# Patient Record
Sex: MALE | Race: WHITE | NOT HISPANIC OR LATINO | Employment: OTHER | URBAN - METROPOLITAN AREA
[De-identification: names, ages, dates, MRNs, and addresses within clinical notes are randomized per-mention and may not be internally consistent; named-entity substitution may affect disease eponyms.]

---

## 2017-02-10 ENCOUNTER — HOSPITAL ENCOUNTER (OUTPATIENT)
Dept: RADIOLOGY | Facility: MEDICAL CENTER | Age: 77
End: 2017-02-10
Attending: INTERNAL MEDICINE
Payer: MEDICARE

## 2017-02-10 DIAGNOSIS — K50.019 CROHN'S DISEASE OF SMALL INTESTINE WITH COMPLICATION (HCC): ICD-10-CM

## 2017-02-10 PROCEDURE — 700117 HCHG RX CONTRAST REV CODE 255: Performed by: INTERNAL MEDICINE

## 2017-02-10 PROCEDURE — A9577 INJ MULTIHANCE: HCPCS | Performed by: INTERNAL MEDICINE

## 2017-02-10 PROCEDURE — 74183 MRI ABD W/O CNTR FLWD CNTR: CPT

## 2017-02-10 PROCEDURE — 700111 HCHG RX REV CODE 636 W/ 250 OVERRIDE (IP): Performed by: RADIOLOGY

## 2017-02-10 PROCEDURE — 72197 MRI PELVIS W/O & W/DYE: CPT

## 2017-02-10 RX ADMIN — GLUCAGON HYDROCHLORIDE 1 MG: KIT at 14:15

## 2017-02-10 RX ADMIN — GADOBENATE DIMEGLUMINE 10 ML: 529 INJECTION, SOLUTION INTRAVENOUS at 15:34

## 2017-03-07 ENCOUNTER — APPOINTMENT (OUTPATIENT)
Dept: ONCOLOGY | Facility: MEDICAL CENTER | Age: 77
End: 2017-03-07
Attending: INTERNAL MEDICINE
Payer: MEDICARE

## 2017-03-15 ENCOUNTER — OUTPATIENT INFUSION SERVICES (OUTPATIENT)
Dept: ONCOLOGY | Facility: MEDICAL CENTER | Age: 77
End: 2017-03-15
Attending: INTERNAL MEDICINE
Payer: MEDICARE

## 2017-03-15 VITALS
DIASTOLIC BLOOD PRESSURE: 77 MMHG | HEART RATE: 63 BPM | HEIGHT: 71 IN | OXYGEN SATURATION: 97 % | SYSTOLIC BLOOD PRESSURE: 157 MMHG | TEMPERATURE: 98.3 F | BODY MASS INDEX: 23.92 KG/M2 | WEIGHT: 170.86 LBS | RESPIRATION RATE: 18 BRPM

## 2017-03-15 DIAGNOSIS — K50.119 CROHN'S DISEASE OF COLON, UNSPECIFIED COMPLICATION (HCC): ICD-10-CM

## 2017-03-15 PROCEDURE — 86480 TB TEST CELL IMMUN MEASURE: CPT

## 2017-03-15 PROCEDURE — 36415 COLL VENOUS BLD VENIPUNCTURE: CPT

## 2017-03-15 RX ORDER — CYANOCOBALAMIN 1000 UG/ML
1000 INJECTION, SOLUTION INTRAMUSCULAR; SUBCUTANEOUS
COMMUNITY
End: 2019-01-04

## 2017-03-15 RX ORDER — CHLORTHALIDONE 25 MG/1
25 TABLET ORAL DAILY
COMMUNITY
End: 2019-01-04

## 2017-03-15 ASSESSMENT — PAIN SCALES - GENERAL: PAINLEVEL: 7=MODERATE-SEVERE PAIN

## 2017-03-15 NOTE — PROGRESS NOTES
Pt scheduled for [facility].  Upon review of requirements, pt needs CBC and TB test, was able to receive lab results from MD office/hospital but TB test is 5 years old,  Notified pt that we can draw lab today, but cannot infuse medication.  Pt agreeable to lab only today.  Lab drawn from [facility] with 23 G butterfly and sent to lab.  Pt states he will call to reschedule his apt.  Pt left IC, no s/s of distress.

## 2017-03-16 LAB
M TB TUBERC IFN-G BLD QL: NEGATIVE
M TB TUBERC IFN-G/MITOGEN IGNF BLD: -0
M TB TUBERC IGNF/MITOGEN IGNF CONTROL: 9.36 [IU]/ML
MITOGEN IGNF BCKGRD COR BLD-ACNC: 0.02 [IU]/ML

## 2017-03-28 ENCOUNTER — OUTPATIENT INFUSION SERVICES (OUTPATIENT)
Dept: ONCOLOGY | Facility: MEDICAL CENTER | Age: 77
End: 2017-03-28
Attending: INTERNAL MEDICINE
Payer: MEDICARE

## 2017-03-28 VITALS
BODY MASS INDEX: 24.17 KG/M2 | DIASTOLIC BLOOD PRESSURE: 71 MMHG | HEART RATE: 57 BPM | HEIGHT: 71 IN | RESPIRATION RATE: 18 BRPM | TEMPERATURE: 97.7 F | OXYGEN SATURATION: 100 % | WEIGHT: 172.62 LBS | SYSTOLIC BLOOD PRESSURE: 156 MMHG

## 2017-03-28 PROCEDURE — 96413 CHEMO IV INFUSION 1 HR: CPT

## 2017-03-28 PROCEDURE — 700111 HCHG RX REV CODE 636 W/ 250 OVERRIDE (IP): Performed by: INTERNAL MEDICINE

## 2017-03-28 PROCEDURE — C9399 UNCLASSIFIED DRUGS OR BIOLOG: HCPCS | Performed by: INTERNAL MEDICINE

## 2017-03-28 PROCEDURE — 700105 HCHG RX REV CODE 258: Performed by: INTERNAL MEDICINE

## 2017-03-28 PROCEDURE — 306780 HCHG STAT FOR TRANSFUSION PER CASE

## 2017-03-28 RX ADMIN — USTEKINUMAB: 130 SOLUTION INTRAVENOUS at 12:05

## 2017-03-28 ASSESSMENT — PAIN SCALES - GENERAL: PAINLEVEL: NO PAIN

## 2017-03-28 NOTE — PROGRESS NOTES
PT is here for his Stelara infusion to treat Chron's disease. Pt states he failed on Humira an this is the medication his MD is recommending. Labs/TB drawn previously and reviewed. Pt denies any s/s infection. Infusion completed over one hour as ordered (filter in place) and pt monitored about 30min after infusion. Treatment well tolerated. Pt has plans to continue with injections at home. Discharged home to self care.

## 2018-05-24 ENCOUNTER — HOSPITAL ENCOUNTER (OUTPATIENT)
Dept: LAB | Facility: MEDICAL CENTER | Age: 78
End: 2018-05-24
Attending: INTERNAL MEDICINE
Payer: MEDICARE

## 2018-05-24 ENCOUNTER — HOSPITAL ENCOUNTER (OUTPATIENT)
Dept: RADIOLOGY | Facility: MEDICAL CENTER | Age: 78
End: 2018-05-24
Attending: INTERNAL MEDICINE
Payer: MEDICARE

## 2018-05-24 DIAGNOSIS — R93.5 ABNORMAL CT OF THE ABDOMEN: ICD-10-CM

## 2018-05-24 LAB
BUN SERPL-MCNC: 34 MG/DL (ref 8–22)
CREAT SERPL-MCNC: 1.82 MG/DL (ref 0.5–1.4)

## 2018-05-24 PROCEDURE — 82565 ASSAY OF CREATININE: CPT

## 2018-05-24 PROCEDURE — 74183 MRI ABD W/O CNTR FLWD CNTR: CPT

## 2018-05-24 PROCEDURE — 36415 COLL VENOUS BLD VENIPUNCTURE: CPT

## 2018-05-24 PROCEDURE — A9577 INJ MULTIHANCE: HCPCS | Performed by: INTERNAL MEDICINE

## 2018-05-24 PROCEDURE — 84520 ASSAY OF UREA NITROGEN: CPT

## 2018-05-24 PROCEDURE — 700117 HCHG RX CONTRAST REV CODE 255: Performed by: INTERNAL MEDICINE

## 2018-05-24 RX ADMIN — GADOBENATE DIMEGLUMINE 10 ML: 529 INJECTION, SOLUTION INTRAVENOUS at 18:17

## 2019-01-04 ENCOUNTER — APPOINTMENT (OUTPATIENT)
Dept: ADMISSIONS | Facility: MEDICAL CENTER | Age: 79
End: 2019-01-04
Attending: INTERNAL MEDICINE
Payer: MEDICARE

## 2019-01-04 RX ORDER — CARVEDILOL 6.25 MG/1
6.25 TABLET ORAL 2 TIMES DAILY WITH MEALS
COMMUNITY
End: 2023-01-03

## 2019-01-07 ENCOUNTER — APPOINTMENT (OUTPATIENT)
Dept: RADIOLOGY | Facility: MEDICAL CENTER | Age: 79
End: 2019-01-07
Attending: INTERNAL MEDICINE
Payer: MEDICARE

## 2019-01-07 ENCOUNTER — HOSPITAL ENCOUNTER (OUTPATIENT)
Facility: MEDICAL CENTER | Age: 79
End: 2019-01-07
Attending: INTERNAL MEDICINE | Admitting: INTERNAL MEDICINE
Payer: MEDICARE

## 2019-01-07 VITALS
OXYGEN SATURATION: 97 % | TEMPERATURE: 97.6 F | RESPIRATION RATE: 16 BRPM | BODY MASS INDEX: 24.35 KG/M2 | WEIGHT: 173.94 LBS | SYSTOLIC BLOOD PRESSURE: 168 MMHG | HEIGHT: 71 IN | DIASTOLIC BLOOD PRESSURE: 97 MMHG | HEART RATE: 58 BPM

## 2019-01-07 DIAGNOSIS — N18.30 CHRONIC KIDNEY DISEASE, STAGE III (MODERATE) (HCC): ICD-10-CM

## 2019-01-07 LAB
ERYTHROCYTE [DISTWIDTH] IN BLOOD BY AUTOMATED COUNT: 46 FL (ref 35.9–50)
HCT VFR BLD AUTO: 33.9 % (ref 42–52)
HGB BLD-MCNC: 11.1 G/DL (ref 14–18)
INR PPP: 1.1 (ref 0.87–1.13)
MCH RBC QN AUTO: 32.4 PG (ref 27–33)
MCHC RBC AUTO-ENTMCNC: 32.7 G/DL (ref 33.7–35.3)
MCV RBC AUTO: 98.8 FL (ref 81.4–97.8)
PATHOLOGY CONSULT NOTE: NORMAL
PLATELET # BLD AUTO: 225 K/UL (ref 164–446)
PMV BLD AUTO: 10.4 FL (ref 9–12.9)
PROTHROMBIN TIME: 14.3 SEC (ref 12–14.6)
RBC # BLD AUTO: 3.43 M/UL (ref 4.7–6.1)
WBC # BLD AUTO: 6.5 K/UL (ref 4.8–10.8)

## 2019-01-07 PROCEDURE — 700105 HCHG RX REV CODE 258: Performed by: RADIOLOGY

## 2019-01-07 PROCEDURE — 700111 HCHG RX REV CODE 636 W/ 250 OVERRIDE (IP)

## 2019-01-07 PROCEDURE — 88305 TISSUE EXAM BY PATHOLOGIST: CPT

## 2019-01-07 PROCEDURE — 160002 HCHG RECOVERY MINUTES (STAT)

## 2019-01-07 PROCEDURE — 88346 IMFLUOR 1ST 1ANTB STAIN PX: CPT

## 2019-01-07 PROCEDURE — 88348 ELECTRON MICROSCOPY DX: CPT

## 2019-01-07 PROCEDURE — 88313 SPECIAL STAINS GROUP 2: CPT | Mod: 91

## 2019-01-07 PROCEDURE — 88300 SURGICAL PATH GROSS: CPT

## 2019-01-07 PROCEDURE — 76942 ECHO GUIDE FOR BIOPSY: CPT

## 2019-01-07 PROCEDURE — 85027 COMPLETE CBC AUTOMATED: CPT

## 2019-01-07 PROCEDURE — 85610 PROTHROMBIN TIME: CPT

## 2019-01-07 PROCEDURE — 700111 HCHG RX REV CODE 636 W/ 250 OVERRIDE (IP): Performed by: RADIOLOGY

## 2019-01-07 RX ORDER — ONDANSETRON 2 MG/ML
4 INJECTION INTRAMUSCULAR; INTRAVENOUS PRN
Status: DISCONTINUED | OUTPATIENT
Start: 2019-01-07 | End: 2019-01-07 | Stop reason: HOSPADM

## 2019-01-07 RX ORDER — SODIUM CHLORIDE 9 MG/ML
1000 INJECTION, SOLUTION INTRAVENOUS
Status: DISCONTINUED | OUTPATIENT
Start: 2019-01-07 | End: 2019-01-07 | Stop reason: HOSPADM

## 2019-01-07 RX ORDER — OXYCODONE HYDROCHLORIDE 5 MG/1
2.5 TABLET ORAL
Status: DISCONTINUED | OUTPATIENT
Start: 2019-01-07 | End: 2019-01-07 | Stop reason: HOSPADM

## 2019-01-07 RX ORDER — HYDRALAZINE HYDROCHLORIDE 20 MG/ML
INJECTION INTRAMUSCULAR; INTRAVENOUS
Status: DISCONTINUED
Start: 2019-01-07 | End: 2019-01-07 | Stop reason: HOSPADM

## 2019-01-07 RX ORDER — NALOXONE HYDROCHLORIDE 0.4 MG/ML
INJECTION, SOLUTION INTRAMUSCULAR; INTRAVENOUS; SUBCUTANEOUS
Status: COMPLETED
Start: 2019-01-07 | End: 2019-01-07

## 2019-01-07 RX ORDER — SODIUM CHLORIDE 9 MG/ML
500 INJECTION, SOLUTION INTRAVENOUS
Status: DISCONTINUED | OUTPATIENT
Start: 2019-01-07 | End: 2019-01-07 | Stop reason: HOSPADM

## 2019-01-07 RX ORDER — HYDRALAZINE HYDROCHLORIDE 20 MG/ML
10 INJECTION INTRAMUSCULAR; INTRAVENOUS ONCE
Status: COMPLETED | OUTPATIENT
Start: 2019-01-07 | End: 2019-01-07

## 2019-01-07 RX ORDER — MIDAZOLAM HYDROCHLORIDE 1 MG/ML
INJECTION INTRAMUSCULAR; INTRAVENOUS
Status: COMPLETED
Start: 2019-01-07 | End: 2019-01-07

## 2019-01-07 RX ORDER — MIDAZOLAM HYDROCHLORIDE 1 MG/ML
.5-2 INJECTION INTRAMUSCULAR; INTRAVENOUS PRN
Status: DISCONTINUED | OUTPATIENT
Start: 2019-01-07 | End: 2019-01-07 | Stop reason: HOSPADM

## 2019-01-07 RX ORDER — ONDANSETRON 2 MG/ML
4 INJECTION INTRAMUSCULAR; INTRAVENOUS PRN
Status: DISCONTINUED | OUTPATIENT
Start: 2019-01-07 | End: 2019-01-07

## 2019-01-07 RX ORDER — HYDROMORPHONE HYDROCHLORIDE 2 MG/ML
0.25 INJECTION, SOLUTION INTRAMUSCULAR; INTRAVENOUS; SUBCUTANEOUS
Status: DISCONTINUED | OUTPATIENT
Start: 2019-01-07 | End: 2019-01-07 | Stop reason: HOSPADM

## 2019-01-07 RX ORDER — MIDAZOLAM HYDROCHLORIDE 1 MG/ML
.5-2 INJECTION INTRAMUSCULAR; INTRAVENOUS PRN
Status: DISCONTINUED | OUTPATIENT
Start: 2019-01-07 | End: 2019-01-07

## 2019-01-07 RX ORDER — SODIUM CHLORIDE 9 MG/ML
500 INJECTION, SOLUTION INTRAVENOUS
Status: DISCONTINUED | OUTPATIENT
Start: 2019-01-07 | End: 2019-01-07

## 2019-01-07 RX ADMIN — MIDAZOLAM 1 MG: 1 INJECTION INTRAMUSCULAR; INTRAVENOUS at 13:58

## 2019-01-07 RX ADMIN — MIDAZOLAM 1 MG: 1 INJECTION INTRAMUSCULAR; INTRAVENOUS at 13:50

## 2019-01-07 RX ADMIN — FENTANYL CITRATE 25 MCG: 50 INJECTION, SOLUTION INTRAMUSCULAR; INTRAVENOUS at 13:50

## 2019-01-07 RX ADMIN — SODIUM CHLORIDE 1000 ML: 9 INJECTION, SOLUTION INTRAVENOUS at 11:41

## 2019-01-07 RX ADMIN — HYDRALAZINE HYDROCHLORIDE 10 MG: 20 INJECTION INTRAMUSCULAR; INTRAVENOUS at 15:34

## 2019-01-07 RX ADMIN — FENTANYL CITRATE 25 MCG: 50 INJECTION, SOLUTION INTRAMUSCULAR; INTRAVENOUS at 13:58

## 2019-01-07 ASSESSMENT — PAIN SCALES - GENERAL
PAINLEVEL_OUTOF10: 0

## 2019-01-07 NOTE — DISCHARGE INSTRUCTIONS
ACTIVITY: Rest and take it easy for the first 24 hours.  A responsible adult is recommended to remain with you during that time.  It is normal to feel sleepy.  We encourage you to not do anything that requires balance, judgment or coordination.    MILD FLU-LIKE SYMPTOMS ARE NORMAL. YOU MAY EXPERIENCE GENERALIZED MUSCLE ACHES, THROAT IRRITATION, HEADACHE AND/OR SOME NAUSEA.    FOR 24 HOURS DO NOT:  Drive, operate machinery or run household appliances.  Drink beer or alcoholic beverages.   Make important decisions or sign legal documents.    SPECIAL INSTRUCTIONS: follow up with primary care physician as needed  Follow up with your kidney and heart doctor as needed  If you experience chest pain, shortness of breath call 911 return to ER   Resume your home medications.    DIET: To avoid nausea, slowly advance diet as tolerated, avoiding spicy or greasy foods for the first day.  Add more substantial food to your diet according to your physician's instructions.  Babies can be fed formula or breast milk as soon as they are hungry.  INCREASE FLUIDS AND FIBER TO AVOID CONSTIPATION.    SURGICAL DRESSING/BATHING: keep band aid clean dry intact for 24 hours, remove dressing after 24 hours.    FOLLOW-UP APPOINTMENT:  A follow-up appointment should be arranged with your doctor in [phone]; call to schedule.    You should CALL YOUR PHYSICIAN if you develop:  Fever greater than 101 degrees F.  Pain not relieved by medication, or persistent nausea or vomiting.  Excessive bleeding (blood soaking through dressing) or unexpected drainage from the wound.  Extreme redness or swelling around the incision site, drainage of pus or foul smelling drainage.  Inability to urinate or empty your bladder within 8 hours.  Problems with breathing or chest pain.    You should call 911 if you develop problems with breathing or chest pain.  If you are unable to contact your doctor or surgical center, you should go to the nearest emergency room or  urgent care center.  Physician's telephone #: [phone]    If any questions arise, call your doctor.  If your doctor is not available, please feel free to call the Surgical Center at [phone].  The Center is open Monday through Friday from 7AM to 7PM.  You can also call the HEALTH HOTLINE open 24 hours/day, 7 days/week and speak to a nurse at [phone], or toll free at [phone].    A registered nurse may call you a few days after your surgery to see how you are doing after your procedure.    MEDICATIONS: Resume taking daily medication.  Take prescribed pain medication with food.  If no medication is prescribed, you may take non-aspirin pain medication if needed.  PAIN MEDICATION CAN BE VERY CONSTIPATING.  Take a stool softener or laxative such as senokot, pericolace, or milk of magnesia if needed.  Kidney Biopsy, Care After  Refer to this sheet in the next few weeks. These instructions provide you with information on caring for yourself after your procedure. Your health care provider may also give you more specific instructions. Your treatment has been planned according to current medical practices, but problems sometimes occur. Call your health care provider if you have any problems or questions after your procedure.   WHAT TO EXPECT AFTER THE PROCEDURE   · You may notice blood in the urine for the first 24 hours after the biopsy.  · You may feel some pain at the biopsy site for 1-2 weeks after the biopsy.  HOME CARE INSTRUCTIONS  · Do not lift anything heavier than 10 lb (4.5 kg) for 2 weeks.  · Do not take any non-steroidal anti-inflammatory drugs (NSAIDs) or any blood thinners for a week after the biopsy unless instructed to do so by your health care provider.  · Only take medicines for pain, fever, or discomfort as directed by your health care provider.  SEEK MEDICAL CARE IF:  · You have bloody urine more than 24 hours after the biopsy.    · You develop a fever.    · You cannot urinate.    · You  have increasing pain at the biopsy site.    SEEK IMMEDIATE MEDICAL CARE IF:  You feel faint or dizzy.      This information is not intended to replace advice given to you by your health care provider. Make sure you discuss any questions you have with your health care provider.     Document Released: 08/20/2014 Document Reviewed: 08/20/2014  [employer] Interactive Patient Education ©2016 [employer]      If your physician has prescribed pain medication that includes Acetaminophen (Tylenol), do not take additional Acetaminophen (Tylenol) while taking the prescribed medication.    Depression / Suicide Risk    As you are discharged from this [facility] facility, it is important to learn how to keep safe from harming yourself.    Recognize the warning signs:  · Abrupt changes in personality, positive or negative- including increase in energy   · Giving away possessions  · Change in eating patterns- significant weight changes-  positive or negative  · Change in sleeping patterns- unable to sleep or sleeping all the time   · Unwillingness or inability to communicate  · Depression  · Unusual sadness, discouragement and loneliness  · Talk of wanting to die  · Neglect of personal appearance   · Rebelliousness- reckless behavior  · Withdrawal from people/activities they love  · Confusion- inability to concentrate     If you or a loved one observes any of these behaviors or has concerns about self-harm, here's what you can do:  · Talk about it- your feelings and reasons for harming yourself  · Remove any means that you might use to hurt yourself (examples: pills, rope, extension cords, firearm)  · Get professional help from the community (Mental Health, Substance Abuse, psychological counseling)  · Do not be alone:Call your Safe Contact- someone whom you trust who will be there for you.  · Call your local CRISIS HOTLINE 271-6438 or 445-940-9655  · Call your local Children's Mobile Crisis Response Team [facility] (825)  890-2796 or www.[employer]  · Call the toll free National Suicide Prevention Hotlines   · National Suicide Prevention Lifeline 531-359-KAJS (4395)  · National [employer] Discovery Line Network 800-SUICIDE (757-0154)

## 2019-01-07 NOTE — OR NURSING
1428 patient arrived from IR s/p left kidney biopsy, band aid left flank, patient wide awake, blood pressure high see flow sheets, will monitor and treat blood pressure. Patient denies pain.  1500 patient given water tolerating well.  1630 discharge instructions given to patient, patient verbalize understanding of the orders, iv discontinued tip intact, patient not in any distress or discomfort.  1635 patient escorted via w/c with all his personal belongings.

## 2019-01-07 NOTE — PROGRESS NOTES
CT Nursing Note:    Patient consented by Dr. Todd. Dr. Todd performed Non-targeted Kidney Biopsy left side.  Cores x 2 taken to pathology by Dr. Portillo.  The pt tolerated the procedure well.  Gauze and Tegaderm applied to left flank, CDI and soft; pressure held x 5 minutes.  Pt alert  and verbally appropriate post procedure, vital signs stable during procedure  and transport, see flow sheet for vital signs.  Report given to VIJAY Nino.  RN transported pt to PPU with Sao2 monitor.

## 2019-01-07 NOTE — OR SURGEON
Immediate Post- Operative Note        PostOp Diagnosis: Medical Renal Disease    Procedure(s): CT BX Left Kidney      Estimated Blood Loss: Less than 5 ml        Complications: None            1/7/2019     1:02 PM     Talib Todd

## 2019-01-07 NOTE — OR SURGEON
Immediate Post- Operative Note    PostOp Diagnosis: RENAL INSUFFICIENCY. STAGE III CKD      Procedure(s): US GUIDED LEFT RENAL BIOPSY    14G CORES X 2 TO ATTENDING PATHOLOGIST      Estimated Blood Loss: <5CC        Complications: NONE            1/7/2019     2:03 PM     Armand Leung

## 2019-02-12 RX ORDER — CHLORTHALIDONE 50 MG/1
50 TABLET ORAL DAILY
COMMUNITY

## 2019-02-13 ENCOUNTER — HOSPITAL ENCOUNTER (OUTPATIENT)
Facility: MEDICAL CENTER | Age: 79
End: 2019-02-13
Attending: INTERNAL MEDICINE | Admitting: INTERNAL MEDICINE
Payer: MEDICARE

## 2019-02-13 VITALS
BODY MASS INDEX: 24.07 KG/M2 | OXYGEN SATURATION: 96 % | HEART RATE: 53 BPM | TEMPERATURE: 97 F | HEIGHT: 71 IN | SYSTOLIC BLOOD PRESSURE: 166 MMHG | RESPIRATION RATE: 16 BRPM | DIASTOLIC BLOOD PRESSURE: 86 MMHG | WEIGHT: 171.96 LBS

## 2019-02-13 DIAGNOSIS — C90.00 MULTIPLE MYELOMA, REMISSION STATUS UNSPECIFIED (HCC): ICD-10-CM

## 2019-02-13 LAB
BASOPHILS # BLD AUTO: 0.4 % (ref 0–1.8)
BASOPHILS # BLD: 0.03 K/UL (ref 0–0.12)
EOSINOPHIL # BLD AUTO: 0.17 K/UL (ref 0–0.51)
EOSINOPHIL NFR BLD: 2.1 % (ref 0–6.9)
ERYTHROCYTE [DISTWIDTH] IN BLOOD BY AUTOMATED COUNT: 46.4 FL (ref 35.9–50)
HCT VFR BLD AUTO: 39.6 % (ref 42–52)
HGB BLD-MCNC: 12.8 G/DL (ref 14–18)
HGB RETIC QN AUTO: 35.1 PG/CELL (ref 29–35)
IMM GRANULOCYTES # BLD AUTO: 0.04 K/UL (ref 0–0.11)
IMM GRANULOCYTES NFR BLD AUTO: 0.5 % (ref 0–0.9)
IMM RETICS NFR: 12.9 % (ref 9.3–17.4)
LYMPHOCYTES # BLD AUTO: 1.26 K/UL (ref 1–4.8)
LYMPHOCYTES NFR BLD: 15.4 % (ref 22–41)
MCH RBC QN AUTO: 32.2 PG (ref 27–33)
MCHC RBC AUTO-ENTMCNC: 32.3 G/DL (ref 33.7–35.3)
MCV RBC AUTO: 99.7 FL (ref 81.4–97.8)
MONOCYTES # BLD AUTO: 0.51 K/UL (ref 0–0.85)
MONOCYTES NFR BLD AUTO: 6.3 % (ref 0–13.4)
NEUTROPHILS # BLD AUTO: 6.15 K/UL (ref 1.82–7.42)
NEUTROPHILS NFR BLD: 75.3 % (ref 44–72)
NRBC # BLD AUTO: 0 K/UL
NRBC BLD-RTO: 0 /100 WBC
PATHOLOGY CONSULT NOTE: NORMAL
PLATELET # BLD AUTO: 273 K/UL (ref 164–446)
PMV BLD AUTO: 10.3 FL (ref 9–12.9)
RBC # BLD AUTO: 3.97 M/UL (ref 4.7–6.1)
RETICS # AUTO: 0.05 M/UL (ref 0.04–0.06)
RETICS/RBC NFR: 1.2 % (ref 0.8–2.1)
WBC # BLD AUTO: 8.2 K/UL (ref 4.8–10.8)

## 2019-02-13 PROCEDURE — 160002 HCHG RECOVERY MINUTES (STAT): Performed by: HOSPITALIST

## 2019-02-13 PROCEDURE — 88342 IMHCHEM/IMCYTCHM 1ST ANTB: CPT

## 2019-02-13 PROCEDURE — 99152 MOD SED SAME PHYS/QHP 5/>YRS: CPT | Performed by: HOSPITALIST

## 2019-02-13 PROCEDURE — 88237 TISSUE CULTURE BONE MARROW: CPT

## 2019-02-13 PROCEDURE — 36415 COLL VENOUS BLD VENIPUNCTURE: CPT

## 2019-02-13 PROCEDURE — 88185 FLOWCYTOMETRY/TC ADD-ON: CPT | Mod: 91

## 2019-02-13 PROCEDURE — 88305 TISSUE EXAM BY PATHOLOGIST: CPT | Mod: 59

## 2019-02-13 PROCEDURE — 85046 RETICYTE/HGB CONCENTRATE: CPT

## 2019-02-13 PROCEDURE — 88311 DECALCIFY TISSUE: CPT

## 2019-02-13 PROCEDURE — 700111 HCHG RX REV CODE 636 W/ 250 OVERRIDE (IP)

## 2019-02-13 PROCEDURE — 88313 SPECIAL STAINS GROUP 2: CPT

## 2019-02-13 PROCEDURE — 88341 IMHCHEM/IMCYTCHM EA ADD ANTB: CPT | Mod: 91

## 2019-02-13 PROCEDURE — 160027 HCHG SURGERY MINUTES - 1ST 30 MINS LEVEL 2: Performed by: HOSPITALIST

## 2019-02-13 PROCEDURE — 99153 MOD SED SAME PHYS/QHP EA: CPT | Performed by: HOSPITALIST

## 2019-02-13 PROCEDURE — 38222 DX BONE MARROW BX & ASPIR: CPT | Performed by: HOSPITALIST

## 2019-02-13 PROCEDURE — 88264 CHROMOSOME ANALYSIS 20-25: CPT

## 2019-02-13 PROCEDURE — 88184 FLOWCYTOMETRY/ TC 1 MARKER: CPT

## 2019-02-13 PROCEDURE — 88360 TUMOR IMMUNOHISTOCHEM/MANUAL: CPT | Mod: 91

## 2019-02-13 PROCEDURE — 85025 COMPLETE CBC W/AUTO DIFF WBC: CPT

## 2019-02-13 PROCEDURE — 700105 HCHG RX REV CODE 258: Performed by: HOSPITALIST

## 2019-02-13 PROCEDURE — 160048 HCHG OR STATISTICAL LEVEL 1-5: Performed by: HOSPITALIST

## 2019-02-13 RX ORDER — SODIUM CHLORIDE 9 MG/ML
500 INJECTION, SOLUTION INTRAVENOUS
Status: DISCONTINUED | OUTPATIENT
Start: 2019-02-13 | End: 2019-02-13 | Stop reason: HOSPADM

## 2019-02-13 RX ORDER — MIDAZOLAM HYDROCHLORIDE 1 MG/ML
.5-2 INJECTION INTRAMUSCULAR; INTRAVENOUS PRN
Status: DISCONTINUED | OUTPATIENT
Start: 2019-02-13 | End: 2019-02-13 | Stop reason: HOSPADM

## 2019-02-13 RX ORDER — MIDAZOLAM HYDROCHLORIDE 1 MG/ML
INJECTION INTRAMUSCULAR; INTRAVENOUS
Status: DISCONTINUED | OUTPATIENT
Start: 2019-02-13 | End: 2019-02-13 | Stop reason: HOSPADM

## 2019-02-13 NOTE — PROCEDURES
Bone Marrow Biopsy/Aspiration  Date/Time: 2/13/2019 11:28 AM  Performed by: CHIDI LYNCH  Authorized by: CHIDI LYNCH     Consent:     Consent obtained:  Written    Consent given by:  Patient    Risks discussed:  Bleeding, pain and repeat procedure    Alternatives discussed:  No treatment  Pre-procedure details:     Procedure type:  Aspiration and biopsy    Requesting physician:  Dr. Chavez     Indications:  Multiple Myeloma    Position:  Prone    Buttock laterality:  Left    Local anesthetic:  1% Lidocaine    Subcutaneous volume:  1 mL    Periosteum anesthetic volume:  3 mL    Preparation: Patient was prepped and draped in usual sterile fashion    Sedation:     Patient Sedated: Yes    Procedure details:     Aspirate obtained:  5 mL followed by 5 mL    Biopsy performed:  2 cores    Number of attempts:  3    Estimated blood loss (mL):  1  Post-procedure:     Puncture site:  Adhesive bandage applied    Patient tolerance of procedure:  Tolerated well, no immediate complications  Comments:      Sedation was administered at 11:20  The procedure finished at 11: 27  He was monitored until 11:35 due to conscious sedation directly by me  3 mg IV versed and 100 mcg IV Fentanyl were given

## 2019-02-13 NOTE — OR NURSING
1150 Pt received from endoscopy, received report from endo RN, assumed care of pt at this time. Pt awake and alert, tolerating fluids.     1155 Pt has bandaid to left hip, CDI. Pt meets discharge criteria, dressing self. Wife at bedside.     1200 IV dc'd without difficulty, pt and Wife given instructions for discharge, evidence of understanding demonstrated.     1205 Pt ambulated out, gait steady.

## 2019-02-28 NOTE — ADDENDUM NOTE
Encounter addended by: Krista Brewer R.N. on: 2/28/2019  2:58 PM<BR>    Actions taken: Visit Navigator Flowsheet section accepted

## 2019-09-04 ENCOUNTER — HOSPITAL ENCOUNTER (OUTPATIENT)
Dept: RADIOLOGY | Facility: MEDICAL CENTER | Age: 79
End: 2019-09-04
Attending: INTERNAL MEDICINE
Payer: MEDICARE

## 2019-09-04 DIAGNOSIS — R80.3 BENCE JONES PROTEIN: ICD-10-CM

## 2019-09-04 DIAGNOSIS — C88.0 MACROGLOBULINEMIA (HCC): ICD-10-CM

## 2019-09-04 PROCEDURE — 700117 HCHG RX CONTRAST REV CODE 255: Performed by: INTERNAL MEDICINE

## 2019-09-04 PROCEDURE — 71250 CT THORAX DX C-: CPT

## 2019-09-04 RX ADMIN — IOHEXOL 50 ML: 240 INJECTION, SOLUTION INTRATHECAL; INTRAVASCULAR; INTRAVENOUS; ORAL at 12:30

## 2022-12-16 ENCOUNTER — OFFICE VISIT (OUTPATIENT)
Dept: URGENT CARE | Facility: CLINIC | Age: 82
End: 2022-12-16
Payer: MEDICARE

## 2022-12-16 VITALS
BODY MASS INDEX: 21.7 KG/M2 | TEMPERATURE: 97.2 F | OXYGEN SATURATION: 98 % | DIASTOLIC BLOOD PRESSURE: 58 MMHG | HEART RATE: 56 BPM | SYSTOLIC BLOOD PRESSURE: 128 MMHG | RESPIRATION RATE: 20 BRPM | WEIGHT: 155 LBS | HEIGHT: 71 IN

## 2022-12-16 DIAGNOSIS — J22 LRTI (LOWER RESPIRATORY TRACT INFECTION): ICD-10-CM

## 2022-12-16 PROCEDURE — 99203 OFFICE O/P NEW LOW 30 MIN: CPT | Performed by: PHYSICIAN ASSISTANT

## 2022-12-16 RX ORDER — BENZONATATE 200 MG/1
200 CAPSULE ORAL 3 TIMES DAILY PRN
Qty: 60 CAPSULE | Refills: 0 | Status: SHIPPED
Start: 2022-12-16 | End: 2023-01-26

## 2022-12-16 RX ORDER — DOXYCYCLINE HYCLATE 100 MG
100 TABLET ORAL 2 TIMES DAILY
Qty: 14 TABLET | Refills: 0 | Status: SHIPPED | OUTPATIENT
Start: 2022-12-16 | End: 2022-12-23

## 2022-12-16 ASSESSMENT — ENCOUNTER SYMPTOMS
PALPITATIONS: 0
ABDOMINAL PAIN: 0
FEVER: 0
WHEEZING: 0
DIARRHEA: 0
VOMITING: 0
HEADACHES: 0
SPUTUM PRODUCTION: 1
SHORTNESS OF BREATH: 0
SINUS PAIN: 0
NAUSEA: 0
MYALGIAS: 0
CHILLS: 0
SORE THROAT: 0
DIZZINESS: 0
DIAPHORESIS: 0
COUGH: 1

## 2022-12-16 NOTE — PROGRESS NOTES
Subjective:     CHIEF COMPLAINT  Chief Complaint   Patient presents with    Cough     Lingering X3 weeks, sinus congestion, watery eyes, fatigue.        HPI  Joshua Cross is a very pleasant 82 y.o. male who presents to the clinic with a lingering cough and sinus congestion x3 weeks.  Him and his wife both came down with flulike symptoms at the onset.  Describes 1 to 2 days of fever, chills and body aches.  Cough has been his main complaint.  Cough is predominantly dry and nonproductive.  Sinus congestion has been present with discolored rhinorrhea.  He has tried various over-the-counter medications and is currently taking Mucinex without any significant improvement.  No history of asthma or COPD.    REVIEW OF SYSTEMS  Review of Systems   Constitutional:  Negative for chills, diaphoresis, fever and malaise/fatigue.   HENT:  Positive for congestion. Negative for ear pain, sinus pain and sore throat.    Respiratory:  Positive for cough and sputum production. Negative for shortness of breath and wheezing.    Cardiovascular:  Negative for chest pain and palpitations.   Gastrointestinal:  Negative for abdominal pain, diarrhea, nausea and vomiting.   Musculoskeletal:  Negative for myalgias.   Neurological:  Negative for dizziness and headaches.     PAST MEDICAL HISTORY  Patient Active Problem List    Diagnosis Date Noted    Incisional hernia with obstruction 05/08/2014       SURGICAL HISTORY   has a past surgical history that includes DX-DRAIN-LIVER ABSCESS-CYST (2011); resect small intest,each addnl (2012); tonsillectomy (1949); ventral hernia repair laparoscopic (5/8/2014); bone marrow aspiration (Left, 2/13/2019); and bone marrow biopsy, ndl/trocar (Left, 2/13/2019).    ALLERGIES  No Known Allergies    CURRENT MEDICATIONS  Home Medications       Reviewed by Iain David P.A.-C. (Physician Assistant) on 12/16/22 at 1028  Med List Status: <None>     Medication Last Dose Status   aspirin EC (ECOTRIN) 81 MG TBEC  "Taking Active   carvedilol (COREG) 6.25 MG Tab Unknown Active   chlorthalidone (HYGROTON) 25 MG Tab Taking Active   Cyanocobalamin (VITAMIN B-12) 2500 MCG SL Tab Taking Active   losartan (COZAAR) 100 MG TABS Not Taking Active   Multiple Vitamin (MULTIVITAMINS PO) Taking Active   pantoprazole (PROTONIX) 40 MG TBEC Taking Active   simvastatin (ZOCOR) 40 MG TABS Taking Active   Ustekinumab 90 MG/ML Solution Prefilled Syringe Taking Active   vitamin D (CHOLECALCIFEROL) 1000 UNIT Tab Taking Active                    SOCIAL HISTORY  Social History     Tobacco Use    Smoking status: Former     Packs/day: 1.00     Years: 10.00     Pack years: 10.00     Types: Cigarettes     Quit date: 1972     Years since quittin.6    Smokeless tobacco: Never   Substance and Sexual Activity    Alcohol use: Yes     Comment: 2 drinks per day, wine    Drug use: No    Sexual activity: Not on file       FAMILY HISTORY  History reviewed. No pertinent family history.       Objective:     VITAL SIGNS: /58   Pulse (!) 56   Temp 36.2 °C (97.2 °F) (Temporal)   Resp 20   Ht 1.803 m (5' 11\")   Wt 70.3 kg (155 lb)   SpO2 98%   BMI 21.62 kg/m²     PHYSICAL EXAM  Physical Exam  Constitutional:       General: He is not in acute distress.     Appearance: Normal appearance. He is not ill-appearing, toxic-appearing or diaphoretic.   HENT:      Head: Normocephalic and atraumatic.      Right Ear: Tympanic membrane, ear canal and external ear normal.      Left Ear: Tympanic membrane, ear canal and external ear normal.      Nose: Congestion present. No rhinorrhea.      Mouth/Throat:      Mouth: Mucous membranes are moist.      Pharynx: No oropharyngeal exudate or posterior oropharyngeal erythema.   Eyes:      Conjunctiva/sclera: Conjunctivae normal.   Cardiovascular:      Rate and Rhythm: Normal rate and regular rhythm.      Pulses: Normal pulses.      Heart sounds: Normal heart sounds.   Pulmonary:      Effort: Pulmonary effort is normal. "      Breath sounds: Normal breath sounds. No stridor. No wheezing, rhonchi or rales.   Musculoskeletal:      Cervical back: Normal range of motion. No muscular tenderness.   Lymphadenopathy:      Cervical: No cervical adenopathy.   Skin:     General: Skin is warm and dry.      Capillary Refill: Capillary refill takes less than 2 seconds.   Neurological:      Mental Status: He is alert.   Psychiatric:         Mood and Affect: Mood normal.         Thought Content: Thought content normal.       Assessment/Plan:     1. LRTI (lower respiratory tract infection)    - doxycycline (VIBRAMYCIN) 100 MG Tab; Take 1 Tablet by mouth 2 times a day for 7 days.  Dispense: 14 Tablet; Refill: 0  - benzonatate (TESSALON) 200 MG capsule; Take 1 Capsule by mouth 3 times a day as needed for Cough.  Dispense: 60 Capsule; Refill: 0      MDM/Comments:    Patient has been dealing with a persistent cough ongoing x3 weeks.  Symptoms have been lingering since he came down with a flulike illness.  On exam lung sounds are clear to auscultation.  No wheezes rhonchi or rales.  SPO2 98% on room air.  Denies any shortness of breath or chest pain.  Due to the nature and length of illness we will begin treatment for secondary bacterial infection with a course of doxycycline.  Return precautions discussed for any worsening/red flag symptoms.    Differential diagnosis, natural history, supportive care, and indications for immediate follow-up discussed. All questions answered. Patient agrees with the plan of care.    Follow-up as needed if symptoms worsen or fail to improve to PCP, Urgent care or Emergency Room.    I have personally reviewed prior external notes and test results pertinent to today's visit.  I have independently reviewed and interpreted all diagnostics ordered during this urgent care acute visit.   Discussed management options (risks,benefits, and alternatives to treatment). Pt expresses understanding and the treatment plan was agreed upon.  Questions were encouraged and answered to pt's satisfaction.    Please note that this dictation was created using voice recognition software. I have made a reasonable attempt to correct obvious errors, but I expect that there are errors of grammar and possibly content that I did not discover before finalizing the note.

## 2023-01-03 ENCOUNTER — HOSPITAL ENCOUNTER (EMERGENCY)
Facility: MEDICAL CENTER | Age: 83
End: 2023-01-03
Attending: EMERGENCY MEDICINE
Payer: MEDICARE

## 2023-01-03 ENCOUNTER — APPOINTMENT (OUTPATIENT)
Dept: RADIOLOGY | Facility: MEDICAL CENTER | Age: 83
End: 2023-01-03
Attending: EMERGENCY MEDICINE
Payer: MEDICARE

## 2023-01-03 VITALS
TEMPERATURE: 98.6 F | SYSTOLIC BLOOD PRESSURE: 170 MMHG | OXYGEN SATURATION: 96 % | RESPIRATION RATE: 18 BRPM | WEIGHT: 154.98 LBS | HEART RATE: 67 BPM | BODY MASS INDEX: 21.7 KG/M2 | HEIGHT: 71 IN | DIASTOLIC BLOOD PRESSURE: 79 MMHG
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
## 2023-01-03 DIAGNOSIS — M25.512 ACUTE PAIN OF LEFT SHOULDER: ICD-10-CM

## 2023-01-03 PROCEDURE — 99284 EMERGENCY DEPT VISIT MOD MDM: CPT

## 2023-01-03 PROCEDURE — 73030 X-RAY EXAM OF SHOULDER: CPT | Mod: LT

## 2023-01-03 PROCEDURE — 73200 CT UPPER EXTREMITY W/O DYE: CPT | Mod: LT

## 2023-01-03 RX ORDER — RABEPRAZOLE SODIUM 20 MG/1
20 TABLET, DELAYED RELEASE ORAL DAILY
Status: SHIPPED | COMMUNITY
End: 2023-11-13

## 2023-01-03 RX ORDER — SENNOSIDES 8.6 MG
1300 CAPSULE ORAL EVERY 6 HOURS PRN
Status: SHIPPED | COMMUNITY
End: 2023-01-31

## 2023-01-03 RX ORDER — DOXYCYCLINE HYCLATE 100 MG
100 TABLET ORAL 2 TIMES DAILY
Status: SHIPPED | COMMUNITY
End: 2023-01-26

## 2023-01-03 RX ORDER — HYDROCODONE BITARTRATE AND ACETAMINOPHEN 10; 325 MG/1; MG/1
1 TABLET ORAL EVERY 6 HOURS PRN
Status: SHIPPED | COMMUNITY
End: 2023-01-03

## 2023-01-03 RX ORDER — HYDROCODONE BITARTRATE AND ACETAMINOPHEN 5; 325 MG/1; MG/1
1 TABLET ORAL EVERY 6 HOURS PRN
Qty: 12 TABLET | Refills: 0 | Status: SHIPPED | OUTPATIENT
Start: 2023-01-03 | End: 2023-01-06

## 2023-01-03 NOTE — ED NOTES
Med rec updated and complete, per pt   Allergies reviewed, per pt  Interviewed pt with wife at bedside with permission from pt.  Pt reports that he was taking LOSARTAN 100MG, per doctor told hi, to stop taking for a month.  Two weeks ago pt was told to take 25MG of LOSARTAN once a day.

## 2023-01-03 NOTE — ED TRIAGE NOTES
"Chief Complaint   Patient presents with    Shoulder Pain     L side, decreased ROM; pt was taking out the trash this AM and slipped and feel on driveway, denies any LOC or head injury, does not take blood thinners     BP (!) 157/69   Pulse (!) 55   Temp 36.2 °C (97.2 °F) (Temporal)   Resp 16   Ht 1.803 m (5' 11\")   Wt 70.3 kg (154 lb 15.7 oz)   SpO2 96%   BMI 21.62 kg/m²     Pt arrived w/ above concern, took Vicodin pill around 0630 this AM for the pain   "

## 2023-01-03 NOTE — ED NOTES
PT placed in sling. Pt educated on how to removed and place arm in sling. Pt verbalizes understand

## 2023-01-03 NOTE — ED PROVIDER NOTES
ED Provider Note        CHIEF COMPLAINT  Chief Complaint   Patient presents with    Shoulder Pain     L side, decreased ROM; pt was taking out the trash this AM and slipped and feel on driveway, denies any LOC or head injury, does not take blood thinners       EXTERNAL RECORDS REVIEWED  Select: Other none , PDMP    HPI  LIMITATION TO HISTORY   Select: : None  OUTSIDE HISTORIAN(S):  Select: Family at bedside    Joshua Cross is a 82 y.o. male who presents \to the emergency department complaining of left shoulder pain after ground-level fall.  The patient slipped in taking out the garbage and landed on his left shoulder.  He has had severe left shoulder pain since that time.  Pain is worsened by movement and palpation.  The patient takes aspirin but no other anticoagulation.  Did not hit his head or get knocked out.  No neck pain.  Denies any chest or abdominal pain.  Denies any other numbness tingling or weakness in his arms or legs.  Denies any injuries to chest or abdomen or hips.  He took a Norco prior to arrival.  Pain is severe and worsened with movement.  No other musculoskeletal injuries or complaints.    REVIEW OF SYSTEMS  See HPI for further details. All other systems are negative.     PAST MEDICAL HISTORY   has a past medical history of Alcohol use (2014), Anemia, Back pain (2014), Bowel habit changes, Cancer (HCC), Cholesterol blood decreased, Crohn's disease (HCC), Dental disorder, Heart burn, Hepatitis A (1980), High cholesterol, Hypertension, Liver abscess (2011), Pneumonia (1995), Skin cancer, and Umbilical hernia.    SURGICAL HISTORY   has a past surgical history that includes DX-DRAIN-LIVER ABSCESS-CYST (2011); resect small intest,each addnl (2012); tonsillectomy (1949); ventral hernia repair laparoscopic (5/8/2014); bone marrow aspiration (Left, 2/13/2019); and bone marrow biopsy, ndl/trocar (Left, 2/13/2019).    FAMILY HISTORY  History reviewed. No pertinent family history.    SOCIAL  "HISTORY  Social History     Tobacco Use    Smoking status: Former     Packs/day: 1.00     Years: 10.00     Pack years: 10.00     Types: Cigarettes     Quit date: 1972     Years since quittin.7    Smokeless tobacco: Never   Vaping Use    Vaping Use: Never used   Substance and Sexual Activity    Alcohol use: Yes     Comment: 2 drinks per day, wine    Drug use: No    Sexual activity: Not on file       CURRENT MEDICATIONS  Home Medications       Reviewed by Cate Hardy R.N. (Registered Nurse) on 23 at 0758  Med List Status: Not Addressed     Medication Last Dose Status   aspirin EC (ECOTRIN) 81 MG TBEC  Active   benzonatate (TESSALON) 200 MG capsule  Active   carvedilol (COREG) 6.25 MG Tab  Active   chlorthalidone (HYGROTON) 25 MG Tab  Active   Cyanocobalamin (VITAMIN B-12) 2500 MCG SL Tab  Active   losartan (COZAAR) 100 MG TABS  Active   Multiple Vitamin (MULTIVITAMINS PO)  Active   pantoprazole (PROTONIX) 40 MG TBEC  Active   simvastatin (ZOCOR) 40 MG TABS  Active   Ustekinumab 90 MG/ML Solution Prefilled Syringe  Active   vitamin D (CHOLECALCIFEROL) 1000 UNIT Tab  Active                    ALLERGIES  No Known Allergies    PHYSICAL EXAM  VITAL SIGNS: BP (!) 157/69   Pulse (!) 55   Temp 36.2 °C (97.2 °F) (Temporal)   Resp 16   Ht 1.803 m (5' 11\")   Wt 70.3 kg (154 lb 15.7 oz)   SpO2 96%   BMI 21.62 kg/m²    Constitutional: Well developed, Well nourished, No acute distress, Non-toxic appearance.  Appears uncomfortable  HENT: Normocephalic, Atraumatic, Bilateral external ears normal, Oropharynx moist,  Eyes: PERRL, EOMI, Conjunctiva normal, No discharge.   Neck: Normal range of motion, No tenderness, Supple, No stridor.  Clinically cleared  Cardiovascular: Normal heart rate, Normal rhythm, No murmurs, No rubs, No gallops.   Thorax & Lungs: Normal breath sounds, No respiratory distress, No wheezing, No chest tenderness.  No signs of trauma  Abdomen: Bowel sounds normal, Soft, No " tenderness  Skin: Warm, Dry, No erythema, No rash.   Back: No tenderness, No CVA tenderness.  Signs of trauma  Musculoskeletal: Good range of motion in all major joints.  Tenderness around the left shoulder pain with any range of motion.  No large hemarthrosis no significant clavicular tenderness.  No tenderness to the humerus except for a proximal on the glenohumeral joint itself.  Normal neurovascular exam normal neurovascular exam.  Neurologic: Alert, No focal deficits noted.   Psychiatric: Affect normal      DIAGNOSTIC STUDIES / PROCEDURES  EKG  None    LABS  None    RADIOLOGY  I have independently interpreted the diagnostic imaging associated with this visit and am waiting the final reading from the radiologist.   CT-SHOULDER W/O PLUS RECONS LEFT   Final Result      1.  No evidence of fracture or dislocation.      2.  Degenerative change of the acromioclavicular and glenohumeral joints.      DX-SHOULDER 2+ LEFT   Final Result      No evidence of acute fracture or dislocation.            COURSE & MEDICAL DECISION MAKING  Pertinent Labs & Imaging studies reviewed. (See chart for details)    ED Observation Status? No; Patient does not meet criteria for ED Observation.     INITIAL ASSESSMENT AND PLAN  Patient presents with acute shoulder pain after a fall.  I have considered intrathoracic and intra-abdominal injuries, head injuries, spinal injuries, pelvic injuries and other extremity injuries.    The patient did not hit his head or get knocked out.  He has no midline neck or back tenderness and has no rib tenderness.  He is very focal isolated shoulder tenderness and pain with range of motion of shoulder is quite intense in the glenohumeral joint.  No tenderness in the humerus distal to the shoulder itself.  Normal neurovascular exam no other extremity pain or injury.  Good peripheral pulses normal neurovascular exam.           FINAL PROBLEM LIST AND DISPOSITION    In addition to the chief complaint, the following  problems were addressed: Closed head injury, intra-abdominal, intrathoracic injury spinal fracture.    Patient's shoulder x-ray is nondiagnostic but is exquisite pain.  Differential diagnosis remains as an occult fracture, or dislocation posteriorly there is difficult to identify an x-ray CT is obtained.  CT is unremarkable.    The patient is carefully reassessed and COVID he does not have a humerus fracture distal to the shoulder is any bony tenderness we can see quite a bit of the humerus on his shoulder x-ray and he has no pain with loading laterally of the humerus itself.  His neurovascular exam is normal.  He has no chest tenderness near the shoulder on that side and additionally the CT clearly demonstrates the ribs in the lung on that side which do not show any signs of trauma.    At this point likely has an intra-articular injury.  The patient is placed in a sling for comfort.  Normal neurovascular exam.  He is requesting a prescription for pain medicine.        The patient will not drink alcohol nor drive with prescribed medications. The patient will return for worsening symptoms and is stable at the time of discharge. The patient verbalizes understanding and will comply.      In prescribing controlled substances to this patient, I certify that I have obtained and reviewed the medical history of Joshua Cross. I have also made a good jazmin effort to obtain applicable records from other providers who have treated the patient and no other records are available at this time.     I have conducted a physical exam and documented it. I have reviewed Mr. Cross’s prescription history as maintained by the [place] Prescription Monitoring Program.     I have assessed the patient’s risk for abuse, dependency, and addiction using the validated Opioid Risk Tool available at https://www.mdcalc.com/fejflo-mjbe-mjbv-ort-narcotic-abuse.     Given the above, I believe the benefits of controlled substance therapy outweigh  the risks. The reasons for prescribing controlled substances include non-narcotic, oral analgesic alternatives have been inadequate for pain control. Accordingly, I have discussed the risk and benefits, treatment plan, and alternative therapies with the patient.       Ana Mckenzie M.D.   [address]  [facility] [address]  [phone]    Schedule an appointment as soon as possible for a visit in 2 days      Froylan Tian M.D.  [address]  [facility] [address]  [facility] [id number] [phone]    Schedule an appointment as soon as possible for a visit in 1 day            FINAL IMPRESSION  1. Acute pain of left shoulder    2. Fall, initial encounter           Electronically signed by: Crow Rowan M.D., 1/3/2023 8:25 AM

## 2023-01-03 NOTE — DISCHARGE INSTRUCTIONS
Keep your arm in the sling.  Follow-up with the orthopedic doctor.  Return to the emergency department for more pain, any numbness, tingling, weakness or other concerns. no driving in the sling or on Norco.

## 2023-01-09 PROBLEM — M12.812 ROTATOR CUFF ARTHROPATHY OF LEFT SHOULDER: Status: ACTIVE | Noted: 2023-01-09

## 2023-01-09 PROBLEM — M25.512 ACUTE PAIN OF LEFT SHOULDER: Status: ACTIVE | Noted: 2023-01-09

## 2023-01-16 PROBLEM — S46.012A TRAUMATIC COMPLETE TEAR OF LEFT ROTATOR CUFF: Status: ACTIVE | Noted: 2023-01-16

## 2023-01-16 PROBLEM — S46.219A BICEPS TENDON TEAR: Status: ACTIVE | Noted: 2023-01-16

## 2023-01-26 PROBLEM — M75.42 SUBACROMIAL IMPINGEMENT OF LEFT SHOULDER: Status: ACTIVE | Noted: 2023-01-26

## 2023-01-26 PROBLEM — M75.22 BICEPS TENDONITIS, LEFT: Status: ACTIVE | Noted: 2023-01-26

## 2023-01-26 PROBLEM — M75.122 COMPLETE ROTATOR CUFF TEAR OF LEFT SHOULDER: Status: ACTIVE | Noted: 2023-01-26

## 2023-01-26 PROBLEM — S43.432A LABRAL TEAR OF SHOULDER, LEFT, INITIAL ENCOUNTER: Status: ACTIVE | Noted: 2023-01-26

## 2023-03-16 ENCOUNTER — HOSPITAL ENCOUNTER (OUTPATIENT)
Dept: RADIOLOGY | Facility: MEDICAL CENTER | Age: 83
End: 2023-03-16
Attending: FAMILY MEDICINE
Payer: MEDICARE

## 2023-03-16 DIAGNOSIS — R06.89 ABNORMAL BREATH SOUNDS: ICD-10-CM

## 2023-03-16 DIAGNOSIS — R06.02 SHORTNESS OF BREATH: ICD-10-CM

## 2023-03-16 PROCEDURE — 71046 X-RAY EXAM CHEST 2 VIEWS: CPT

## 2023-04-27 ENCOUNTER — HOSPITAL ENCOUNTER (OUTPATIENT)
Dept: RADIOLOGY | Facility: MEDICAL CENTER | Age: 83
End: 2023-04-27
Attending: FAMILY MEDICINE
Payer: MEDICARE

## 2023-04-27 DIAGNOSIS — J98.6 DISORDERS OF DIAPHRAGM: ICD-10-CM

## 2023-04-27 DIAGNOSIS — R06.89 ACUTE RESPIRATORY INSUFFICIENCY: ICD-10-CM

## 2023-04-27 DIAGNOSIS — C88.0 MACROGLOBULINEMIA (HCC): ICD-10-CM

## 2023-04-27 PROCEDURE — 71250 CT THORAX DX C-: CPT

## 2023-05-05 ENCOUNTER — TELEPHONE (OUTPATIENT)
Dept: CARDIOLOGY | Facility: MEDICAL CENTER | Age: 83
End: 2023-05-05
Payer: MEDICARE

## 2023-05-05 NOTE — TELEPHONE ENCOUNTER
Chart prep:    Faxed STAT records request to Dr. Ben CHRISTIANSON   Fx# [phone]  Ph# [phone]

## 2023-05-17 ENCOUNTER — OFFICE VISIT (OUTPATIENT)
Dept: CARDIOLOGY | Facility: MEDICAL CENTER | Age: 83
End: 2023-05-17
Attending: INTERNAL MEDICINE
Payer: MEDICARE

## 2023-05-17 VITALS
DIASTOLIC BLOOD PRESSURE: 74 MMHG | OXYGEN SATURATION: 99 % | HEIGHT: 71 IN | WEIGHT: 162 LBS | RESPIRATION RATE: 18 BRPM | SYSTOLIC BLOOD PRESSURE: 144 MMHG | HEART RATE: 76 BPM | BODY MASS INDEX: 22.68 KG/M2

## 2023-05-17 DIAGNOSIS — I10 ESSENTIAL HYPERTENSION: ICD-10-CM

## 2023-05-17 DIAGNOSIS — I25.84 CORONARY ARTERY CALCIFICATION: ICD-10-CM

## 2023-05-17 DIAGNOSIS — E78.2 MIXED HYPERLIPIDEMIA: ICD-10-CM

## 2023-05-17 DIAGNOSIS — R01.1 UNDIAGNOSED CARDIAC MURMURS: ICD-10-CM

## 2023-05-17 DIAGNOSIS — I25.10 CORONARY ARTERY CALCIFICATION: ICD-10-CM

## 2023-05-17 DIAGNOSIS — C88.0 WALDENSTROM'S MACROGLOBULINEMIA (HCC): ICD-10-CM

## 2023-05-17 PROCEDURE — 3077F SYST BP >= 140 MM HG: CPT | Performed by: INTERNAL MEDICINE

## 2023-05-17 PROCEDURE — 99204 OFFICE O/P NEW MOD 45 MIN: CPT | Performed by: INTERNAL MEDICINE

## 2023-05-17 PROCEDURE — 99213 OFFICE O/P EST LOW 20 MIN: CPT | Performed by: INTERNAL MEDICINE

## 2023-05-17 PROCEDURE — 3078F DIAST BP <80 MM HG: CPT | Performed by: INTERNAL MEDICINE

## 2023-05-17 RX ORDER — AMOXICILLIN 500 MG/1
TABLET, FILM COATED ORAL 3 TIMES DAILY
COMMUNITY
Start: 2023-05-16 | End: 2023-11-13

## 2023-05-17 RX ORDER — OMEPRAZOLE 10 MG/1
10 CAPSULE, DELAYED RELEASE ORAL DAILY
COMMUNITY
Start: 2023-04-07 | End: 2023-11-13

## 2023-05-17 NOTE — PROGRESS NOTES
"Chief Complaint   Patient presents with    Heart Murmur     New patient       Tc Cross is a 82 y.o. male who presents today for initial consultation regarding a family history of coronary artery disease.  Transferring from outside cardiologist.  Had a single episode of atrial fibrillation that was provoked and spontaneously resolved and has never had a recurrence.  This was noted by his previous cardiologist.  It was not felt anticoagulation was warranted due to the brief and precipitated nature of the event.  Otherwise he is taking medical therapy for dyslipidemia.  Does not smoke drink excessively or do drugs and has a family history of CAD.    Past Medical History:   Diagnosis Date    Alcohol use 2014    \"2 drinks per day\"    Anemia     Back pain 2014    Bowel habit changes     crohn's    Cancer (HCC)     basal and squamous cell skin    Cholesterol blood decreased     Crohn's disease (HCC)     Dental disorder     2 implants, # 3 and 12    Heart burn     Hepatitis A 1980    High cholesterol     Hypertension     Liver abscess 2011    Pneumonia 1995    Skin cancer     Umbilical hernia      Past Surgical History:   Procedure Laterality Date    PB SHLDR ARTHROSCOP,SURG,W/ROTAT CUFF REPB Left 3/8/2023    Procedure: LEFT SHOULDER ARTHROSCOPY ROTATOR CUFF REPAIR;  Surgeon: Gordon Zaldivar M.D.;  Location: [facility];  Service: Orthopedics    MO SHLDR ARTHROSCOP PART DEBRIDE 1-2 Left 3/8/2023    Procedure: LEFT LABRAL DEBRIDEMENT;  Surgeon: Gordon Zaldivar M.D.;  Location: [facility];  Service: Orthopedics    MO SHLDR ARTHROSCOP,PART ACROMIOPLAS Left 3/8/2023    Procedure: LEFT SUBACROMIAL DECOMPRESSION;  Surgeon: Gordon Zaldivar M.D.;  Location: [facility];  Service: Orthopedics    PB REPAIR BICEPS LONG TENDON Left 3/8/2023    Procedure: LEFT BICEPS TENODESIS, REPAIRS AS INDICATED;  Surgeon: Gordon Zaldivar M.D.;  [facility];  Service: Orthopedics    BONE MARROW ASPIRATION Left 2019    Procedure: BONE MARROW ASPIRATION;  Surgeon: Jay Hinojosa M.D.;  Location: ENDOSCOPY [facility];  Service: Orthopedics    BONE MARROW BIOPSY, NDL/TROCAR Left 2019    Procedure: BONE MARROW BIOPSY, NDL/TROCAR - GUERARD;  Surgeon: Jay Hinojosa M.D.;  Location: ENDOSCOPY [facility];  Service: Orthopedics    VENTRAL HERNIA REPAIR LAPAROSCOPIC  2014    Performed by Mir Hyatt M.D. at SURGERY [facility]    OK RESECT SMALL INTEST,EACH ADDNL      DX-DRAIN-LIVER ABSCESS-CYST      TONSILLECTOMY       No family history on file.  Social History     Socioeconomic History    Marital status:      Spouse name: Not on file    Number of children: Not on file    Years of education: Not on file    Highest education level: Not on file   Occupational History    Not on file   Tobacco Use    Smoking status: Former     Packs/day: 1.00     Years: 10.00     Pack years: 10.00     Types: Cigarettes     Quit date: 1972     Years since quittin.0    Smokeless tobacco: Never   Vaping Use    Vaping Use: Never used   Substance and Sexual Activity    Alcohol use: Yes     Comment: 2 drinks per day, wine    Drug use: No    Sexual activity: Not on file   Other Topics Concern    Not on file   Social History Narrative    Not on file     Social Determinants of Health     Financial Resource Strain: Not on file   Food Insecurity: Not on file   Transportation Needs: Not on file   Physical Activity: Not on file   Stress: Not on file   Social Connections: Not on file   Intimate Partner Violence: Not on file   Housing Stability: Not on file     No Known Allergies  Outpatient Encounter Medications as of 2023   Medication Sig Dispense Refill    Amoxicillin 500 MG Tab in the morning, at noon, and at bedtime.      omeprazole (PRILOSEC) 10 MG CAPSULE DELAYED RELEASE       aspirin 81 MG EC tablet aspirin 81 mg  "tablet,delayed release   1 tab daily      Multiple Vitamin (MULTI-VITAMIN DAILY PO) Daily Multi-Vitamin      Ustekinumab (STELARA) 90 MG/ML Solution Prefilled Syringe Stelara 90 mg/mL subcutaneous syringe      chlorthalidone (HYGROTON) 50 MG Tab Take 50 mg by mouth every day.      Cyanocobalamin (VITAMIN B-12 SL) Place 1 Tab under tongue every day.      VITAMIN D PO Take 1 Capsule by mouth every day.      losartan (COZAAR) 25 MG Tab Take 25 mg by mouth every day.      simvastatin (ZOCOR) 40 MG TABS Take 40 mg by mouth every morning.      rabeprazole (ACIPHEX) 20 MG tablet Take 20 mg by mouth every day. (Patient not taking: Reported on 5/17/2023)       No facility-administered encounter medications on file as of 5/17/2023.     Review of Systems   All other systems reviewed and are negative.             Objective     BP (!) 144/74 (BP Location: Left arm, Patient Position: Sitting)   Pulse 76   Resp 18   Ht 1.803 m (5' 11\")   Wt 73.5 kg (162 lb)   SpO2 99%   BMI 22.59 kg/m²     Physical Exam  Vitals and nursing note reviewed.   Constitutional:       General: He is not in acute distress.     Appearance: Normal appearance.   HENT:      Head: Normocephalic and atraumatic.      Right Ear: External ear normal.      Left Ear: External ear normal.      Nose: Nose normal.   Eyes:      Conjunctiva/sclera: Conjunctivae normal.   Cardiovascular:      Rate and Rhythm: Normal rate and regular rhythm.      Pulses: Normal pulses.      Heart sounds: No murmur heard.  Pulmonary:      Effort: Pulmonary effort is normal. No respiratory distress.      Breath sounds: Normal breath sounds.   Abdominal:      General: There is no distension.      Palpations: Abdomen is soft.   Musculoskeletal:      Cervical back: No rigidity or tenderness.      Right lower leg: No edema.      Left lower leg: No edema.   Skin:     General: Skin is warm and dry.      Capillary Refill: Capillary refill takes 2 to 3 seconds.   Neurological:      General: No " focal deficit present.      Mental Status: He is alert and oriented to person, place, and time.   Psychiatric:         Mood and Affect: Mood normal.         Behavior: Behavior normal.         Thought Content: Thought content normal.                Assessment & Plan     1. Coronary artery calcification  EC-ECHOCARDIOGRAM COMPLETE W/O CONT      2. Undiagnosed cardiac murmurs  EC-ECHOCARDIOGRAM COMPLETE W/O CONT      3. Essential hypertension  Comp Metabolic Panel    Lipid Profile      4. Mixed hyperlipidemia  Lipid Profile      5. Waldenstrom's macroglobulinemia (HCC)            Medical Decision Making: Today's Assessment/Status/Plan:          Doing well.  Recommend goal LDL less than 70.  CMP and lipid profile.  History of Wahlstrom's macroglobulinemia.  CT of the chest revealed coronary atherosclerosis based on calcifications on nondedicated study he is asymptomatic.  It also noted possible cardiomegaly.  Again, he is asymptomatic.  Echocardiogram to clarify.  Particularly in the context of multiple strokes and hypertension.  Follow-up routinely

## 2023-05-18 ENCOUNTER — HOSPITAL ENCOUNTER (OUTPATIENT)
Dept: CARDIOLOGY | Facility: MEDICAL CENTER | Age: 83
End: 2023-05-18
Attending: INTERNAL MEDICINE
Payer: MEDICARE

## 2023-05-18 DIAGNOSIS — R01.1 UNDIAGNOSED CARDIAC MURMURS: ICD-10-CM

## 2023-05-18 DIAGNOSIS — I25.84 CORONARY ARTERY CALCIFICATION: ICD-10-CM

## 2023-05-18 DIAGNOSIS — I25.10 CORONARY ARTERY CALCIFICATION: ICD-10-CM

## 2023-05-18 PROCEDURE — 93306 TTE W/DOPPLER COMPLETE: CPT

## 2023-05-19 LAB
LV EJECT FRACT  99904: 60
LV EJECT FRACT MOD 2C 99903: 58.91
LV EJECT FRACT MOD 4C 99902: 55.75
LV EJECT FRACT MOD BP 99901: 55.45

## 2023-05-19 PROCEDURE — 93306 TTE W/DOPPLER COMPLETE: CPT | Mod: 26 | Performed by: INTERNAL MEDICINE

## 2023-06-07 ENCOUNTER — HOSPITAL ENCOUNTER (OUTPATIENT)
Facility: MEDICAL CENTER | Age: 83
End: 2023-06-07
Attending: INTERNAL MEDICINE
Payer: MEDICARE

## 2023-06-07 DIAGNOSIS — E78.2 MIXED HYPERLIPIDEMIA: ICD-10-CM

## 2023-06-07 DIAGNOSIS — I10 ESSENTIAL HYPERTENSION: ICD-10-CM

## 2023-06-07 LAB
ALBUMIN SERPL BCP-MCNC: 4.2 G/DL (ref 3.2–4.9)
ALBUMIN/GLOB SERPL: 2.2 G/DL
ALP SERPL-CCNC: 80 U/L (ref 30–99)
ALT SERPL-CCNC: 16 U/L (ref 2–50)
ANION GAP SERPL CALC-SCNC: 13 MMOL/L (ref 7–16)
AST SERPL-CCNC: 19 U/L (ref 12–45)
BILIRUB SERPL-MCNC: 0.3 MG/DL (ref 0.1–1.5)
BUN SERPL-MCNC: 53 MG/DL (ref 8–22)
CALCIUM ALBUM COR SERPL-MCNC: 8.8 MG/DL (ref 8.5–10.5)
CALCIUM SERPL-MCNC: 9 MG/DL (ref 8.5–10.5)
CHLORIDE SERPL-SCNC: 109 MMOL/L (ref 96–112)
CHOLEST SERPL-MCNC: 116 MG/DL (ref 100–199)
CO2 SERPL-SCNC: 18 MMOL/L (ref 20–33)
CREAT SERPL-MCNC: 2.6 MG/DL (ref 0.5–1.4)
GFR SERPLBLD CREATININE-BSD FMLA CKD-EPI: 24 ML/MIN/1.73 M 2
GLOBULIN SER CALC-MCNC: 1.9 G/DL (ref 1.9–3.5)
GLUCOSE SERPL-MCNC: 109 MG/DL (ref 65–99)
HDLC SERPL-MCNC: 48 MG/DL
LDLC SERPL CALC-MCNC: 47 MG/DL
POTASSIUM SERPL-SCNC: 5.2 MMOL/L (ref 3.6–5.5)
PROT SERPL-MCNC: 6.1 G/DL (ref 6–8.2)
SODIUM SERPL-SCNC: 140 MMOL/L (ref 135–145)
TRIGL SERPL-MCNC: 107 MG/DL (ref 0–149)

## 2023-06-07 PROCEDURE — 80053 COMPREHEN METABOLIC PANEL: CPT

## 2023-06-07 PROCEDURE — 80061 LIPID PANEL: CPT

## 2023-10-11 ENCOUNTER — TELEPHONE (OUTPATIENT)
Dept: HEALTH INFORMATION MANAGEMENT | Facility: OTHER | Age: 83
End: 2023-10-11
Payer: MEDICARE

## 2023-11-13 ENCOUNTER — APPOINTMENT (OUTPATIENT)
Dept: RADIOLOGY | Facility: MEDICAL CENTER | Age: 83
End: 2023-11-13
Attending: EMERGENCY MEDICINE
Payer: MEDICARE

## 2023-11-13 ENCOUNTER — HOSPITAL ENCOUNTER (INPATIENT)
Facility: MEDICAL CENTER | Age: 83
LOS: 1 days | DRG: 065 | End: 2023-11-14
Attending: EMERGENCY MEDICINE | Admitting: INTERNAL MEDICINE
Payer: MEDICARE

## 2023-11-13 ENCOUNTER — HOSPITAL ENCOUNTER (EMERGENCY)
Facility: MEDICAL CENTER | Age: 83
End: 2023-11-13
Attending: EMERGENCY MEDICINE
Payer: MEDICARE

## 2023-11-13 ENCOUNTER — APPOINTMENT (OUTPATIENT)
Dept: CARDIOLOGY | Facility: MEDICAL CENTER | Age: 83
DRG: 065 | End: 2023-11-13
Attending: INTERNAL MEDICINE
Payer: MEDICARE

## 2023-11-13 VITALS
RESPIRATION RATE: 24 BRPM | HEART RATE: 61 BPM | SYSTOLIC BLOOD PRESSURE: 182 MMHG | DIASTOLIC BLOOD PRESSURE: 76 MMHG | WEIGHT: 158.62 LBS | TEMPERATURE: 98 F | OXYGEN SATURATION: 98 % | BODY MASS INDEX: 22.12 KG/M2

## 2023-11-13 DIAGNOSIS — I63.9 ACUTE ISCHEMIC STROKE (HCC): ICD-10-CM

## 2023-11-13 DIAGNOSIS — N18.9 CHRONIC RENAL IMPAIRMENT, UNSPECIFIED CKD STAGE: ICD-10-CM

## 2023-11-13 DIAGNOSIS — I63.9 ACUTE CVA (CEREBROVASCULAR ACCIDENT) (HCC): ICD-10-CM

## 2023-11-13 DIAGNOSIS — I63.9 CEREBROVASCULAR ACCIDENT (CVA), UNSPECIFIED MECHANISM (HCC): ICD-10-CM

## 2023-11-13 DIAGNOSIS — R53.1 LEFT-SIDED WEAKNESS: ICD-10-CM

## 2023-11-13 PROBLEM — D53.9 MACROCYTIC ANEMIA: Status: ACTIVE | Noted: 2023-11-13

## 2023-11-13 PROBLEM — E78.5 HYPERLIPIDEMIA: Status: ACTIVE | Noted: 2023-11-13

## 2023-11-13 PROBLEM — C88.00 WALDENSTROM MACROGLOBULINEMIA: Status: ACTIVE | Noted: 2023-11-13

## 2023-11-13 PROBLEM — I10 PRIMARY HYPERTENSION: Status: ACTIVE | Noted: 2023-11-13

## 2023-11-13 PROBLEM — C88.0 WALDENSTROM MACROGLOBULINEMIA (HCC): Status: ACTIVE | Noted: 2023-11-13

## 2023-11-13 LAB
ABO + RH BLD: NORMAL
ABO GROUP BLD: NORMAL
ALBUMIN SERPL BCP-MCNC: 4.3 G/DL (ref 3.2–4.9)
ALBUMIN/GLOB SERPL: 2.2 G/DL
ALP SERPL-CCNC: 75 U/L (ref 30–99)
ALT SERPL-CCNC: 14 U/L (ref 2–50)
ANION GAP SERPL CALC-SCNC: 13 MMOL/L (ref 7–16)
APTT PPP: 26.8 SEC (ref 24.7–36)
AST SERPL-CCNC: 15 U/L (ref 12–45)
BASOPHILS # BLD AUTO: 0.5 % (ref 0–1.8)
BASOPHILS # BLD: 0.03 K/UL (ref 0–0.12)
BILIRUB SERPL-MCNC: 0.2 MG/DL (ref 0.1–1.5)
BLD GP AB SCN SERPL QL: NORMAL
BUN SERPL-MCNC: 55 MG/DL (ref 8–22)
CALCIUM ALBUM COR SERPL-MCNC: 8.3 MG/DL (ref 8.5–10.5)
CALCIUM SERPL-MCNC: 8.5 MG/DL (ref 8.4–10.2)
CHLORIDE SERPL-SCNC: 111 MMOL/L (ref 96–112)
CO2 SERPL-SCNC: 18 MMOL/L (ref 20–33)
CREAT SERPL-MCNC: 2.48 MG/DL (ref 0.5–1.4)
EKG IMPRESSION: NORMAL
EOSINOPHIL # BLD AUTO: 0.11 K/UL (ref 0–0.51)
EOSINOPHIL NFR BLD: 1.8 % (ref 0–6.9)
ERYTHROCYTE [DISTWIDTH] IN BLOOD BY AUTOMATED COUNT: 49.2 FL (ref 35.9–50)
EST. AVERAGE GLUCOSE BLD GHB EST-MCNC: 117 MG/DL
GFR SERPLBLD CREATININE-BSD FMLA CKD-EPI: 25 ML/MIN/1.73 M 2
GLOBULIN SER CALC-MCNC: 2 G/DL (ref 1.9–3.5)
GLUCOSE BLD STRIP.AUTO-MCNC: 82 MG/DL (ref 65–99)
GLUCOSE SERPL-MCNC: 84 MG/DL (ref 65–99)
HBA1C MFR BLD: 5.7 % (ref 4–5.6)
HCT VFR BLD AUTO: 31.9 % (ref 42–52)
HGB BLD-MCNC: 10.3 G/DL (ref 14–18)
IMM GRANULOCYTES # BLD AUTO: 0.04 K/UL (ref 0–0.11)
IMM GRANULOCYTES NFR BLD AUTO: 0.7 % (ref 0–0.9)
INR PPP: 1.04 (ref 0.87–1.13)
LV EJECT FRACT  99904: 70
LV EJECT FRACT MOD 2C 99903: 66.47
LV EJECT FRACT MOD 4C 99902: 67.26
LV EJECT FRACT MOD BP 99901: 65.38
LYMPHOCYTES # BLD AUTO: 1.28 K/UL (ref 1–4.8)
LYMPHOCYTES NFR BLD: 21.5 % (ref 22–41)
MAGNESIUM SERPL-MCNC: 1 MG/DL (ref 1.5–2.5)
MAGNESIUM SERPL-MCNC: 1 MG/DL (ref 1.5–2.5)
MCH RBC QN AUTO: 32.4 PG (ref 27–33)
MCHC RBC AUTO-ENTMCNC: 32.3 G/DL (ref 32.3–36.5)
MCV RBC AUTO: 100.3 FL (ref 81.4–97.8)
MONOCYTES # BLD AUTO: 0.59 K/UL (ref 0–0.85)
MONOCYTES NFR BLD AUTO: 9.9 % (ref 0–13.4)
NEUTROPHILS # BLD AUTO: 3.9 K/UL (ref 1.82–7.42)
NEUTROPHILS NFR BLD: 65.6 % (ref 44–72)
NRBC # BLD AUTO: 0 K/UL
NRBC BLD-RTO: 0 /100 WBC (ref 0–0.2)
PLATELET # BLD AUTO: 249 K/UL (ref 164–446)
PMV BLD AUTO: 9.4 FL (ref 9–12.9)
POTASSIUM SERPL-SCNC: 5.5 MMOL/L (ref 3.6–5.5)
PROT SERPL-MCNC: 6.3 G/DL (ref 6–8.2)
PROTHROMBIN TIME: 14.2 SEC (ref 12–14.6)
RBC # BLD AUTO: 3.18 M/UL (ref 4.7–6.1)
RH BLD: NORMAL
SODIUM SERPL-SCNC: 142 MMOL/L (ref 135–145)
TROPONIN T SERPL-MCNC: 25 NG/L (ref 6–19)
WBC # BLD AUTO: 6 K/UL (ref 4.8–10.8)

## 2023-11-13 PROCEDURE — 700111 HCHG RX REV CODE 636 W/ 250 OVERRIDE (IP): Mod: JW | Performed by: EMERGENCY MEDICINE

## 2023-11-13 PROCEDURE — 93005 ELECTROCARDIOGRAM TRACING: CPT | Performed by: EMERGENCY MEDICINE

## 2023-11-13 PROCEDURE — 99292 CRITICAL CARE ADDL 30 MIN: CPT | Performed by: INTERNAL MEDICINE

## 2023-11-13 PROCEDURE — 86850 RBC ANTIBODY SCREEN: CPT

## 2023-11-13 PROCEDURE — 0042T CT-CEREBRAL PERFUSION ANALYSIS: CPT

## 2023-11-13 PROCEDURE — 86900 BLOOD TYPING SEROLOGIC ABO: CPT

## 2023-11-13 PROCEDURE — 93306 TTE W/DOPPLER COMPLETE: CPT

## 2023-11-13 PROCEDURE — 99291 CRITICAL CARE FIRST HOUR: CPT

## 2023-11-13 PROCEDURE — 70450 CT HEAD/BRAIN W/O DYE: CPT

## 2023-11-13 PROCEDURE — 36415 COLL VENOUS BLD VENIPUNCTURE: CPT

## 2023-11-13 PROCEDURE — 700111 HCHG RX REV CODE 636 W/ 250 OVERRIDE (IP): Performed by: INTERNAL MEDICINE

## 2023-11-13 PROCEDURE — 85025 COMPLETE CBC W/AUTO DIFF WBC: CPT

## 2023-11-13 PROCEDURE — 96374 THER/PROPH/DIAG INJ IV PUSH: CPT | Mod: XU

## 2023-11-13 PROCEDURE — 71045 X-RAY EXAM CHEST 1 VIEW: CPT

## 2023-11-13 PROCEDURE — 85730 THROMBOPLASTIN TIME PARTIAL: CPT

## 2023-11-13 PROCEDURE — 83735 ASSAY OF MAGNESIUM: CPT | Mod: 91

## 2023-11-13 PROCEDURE — 84484 ASSAY OF TROPONIN QUANT: CPT

## 2023-11-13 PROCEDURE — 82962 GLUCOSE BLOOD TEST: CPT

## 2023-11-13 PROCEDURE — 99284 EMERGENCY DEPT VISIT MOD MDM: CPT

## 2023-11-13 PROCEDURE — 83036 HEMOGLOBIN GLYCOSYLATED A1C: CPT

## 2023-11-13 PROCEDURE — 770022 HCHG ROOM/CARE - ICU (200)

## 2023-11-13 PROCEDURE — 80053 COMPREHEN METABOLIC PANEL: CPT

## 2023-11-13 PROCEDURE — 99222 1ST HOSP IP/OBS MODERATE 55: CPT | Performed by: PSYCHIATRY & NEUROLOGY

## 2023-11-13 PROCEDURE — 96374 THER/PROPH/DIAG INJ IV PUSH: CPT

## 2023-11-13 PROCEDURE — 86901 BLOOD TYPING SEROLOGIC RH(D): CPT

## 2023-11-13 PROCEDURE — 99291 CRITICAL CARE FIRST HOUR: CPT | Performed by: INTERNAL MEDICINE

## 2023-11-13 PROCEDURE — 70496 CT ANGIOGRAPHY HEAD: CPT

## 2023-11-13 PROCEDURE — 93306 TTE W/DOPPLER COMPLETE: CPT | Mod: 26 | Performed by: INTERNAL MEDICINE

## 2023-11-13 PROCEDURE — 94760 N-INVAS EAR/PLS OXIMETRY 1: CPT

## 2023-11-13 PROCEDURE — 700117 HCHG RX CONTRAST REV CODE 255: Performed by: EMERGENCY MEDICINE

## 2023-11-13 PROCEDURE — 85610 PROTHROMBIN TIME: CPT

## 2023-11-13 PROCEDURE — 70498 CT ANGIOGRAPHY NECK: CPT

## 2023-11-13 RX ORDER — ACETAMINOPHEN 325 MG/1
650 TABLET ORAL EVERY 6 HOURS PRN
Status: DISCONTINUED | OUTPATIENT
Start: 2023-11-13 | End: 2023-11-14 | Stop reason: HOSPADM

## 2023-11-13 RX ORDER — LABETALOL HYDROCHLORIDE 5 MG/ML
10 INJECTION, SOLUTION INTRAVENOUS
Status: DISCONTINUED | OUTPATIENT
Start: 2023-11-13 | End: 2023-11-14 | Stop reason: HOSPADM

## 2023-11-13 RX ORDER — ATORVASTATIN CALCIUM 40 MG/1
40 TABLET, FILM COATED ORAL EVERY EVENING
Status: DISCONTINUED | OUTPATIENT
Start: 2023-11-13 | End: 2023-11-14 | Stop reason: HOSPADM

## 2023-11-13 RX ORDER — AMOXICILLIN 250 MG
2 CAPSULE ORAL 2 TIMES DAILY
Status: DISCONTINUED | OUTPATIENT
Start: 2023-11-13 | End: 2023-11-14 | Stop reason: HOSPADM

## 2023-11-13 RX ORDER — ONDANSETRON 4 MG/1
4 TABLET, ORALLY DISINTEGRATING ORAL EVERY 4 HOURS PRN
Status: DISCONTINUED | OUTPATIENT
Start: 2023-11-13 | End: 2023-11-14 | Stop reason: HOSPADM

## 2023-11-13 RX ORDER — BISACODYL 10 MG
10 SUPPOSITORY, RECTAL RECTAL
Status: DISCONTINUED | OUTPATIENT
Start: 2023-11-13 | End: 2023-11-14 | Stop reason: HOSPADM

## 2023-11-13 RX ORDER — MAGNESIUM SULFATE HEPTAHYDRATE 40 MG/ML
4 INJECTION, SOLUTION INTRAVENOUS ONCE
Status: COMPLETED | OUTPATIENT
Start: 2023-11-13 | End: 2023-11-13

## 2023-11-13 RX ORDER — POLYETHYLENE GLYCOL 3350 17 G/17G
1 POWDER, FOR SOLUTION ORAL
Status: DISCONTINUED | OUTPATIENT
Start: 2023-11-13 | End: 2023-11-14 | Stop reason: HOSPADM

## 2023-11-13 RX ORDER — SENNOSIDES 8.6 MG
650-1300 CAPSULE ORAL EVERY 6 HOURS PRN
COMMUNITY

## 2023-11-13 RX ORDER — HYDRALAZINE HYDROCHLORIDE 20 MG/ML
10 INJECTION INTRAMUSCULAR; INTRAVENOUS
Status: COMPLETED | OUTPATIENT
Start: 2023-11-13 | End: 2023-11-14

## 2023-11-13 RX ORDER — ONDANSETRON 2 MG/ML
4 INJECTION INTRAMUSCULAR; INTRAVENOUS EVERY 4 HOURS PRN
Status: DISCONTINUED | OUTPATIENT
Start: 2023-11-13 | End: 2023-11-14 | Stop reason: HOSPADM

## 2023-11-13 RX ADMIN — HYDRALAZINE HYDROCHLORIDE 10 MG: 20 INJECTION, SOLUTION INTRAMUSCULAR; INTRAVENOUS at 15:00

## 2023-11-13 RX ADMIN — TENECTEPLASE 18 MG: KIT at 12:58

## 2023-11-13 RX ADMIN — IOHEXOL 40 ML: 350 INJECTION, SOLUTION INTRAVENOUS at 12:35

## 2023-11-13 RX ADMIN — MAGNESIUM SULFATE HEPTAHYDRATE 4 G: 4 INJECTION, SOLUTION INTRAVENOUS at 18:42

## 2023-11-13 RX ADMIN — IOHEXOL 80 ML: 350 INJECTION, SOLUTION INTRAVENOUS at 12:37

## 2023-11-13 ASSESSMENT — COGNITIVE AND FUNCTIONAL STATUS - GENERAL
DAILY ACTIVITIY SCORE: 22
STANDING UP FROM CHAIR USING ARMS: A LITTLE
MOBILITY SCORE: 21
SUGGESTED CMS G CODE MODIFIER DAILY ACTIVITY: CJ
WALKING IN HOSPITAL ROOM: A LITTLE
HELP NEEDED FOR BATHING: A LITTLE
DRESSING REGULAR UPPER BODY CLOTHING: A LITTLE
TURNING FROM BACK TO SIDE WHILE IN FLAT BAD: A LITTLE
SUGGESTED CMS G CODE MODIFIER MOBILITY: CJ

## 2023-11-13 ASSESSMENT — ENCOUNTER SYMPTOMS
DOUBLE VISION: 0
DIZZINESS: 0
SPEECH CHANGE: 0
BLURRED VISION: 0
NAUSEA: 0
FLANK PAIN: 0
SORE THROAT: 0
BRUISES/BLEEDS EASILY: 0
HEADACHES: 0
NERVOUS/ANXIOUS: 0
ABDOMINAL PAIN: 0
NECK PAIN: 0
PHOTOPHOBIA: 0
DEPRESSION: 0
SHORTNESS OF BREATH: 0
BACK PAIN: 0
SENSORY CHANGE: 0
CHILLS: 0
FEVER: 0
COUGH: 0
VOMITING: 0

## 2023-11-13 ASSESSMENT — LIFESTYLE VARIABLES
HAVE YOU EVER FELT YOU SHOULD CUT DOWN ON YOUR DRINKING: NO
CONSUMPTION TOTAL: NEGATIVE
TOTAL SCORE: 0
ON A TYPICAL DAY WHEN YOU DRINK ALCOHOL HOW MANY DRINKS DO YOU HAVE: 1
ALCOHOL_USE: YES
HAVE PEOPLE ANNOYED YOU BY CRITICIZING YOUR DRINKING: NO
TOTAL SCORE: 0
EVER HAD A DRINK FIRST THING IN THE MORNING TO STEADY YOUR NERVES TO GET RID OF A HANGOVER: NO
TOTAL SCORE: 0
HOW MANY TIMES IN THE PAST YEAR HAVE YOU HAD 5 OR MORE DRINKS IN A DAY: 0
DOES PATIENT WANT TO STOP DRINKING: NO
AVERAGE NUMBER OF DAYS PER WEEK YOU HAVE A DRINK CONTAINING ALCOHOL: 7
EVER FELT BAD OR GUILTY ABOUT YOUR DRINKING: NO

## 2023-11-13 ASSESSMENT — FIBROSIS 4 INDEX
FIB4 SCORE: 1.336306209562121923
FIB4 SCORE: 1.583333333333333333

## 2023-11-13 ASSESSMENT — PATIENT HEALTH QUESTIONNAIRE - PHQ9
2. FEELING DOWN, DEPRESSED, IRRITABLE, OR HOPELESS: NOT AT ALL
1. LITTLE INTEREST OR PLEASURE IN DOING THINGS: NOT AT ALL
SUM OF ALL RESPONSES TO PHQ9 QUESTIONS 1 AND 2: 0

## 2023-11-13 NOTE — ED PROVIDER NOTES
"  ER Provider Note    Scribed for Stephane Porter M.D. by Kristen Esteban. 11/13/2023   12:15 PM    Primary Care Provider: Aleta Mittal D.O.    CHIEF COMPLAINT  Chief Complaint   Patient presents with    Weakness    Dizziness     EXTERNAL RECORDS REVIEWED  Outpatient Notes: The patient was seen by ortho on 9/7/23 for unrelated complaints.    HPI/ROS  LIMITATION TO HISTORY   Select: : None  OUTSIDE HISTORIAN(S):  None noted    Joshua Cross is a 83 y.o. male who presents to the ED as a code stroke symptoms noticed at 11:30 AM. Per EMS, the patient was at [facility] when he had a sudden onset of dizziness and left sided weakness. He denies any pain. He takes his normal regimen of aspirin. He denies taking any blood thinners. He denies any history of stroke or head bleed.    PAST MEDICAL HISTORY  Past Medical History:   Diagnosis Date    Alcohol use 2014    \"2 drinks per day\"    Anemia     Back pain 2014    Bowel habit changes     crohn's    Cancer (HCC)     basal and squamous cell skin    Cholesterol blood decreased     Crohn's disease (HCC)     Dental disorder     2 implants, # 3 and 12    Heart burn     Hepatitis A 1980    High cholesterol     Hypertension     Liver abscess 2011    Pneumonia 1995    Skin cancer     Umbilical hernia        SURGICAL HISTORY  Past Surgical History:   Procedure Laterality Date    PB SHLDR ARTHROSCOP,SURG,W/ROTAT CUFF REPB Left 3/8/2023    Procedure: LEFT SHOULDER ARTHROSCOPY ROTATOR CUFF REPAIR;  Surgeon: Gordon Zaldivar M.D.;  Location: [facility];  Service: Orthopedics    CA SHLDR ARTHROSCOP PART DEBRIDE 1-2 Left 3/8/2023    Procedure: LEFT LABRAL DEBRIDEMENT;  Surgeon: Gordon Zaldivar M.D.;  Location: [facility];  Service: Orthopedics    CA SHLDR ARTHROSCOP,PART ACROMIOPLAS Left 3/8/2023    Procedure: LEFT SUBACROMIAL DECOMPRESSION;  Surgeon: Gordon Zaldivar M.D.;  Location: [facility];  Service: Orthopedics    PB " REPAIR BICEPS LONG TENDON Left 3/8/2023    Procedure: LEFT BICEPS TENODESIS, REPAIRS AS INDICATED;  Surgeon: Gordon Zaldivar M.D.;  Location: [facility];  Service: Orthopedics    BONE MARROW ASPIRATION Left 2/13/2019    Procedure: BONE MARROW ASPIRATION;  Surgeon: Jay Hinojosa M.D.;  Location: ENDOSCOPY [facility];  Service: Orthopedics    BONE MARROW BIOPSY, NDL/TROCAR Left 2/13/2019    Procedure: BONE MARROW BIOPSY, NDL/TROCAR - GUERARD;  Surgeon: Jay Hinojosa M.D.;  Location: ENDOSCOPY [facility];  Service: Orthopedics    VENTRAL HERNIA REPAIR LAPAROSCOPIC  5/8/2014    Performed by Mir Hyatt M.D. at SURGERY [facility]    OH RESECT SMALL INTEST,EACH ADDNL  2012    DX-DRAIN-LIVER ABSCESS-CYST  2011    TONSILLECTOMY  1949       FAMILY HISTORY  None noted    SOCIAL HISTORY   reports that he quit smoking about 51 years ago. His smoking use included cigarettes. He started smoking about 61 years ago. He has a 10.0 pack-year smoking history. He has never used smokeless tobacco. He reports current alcohol use. He reports that he does not use drugs.    CURRENT MEDICATIONS  Previous Medications    ACETAMINOPHEN (TYLENOL 8 HOUR ARTHRITIS PAIN) 650 MG CR TABLET    Take 650-1,300 mg by mouth every 6 hours as needed. Indications: Pain    ASPIRIN 81 MG EC TABLET    Take 81 mg by mouth every day.    CHLORTHALIDONE (HYGROTON) 50 MG TAB    Take 50 mg by mouth every day.    CYANOCOBALAMIN (VITAMIN B-12 SL)    Place 1 Tab under tongue every day.    LOSARTAN (COZAAR) 25 MG TAB    Take 25 mg by mouth every day.    MULTIPLE VITAMIN (MULTI-VITAMIN DAILY PO)    Take 1 Tablet by mouth every day.    SIMVASTATIN (ZOCOR) 40 MG TABS    Take 40 mg by mouth every morning.    USTEKINUMAB (STELARA) 90 MG/ML SOLUTION PREFILLED SYRINGE    Inject 90 mg under the skin Every 60 days.    VITAMIN D PO    Take 1 Capsule by mouth every day.       ALLERGIES  No Known Allergies     PHYSICAL EXAM  BP (!) 182/76    Pulse 61   Temp 36.7 °C (98 °F) (Temporal)   Resp (!) 24   SpO2 98%      Nursing note and vitals reviewed.  Constitutional: Well-developed and well-nourished. No distress.   HENT: Head is normocephalic and atraumatic. Oropharynx is clear and moist without exudate or erythema.   Eyes: Pupils are equal, round, and reactive to light. Conjunctiva are normal.   Cardiovascular: Normal rate and regular rhythm. No murmur heard. Normal radial pulses.  Pulmonary/Chest: Breath sounds normal. No wheezes or rales.   Abdominal: Soft and non-tender. No distention    Musculoskeletal: Extremities exhibit normal range of motion without edema or tenderness.   Neurological: Awake, alert and oriented to person, place, and time. Mild word finding difficulty, weakness in lifting arm and leg against gravity NIH of 6  Skin: Skin is warm and dry. No rash.   Psychiatric: Normal mood and affect. Appropriate for clinical situation    DIAGNOSTIC STUDIES    Labs:   Results for orders placed or performed during the hospital encounter of 11/13/23   CBC WITH DIFFERENTIAL   Result Value Ref Range    WBC 6.0 4.8 - 10.8 K/uL    RBC 3.18 (L) 4.70 - 6.10 M/uL    Hemoglobin 10.3 (L) 14.0 - 18.0 g/dL    Hematocrit 31.9 (L) 42.0 - 52.0 %    .3 (H) 81.4 - 97.8 fL    MCH 32.4 27.0 - 33.0 pg    MCHC 32.3 32.3 - 36.5 g/dL    RDW 49.2 35.9 - 50.0 fL    Platelet Count 249 164 - 446 K/uL    MPV 9.4 9.0 - 12.9 fL    Neutrophils-Polys 65.60 44.00 - 72.00 %    Lymphocytes 21.50 (L) 22.00 - 41.00 %    Monocytes 9.90 0.00 - 13.40 %    Eosinophils 1.80 0.00 - 6.90 %    Basophils 0.50 0.00 - 1.80 %    Immature Granulocytes 0.70 0.00 - 0.90 %    Nucleated RBC 0.00 0.00 - 0.20 /100 WBC    Neutrophils (Absolute) 3.90 1.82 - 7.42 K/uL    Lymphs (Absolute) 1.28 1.00 - 4.80 K/uL    Monos (Absolute) 0.59 0.00 - 0.85 K/uL    Eos (Absolute) 0.11 0.00 - 0.51 K/uL    Baso (Absolute) 0.03 0.00 - 0.12 K/uL    Immature Granulocytes (abs) 0.04 0.00 - 0.11 K/uL    NRBC  (Absolute) 0.00 K/uL   COMP METABOLIC PANEL   Result Value Ref Range    Sodium 142 135 - 145 mmol/L    Potassium 5.5 3.6 - 5.5 mmol/L    Chloride 111 96 - 112 mmol/L    Co2 18 (L) 20 - 33 mmol/L    Anion Gap 13.0 7.0 - 16.0    Glucose 84 65 - 99 mg/dL    Bun 55 (H) 8 - 22 mg/dL    Creatinine 2.48 (H) 0.50 - 1.40 mg/dL    Calcium 8.5 8.4 - 10.2 mg/dL    Correct Calcium 8.3 (L) 8.5 - 10.5 mg/dL    AST(SGOT) 15 12 - 45 U/L    ALT(SGPT) 14 2 - 50 U/L    Alkaline Phosphatase 75 30 - 99 U/L    Total Bilirubin 0.2 0.1 - 1.5 mg/dL    Albumin 4.3 3.2 - 4.9 g/dL    Total Protein 6.3 6.0 - 8.2 g/dL    Globulin 2.0 1.9 - 3.5 g/dL    A-G Ratio 2.2 g/dL   PROTHROMBIN TIME   Result Value Ref Range    PT 14.2 12.0 - 14.6 sec    INR 1.04 0.87 - 1.13   APTT   Result Value Ref Range    APTT 26.8 24.7 - 36.0 sec   COD (ADULT)   Result Value Ref Range    ABO Grouping Only O     Rh Grouping Only POS    TROPONIN   Result Value Ref Range    Troponin T 25 (H) 6 - 19 ng/L   ABO Rh Confirm   Result Value Ref Range    ABO Rh Confirm O POS    ESTIMATED GFR   Result Value Ref Range    GFR (CKD-EPI) 25 (A) >60 mL/min/1.73 m 2   EKG (NOW)   Result Value Ref Range    Report       [facility]    Test Date:  2023  Pt Name:    JACK SIMPSON                 Department: [facility]  MRN:        [id number]                      Room:       -ROOM 7  Gender:     Male                         Technician: [id number]  :        [id number]                   Requested By:DAMIEN WYNNE  Order #:    [id number]                    Reading MD:    Measurements  Intervals                                Axis  Rate:       62                           P:          73  NJ:         227                          QRS:        -34  QRSD:       100                          T:          33  QT:         446  QTc:        453    Interpretive Statements  Sinus rhythm  Prolonged NJ interval  Left axis deviation  Compared to ECG 2014 08:49:19  First  degree AV block now present  Sinus bradycardia no longer present         EKG:   I have independently interpreted this EKG as detailed above.     Radiology:   This attending emergency physician has independently interpreted the diagnostic imaging associated with this visit and is awaiting the final reading from the radiologist.   Preliminary interpretation is a follows: CT head shows no evidence of intracranial hemorrhage.  Chest x-ray shows no acute abnormality    Radiologist interpretation:   DX-CHEST-PORTABLE (1 VIEW)   Final Result         1. No acute cardiopulmonary abnormalities are identified.      CT-CTA NECK WITH & W/O-POST PROCESSING   Final Result      1. No evidence of flow-limiting stenosis in the cervical carotid or cervical vertebral arteries.      CT-CEREBRAL PERFUSION ANALYSIS   Final Result      1.  Cerebral blood flow less than 30% likely representing completed infarct = 0 mL.      2.  T Max more than 6 seconds likely representing combination of completed infarct and ischemia = 0 mL.      3.  Mismatched volume likely representing ischemic brain/penumbra = None      4.  Please note that the cerebral perfusion was performed on the limited brain tissue around the basal ganglia region. Infarct/ischemia outside the CT perfusion sections can be missed in this study.      CT-CTA HEAD WITH & W/O-POST PROCESS   Final Result         1. No hemodynamically significant narrowing of the major intracranial vessels.      CT-HEAD W/O   Final Result         1. No acute intracranial abnormality. No evidence of acute intracranial hemorrhage or mass lesion.                          INITIAL ASSESSMENT AND PLAN    12:15 PM - Patient was evaluated at bedside as a code stroke. Ordered for EKG, Troponin, COD, APTT, Prothrombin Time, CMP, CBC with diff, CT-CTA Neck with and without, CT-CTA Head with and without, CT-Cerebral Perfusion, CT-Head without and Dx-chest to evaluate. Patient verbalizes understanding and support with  my plan of care.      ED Observation Status? No; Patient does not meet criteria for ED Observation.      COURSE AND MEDICAL DECISION MAKING  12:35 PM I discussed the patient's case and the above findings with Dr. Pelletier (Neurology) who recommends bolus on TNK and transfer to [facility]. Will contact transfer center. Dr. Pelletier interpreted the perfusion scan as normal. He would like a blood pressure goal of less than 180/105 after TNK.    12:40 PM I discussed the patient's case and the above findings with Dr. Lubin (ERP) who will accept the patient for transfer. Patient updated on the plan of care.    12:43 PM - The patient remains in imaging. I discussed with the wife and she would like to proceed with TNK. I discussed risks and benefits.    12:58 PM - TNK Bolus administered.    Patient was treated with thrombolytics here in the emergency department.  Transferred to [facility] for specialty care.  Accepting physician Dr. Lubin.    CRITICAL CARE  The very real possibilty of a deterioration of this patient's condition required the highest level of my preparedness for sudden, emergent intervention.  I provided critical care services, which included medication orders, frequent reevaluations of the patient's condition and response to treatment, ordering and reviewing test results, and discussing the case with various consultants.  The critical care time associated with the care of the patient was 35 minutes. Review chart for interventions. This time is exclusive of any other billable procedures.   DISPOSITION AND DISCUSSIONS    I have discussed management of the patient with the following physicians and ALBERT's:  Dr. Pelletier, Neurology and Dr. Lubin, ERP    Discussion of management with other [facility] or appropriate source(s): None     DISPOSITION:  The patient will be transferred to [facility] for a higher level of care.    FINAL DIAGNOSIS  1. Cerebrovascular accident (CVA), unspecified mechanism  (HCC)    2. Left-sided weakness    3. Chronic renal impairment, unspecified CKD stage         I, Kristen Esteban (Scribe), am scribing for, and in the presence of, Stephane Porter M.D..    Electronically signed by: Kristen Esteban (Scribe), 11/13/2023    IStephane M.D. personally performed the services described in this documentation, as scribed by Kristen Esteban in my presence, and it is both accurate and complete.      The note accurately reflects work and decisions made by me.  Stephane Porter M.D.  11/13/2023  3:17 PM

## 2023-11-13 NOTE — ASSESSMENT & PLAN NOTE
[place] [facility]  ?hyperviscosity associated with Waldenstrom macroglobulinemia  Neuro checks every hour  SBP goals < 180  Labetalol/Hydralazine prn, titrating Nicardipine  ECHO pending  MRI brain today  PT/OT/SLP  High intensity statin

## 2023-11-13 NOTE — CONSULTS
Neurology Initial Consult H&P  Neurohospitalist Service, [facility]    Referring Physician: Daryn Lubin M.D.    Chief Complaint   Patient presents with    Possible Stroke     Patient is a transfer from [facility] for stroke.  LKW 1145.  Patient noted L side weakness and expressive aphasia.  Patient given TNK @ 1258  NIH 5 on arrival       HPI: Joshua Cross is a 83 year old man with hypertension, hyperlipidemia, who initially presented to [facility] with stroke-like symptoms.  He reports that he woke up well today, and went to to [facility] with his wife.  Around 1145, as he was getting out of the car- he felt foggy in thought and a sense of imbalance- as if he had just gotten off a boat.  He denies lateralizing weakness or michael vertigo.  Symptoms, particularly foggy thought persisted in the store.  His wife asked him if he wanted to go sit down in the car- and he reports unable to get words out, although he knew what he wanted to say.  They drove home, but decided to stop by [facility] ER to seek medical attention.  There, his LUE appeared weak- although he attributes this to an essential tremor in that arm that is exacerbated by anxiety.  A stroke protocol CT without evidence of large vessel occlusion, critical stenoses, or a perfusion defect.  He reports that he takes a daily ASA.  He is compliant with medications.  Stroke Neurology consulted and recommend IV-thrombolytic therapy which was given at 100PM.  He was transferred for post-TNK care.  On arrival to [facility], NIHSS 0 and patient feels subjectively at his neurologic baseline.  On ROS, denies infectious prodrome or constitutional symptoms.    Review of systems: In addition to what is detailed in the HPI above, all other systems reviewed and are negative.    Past Medical History:    has a past medical history of Alcohol use (2014), Anemia, Back pain (2014), Bowel habit changes, Cancer (HCC), Cholesterol blood decreased, Crohn's disease  (HCC), Dental disorder, Heart burn, Hepatitis A (1980), High cholesterol, Hypertension, Liver abscess (2011), Pneumonia (1995), Skin cancer, and Umbilical hernia.    FHx:  No history of early strokes (before age 50) or blood clotting disorder in the family.    SHx:   reports that he quit smoking about 51 years ago. His smoking use included cigarettes. He started smoking about 61 years ago. He has a 10.0 pack-year smoking history. He has never used smokeless tobacco. He reports current alcohol use. He reports that he does not use drugs.    Allergies:  No Known Allergies    Medications:    Current Facility-Administered Medications:     labetalol (Normodyne/Trandate) injection 10 mg, 10 mg, Intravenous, Q10 MIN PRN, Sadie Becerra M.D.    hydrALAZINE (Apresoline) injection 10 mg, 10 mg, Intravenous, Q2HRS PRN, Sadie Becerra M.D., 10 mg at 11/13/23 1500    niCARdipine (Cardene) 25 mg in  mL Standard Infusion, 0-15 mg/hr, Intravenous, Continuous, Sadie Becerra M.D.    atorvastatin (Lipitor) tablet 40 mg, 40 mg, Oral, Q EVENING, Sadie Becerra M.D.    senna-docusate (Pericolace Or Senokot S) 8.6-50 MG per tablet 2 Tablet, 2 Tablet, Oral, BID **AND** polyethylene glycol/lytes (Miralax) PACKET 1 Packet, 1 Packet, Oral, QDAY PRN **AND** magnesium hydroxide (Milk Of Magnesia) suspension 30 mL, 30 mL, Oral, QDAY PRN **AND** bisacodyl (Dulcolax) suppository 10 mg, 10 mg, Rectal, QDAY PRN, Sadie Becerra M.D.    acetaminophen (Tylenol) tablet 650 mg, 650 mg, Oral, Q6HRS PRN, Sadie Becerra M.D.    ondansetron (Zofran) syringe/vial injection 4 mg, 4 mg, Intravenous, Q4HRS PRN, Sadie Becerra M.D.    ondansetron (Zofran ODT) dispertab 4 mg, 4 mg, Oral, Q4HRS PRN, Sadie Becerra M.D.    Current Outpatient Medications:     acetaminophen (TYLENOL 8 HOUR ARTHRITIS PAIN) 650 MG CR tablet, Take 650-1,300 mg by mouth every 6 hours as needed. Indications: Pain, Disp: , Rfl:     aspirin 81 MG EC tablet, Take  "81 mg by mouth every day., Disp: , Rfl:     Multiple Vitamin (MULTI-VITAMIN DAILY PO), Take 1 Tablet by mouth every day., Disp: , Rfl:     Ustekinumab (STELARA) 90 MG/ML Solution Prefilled Syringe, Inject 90 mg under the skin Every 60 days., Disp: , Rfl:     chlorthalidone (HYGROTON) 50 MG Tab, Take 50 mg by mouth every day., Disp: , Rfl:     Cyanocobalamin (VITAMIN B-12 SL), Place 1 Tab under tongue every day., Disp: , Rfl:     VITAMIN D PO, Take 1 Capsule by mouth every day., Disp: , Rfl:     losartan (COZAAR) 25 MG Tab, Take 25 mg by mouth every day., Disp: , Rfl:     simvastatin (ZOCOR) 40 MG TABS, Take 40 mg by mouth every morning., Disp: , Rfl:     Physical Examination:     General: Patient is awake and in no acute distress  Eye: Examination of optic disks not indicated at this time given acuity of consult  Neck: There is normal range of motion  CV: regular rate   Extremities:  clear, dry, intact, without peripheral edema    NEUROLOGICAL EXAM:     BP (!) 165/70   Pulse 61   Temp 36.7 °C (98 °F) (Temporal)   Resp (!) 30   Ht 1.803 m (5' 11\")   Wt 71.7 kg (158 lb)   SpO2 97%   BMI 22.04 kg/m²       Mental status: Awake, alert and fully oriented  Speech and language: Speech is clear and fluent. The patient is able to name and repeat, and follow commands  Cranial nerve exam: Visual fields are full. There is no nystagmus. Extraocular muscles are intact. Face is symmetric.   Motor exam: There is sustained antigravity with no downward drift in bilateral arms and legs.    Sensory exam: Reacts to tactile in all 4 extremities, no neglect to double stim   Coordination: No ataxia on bilateral finger-to-nose testing  Gait: Deferred due to patient preference      NIHSS: National Institutes of Health Stroke Scale    [0] 1a:Level of Consciousness    0-alert 1-drowsy   2-stupor   3-coma  [0] 1b:LOC Questions                  0-both  1-one      2-neither  [0] 1c:LOC Commands                   0-both  1-one      " 2-neither  [0] 2: Best Gaze                     0-nl    1-partial  2-forced  [0] 3: Visual Fields                   0-nl    1-partial  2-complete 3-bilat  [0] 4: Facial Paresis                0-nl    1-minor    2-partial  3-full  MOTOR                       0-nl  [0] 5: Right Arm           1-drift  [0] 6: Left Arm             2-some effort vs gravity  [0] 7: Right Leg           3-no effort vs gravity  [0] 8: Left Leg             4-no movement                             x-untestable  [0] 9: Limb Ataxia                    0-abs   1-1_limb   2-2+_limbs       x-untestable  [0] 10:Sensory                        0-nl    1-partial  2-dense  [0] 11:Best Language/Aphasia         0-nl    1-mild/mod 2-severe   3-mute  [0] 12:Dysarthria                     0-nl    1-mild/mod 2-severe       x-untestable  [0] 13:Neglect/Inattention            0-none  1-partial  2-complete  [0] TOTAL      Objective Data:    Labs:  Lab Results   Component Value Date/Time    PROTHROMBTM 14.2 11/13/2023 12:14 PM    INR 1.04 11/13/2023 12:14 PM      Lab Results   Component Value Date/Time    WBC 6.0 11/13/2023 12:14 PM    RBC 3.18 (L) 11/13/2023 12:14 PM    HEMOGLOBIN 10.3 (L) 11/13/2023 12:14 PM    HEMATOCRIT 31.9 (L) 11/13/2023 12:14 PM    .3 (H) 11/13/2023 12:14 PM    MCH 32.4 11/13/2023 12:14 PM    MCHC 32.3 11/13/2023 12:14 PM    MPV 9.4 11/13/2023 12:14 PM    NEUTSPOLYS 65.60 11/13/2023 12:14 PM    LYMPHOCYTES 21.50 (L) 11/13/2023 12:14 PM    MONOCYTES 9.90 11/13/2023 12:14 PM    EOSINOPHILS 1.80 11/13/2023 12:14 PM    BASOPHILS 0.50 11/13/2023 12:14 PM      Lab Results   Component Value Date/Time    SODIUM 142 11/13/2023 12:14 PM    POTASSIUM 5.5 11/13/2023 12:14 PM    CHLORIDE 111 11/13/2023 12:14 PM    CO2 18 (L) 11/13/2023 12:14 PM    GLUCOSE 84 11/13/2023 12:14 PM    BUN 55 (H) 11/13/2023 12:14 PM    CREATININE 2.48 (H) 11/13/2023 12:14 PM      Lab Results   Component Value Date/Time    CHOLSTRLTOT 116 06/07/2023 08:51 AM    LDL 47  06/07/2023 08:51 AM    HDL 48 06/07/2023 08:51 AM    TRIGLYCERIDE 107 06/07/2023 08:51 AM       Lab Results   Component Value Date/Time    ALKPHOSPHAT 75 11/13/2023 12:14 PM    ASTSGOT 15 11/13/2023 12:14 PM    ALTSGPT 14 11/13/2023 12:14 PM    TBILIRUBIN 0.2 11/13/2023 12:14 PM        Imaging/Testing:    I interpreted and/or reviewed the patient's neuroimaging    EC-ECHOCARDIOGRAM COMPLETE W/O CONT    (Results Pending)     CT angiogram of head and neck:  No large vessel occlusion, no critical stenoses.  CT head:  No hemorrhage or large territory stroke    Assessment and Plan:  Joshua Cross is a 83 year old man with hypertension, hyperlipidemia, presenting with transient expressive aphasia, dysequilibrium.  He is now s/p IV-TNK with subjective improvement in symptoms.  CTA without evidence of LVO amenable to endovascular therapy, or critical stenoses requiring surgical revascularization.  Will treat as minor stroke/TIA- attain MRI, ECHO, ziopatch monitoring.  Will re-initiate ASA, statin for long-term stroke prevention after 24 hours.    Problem list:  Transient neurologic symptoms  Hypertension  Hyperlipidemia    Plan:   - admit to RICU, neurochecks/NIHSS per post-TNK protocol   - BP goal is STRICT < 180/105.  Acutely may aim for SBP goal 140-180, cardene PRN.  In absence of ischemic penumbra, no indication for acute hypertensive response- may start PO anti-HTN and titrate to long-term goal 110-130/60-80   - expedite MRI brain without contrast, does not need to wait 24 hours   - repeat head CT only if there is clinical deterioration   - stroke labs:  HgbA1c and lipid panel   - start atorvastatin 40mg daily for goal LDL < 70, titrate dose to LDL results   - TTE without bubble, ziopatch at discharge   - no antithrombotic therapy, SCDs only x 24 hours, anticipate restarting ASA therapy, timing TBD pending MRI results   - PT/OT/SLP ok even within first 24 hours   - stroke bridge clinic follow up    The  evaluation of the patient, and recommended management, was discussed with Dr. Lubin, ER attending, and Dr. Becerra, ICU attending.    Rafal Pelletier MD  Vascular Neurology

## 2023-11-13 NOTE — ED NOTES
"Med rec completed by Signifyd tech at [facility] on 11/13/23, prior to transfer:    \"     Medication history reviewed with pts [facility] pharmacy and [facility]'s pharmacy. Med rec is complete.  Allergies reviewed, per pts wife     Pt not able to tell me what medications he is taking.  Pts wife is not sure what medications he is taking or when he took them last.  Called [facility] ([phone]) to verify all medications.       Called [facility] pharmacy @ [phone] to verify all medications, per [facility] pharmacy pt last filled any medications was on 12/2022.     Patient has not had any outpatient antibiotics in the last 30 days.     Pt is not on any anticoagulants              \"    "

## 2023-11-13 NOTE — H&P
Critical Care History & Physical Note    Date of Service  11/13/2023    Primary Care Physician  Aleta Mittal D.O.    Consultants  critical care and neurology    Specialist Names: Dr. Noe Pelletier    Code Status  Full Code    Chief Complaint  Chief Complaint   Patient presents with    Possible Stroke     Patient is a transfer from [facility] for stroke.  LKW 1145.  Patient noted L side weakness and expressive aphasia.  Patient given TNK @ 1258  NIH 5 on arrival       History of Presenting Illness  Joshua Cross is a 83 y.o. male with a past medical history significant for hypertension, chronic kidney disease, and Waldenstrom macroglobulinemia who presented to the ER at [facility] on 11/13/2023 with was of vertigo and feeling unbalanced.  He has been in his usual state of health and went shopping with his wife at [facility] when he had sudden onset of vertigo and feeling unbalanced.  The patient rested in the car and then proceeded to go into [facility] where he had difficulty with speech with aphasia, slight left arm weakness, difficulty walking, and felt that he was in a fog.  Due to concerns of having a stroke the patient was brought to [facility] and a CT head was obtained that did not show bleed.  Neurology was consulted and felt that he was a good candidate for TNKase.  The patient was then transferred to [facility] for ongoing subspecialty care.    I discussed the plan of care with patient, family, bedside RN, charge RN, pharmacy, and neurology.    Review of Systems  Review of Systems   Constitutional:  Positive for malaise/fatigue. Negative for chills and fever.   HENT:  Negative for congestion, nosebleeds and sore throat.    Eyes:  Negative for blurred vision, double vision and photophobia.   Respiratory:  Negative for cough and shortness of breath.    Cardiovascular:  Negative for chest pain and leg swelling.   Gastrointestinal:  Negative for abdominal  pain, nausea and vomiting.   Genitourinary:  Negative for flank pain and hematuria.   Musculoskeletal:  Negative for back pain and neck pain.   Skin:  Negative for rash.   Neurological:  Negative for dizziness, sensory change, speech change and headaches.   Endo/Heme/Allergies:  Does not bruise/bleed easily.   Psychiatric/Behavioral:  Negative for depression. The patient is not nervous/anxious.        Past Medical History   has a past medical history of Alcohol use (2014), Anemia, Back pain (2014), Bowel habit changes, Cancer (HCC), Cholesterol blood decreased, Crohn's disease (HCC), Dental disorder, Heart burn, Hepatitis A (1980), High cholesterol, Hypertension, Liver abscess (2011), Pneumonia (1995), Skin cancer, and Umbilical hernia. Waldenstrom macroglobulinemia, CKD    Surgical History   has a past surgical history that includes DX-DRAIN-LIVER ABSCESS-CYST (2011); pr resect small intest,each addnl (2012); tonsillectomy (1949); ventral hernia repair laparoscopic (5/8/2014); bone marrow aspiration (Left, 2/13/2019); bone marrow biopsy, ndl/trocar (Left, 2/13/2019); pr shldr arthroscop,surg,w/rotat cuff repr (Left, 3/8/2023); pr shldr arthroscop part debride 1-2 (Left, 3/8/2023); pr shldr arthroscop,part acromioplas (Left, 3/8/2023); and pr repair biceps long tendon (Left, 3/8/2023).     Family History  Patient's father had a massive stroke in his 60s and his brother had a heart attack also in his 60s.  Family history reviewed with patient. There is family history that is pertinent to the chief complaint.     Social History   reports that he quit smoking about 51 years ago. His smoking use included cigarettes. He started smoking about 61 years ago. He has a 10.0 pack-year smoking history. He has never used smokeless tobacco. He reports current alcohol use. He reports that he does not use drugs.  Pt is a retired dentist    Allergies  No Known Allergies    Medications  Prior to Admission Medications   Prescriptions  Last Dose Informant Patient Reported? Taking?   Cyanocobalamin (VITAMIN B-12 SL) UNK at [facility] Historical Yes No   Sig: Place 1 Tab under tongue every day.   Multiple Vitamin (MULTI-VITAMIN DAILY PO) UNK at [facility] Historical Yes No   Sig: Take 1 Tablet by mouth every day.   Ustekinumab (STELARA) 90 MG/ML Solution Prefilled Syringe UNK at [facility] Historical Yes No   Sig: Inject 90 mg under the skin Every 60 days.   VITAMIN D PO UNK at [facility] Historical Yes No   Sig: Take 1 Capsule by mouth every day.   acetaminophen (TYLENOL 8 HOUR ARTHRITIS PAIN) 650 MG CR tablet UNK at [facility] Historical Yes No   Sig: Take 650-1,300 mg by mouth every 6 hours as needed. Indications: Pain   aspirin 81 MG EC tablet UNK at [facility] Historical Yes No   Sig: Take 81 mg by mouth every day.   chlorthalidone (HYGROTON) 50 MG Tab UNK at [facility] Historical Yes No   Sig: Take 50 mg by mouth every day.   losartan (COZAAR) 25 MG Tab UNK at [facility] Historical Yes No   Sig: Take 25 mg by mouth every day.   simvastatin (ZOCOR) 40 MG TABS UNK at [facility] Historical Yes No   Sig: Take 40 mg by mouth every morning.      Facility-Administered Medications: None       Physical Exam  Temp:  [36.7 °C (98 °F)] 36.7 °C (98 °F)  Pulse:  [55-67] 64  Resp:  [19-24] 20  BP: (171-198)/(71-79) 177/71  SpO2:  [97 %-99 %] 98 %  Blood Pressure : (!) 177/71   Temperature: 36.7 °C (98 °F)   Pulse: 64   Respiration: 20   Pulse Oximetry: 98 %       Physical Exam  Vitals and nursing note reviewed.   Constitutional:       General: He is not in acute distress.     Appearance: He is ill-appearing. He is not toxic-appearing.   HENT:      Head: Normocephalic and atraumatic.      Right Ear: External ear normal.      Left Ear: External ear normal.      Nose: Nose normal. No rhinorrhea.      Mouth/Throat:      Mouth: Mucous membranes are moist.      Pharynx: Oropharynx is clear. No oropharyngeal exudate.   Eyes:      General: No scleral icterus.     Conjunctiva/sclera: Conjunctivae normal.      Pupils: Pupils are  "equal, round, and reactive to light.   Cardiovascular:      Rate and Rhythm: Normal rate and regular rhythm.      Pulses: Normal pulses.      Heart sounds: Normal heart sounds. No murmur heard.  Pulmonary:      Breath sounds: Normal breath sounds. No wheezing.   Chest:      Chest wall: No tenderness.   Abdominal:      Palpations: Abdomen is soft.      Tenderness: There is no abdominal tenderness. There is no guarding or rebound.   Musculoskeletal:         General: Normal range of motion.      Cervical back: Normal range of motion and neck supple.      Right lower leg: No edema.      Left lower leg: No edema.   Lymphadenopathy:      Cervical: No cervical adenopathy.   Skin:     General: Skin is warm and dry.      Capillary Refill: Capillary refill takes less than 2 seconds.      Findings: No rash.   Neurological:      Mental Status: He is alert and oriented to person, place, and time.      Cranial Nerves: No cranial nerve deficit.      Sensory: No sensory deficit.      Motor: No weakness.   Psychiatric:         Mood and Affect: Mood normal.         Behavior: Behavior normal.         Thought Content: Thought content normal.         Laboratory:  Recent Labs     11/13/23  1214   WBC 6.0   RBC 3.18*   HEMOGLOBIN 10.3*   HEMATOCRIT 31.9*   .3*   MCH 32.4   MCHC 32.3   RDW 49.2   PLATELETCT 249   MPV 9.4     Recent Labs     11/13/23  1214   SODIUM 142   POTASSIUM 5.5   CHLORIDE 111   CO2 18*   GLUCOSE 84   BUN 55*   CREATININE 2.48*   CALCIUM 8.5     Recent Labs     11/13/23  1214   ALTSGPT 14   ASTSGOT 15   ALKPHOSPHAT 75   TBILIRUBIN 0.2   GLUCOSE 84     Recent Labs     11/13/23  1214   APTT 26.8   INR 1.04     No results for input(s): \"NTPROBNP\" in the last 72 hours.      Recent Labs     11/13/23  1214   TROPONINT 25*       Imaging:  EC-ECHOCARDIOGRAM COMPLETE W/O CONT    (Results Pending)       Assessment/Plan:  Justification for Admission Status  I anticipate this patient will require at least two midnights for " appropriate medical management, necessitating inpatient admission because of an acute ischemic stroke requiring full work-up and ICU care.    Patient will need a ICU (Adult and Pediatrics) bed on MEDICAL service .  The need is secondary to acute ischemic stroke status post TNKase with hourly neurochecks and strict BP monitoring.    * Acute ischemic stroke (HCC)- (present on admission)  Assessment & Plan  S/p TNKase at [facility]  ?hyperviscosity associated with Waldenstrom macroglobulinemia  Neuro checks every hour  SBP goals < 180  Labetalol/Hydralazine prn, titrating Nicardipine  ECHO pending  MRI brain today  PT/OT/SLP  High intensity statin    Waldenstrom macroglobulinemia (HCC)- (present on admission)  Assessment & Plan  Following oncology as an outpatient  Stable     CKD (chronic kidney disease)- (present on admission)  Assessment & Plan  Unknown stage  Following nephrology as an outpatient    Macrocytic anemia- (present on admission)  Assessment & Plan  ?related to CKD  No obvious acute blood loss  Following     Hyperlipidemia- (present on admission)  Assessment & Plan  Cont home statin    Primary hypertension- (present on admission)  Assessment & Plan  Allowing permissive HTN  SBP goals < 180  Will resume home BP meds in next 1-2 days        VTE prophylaxis: SCDs/TEDs and pharmacologic prophylaxis contraindicated due to TNKase    The patient is critically ill at this time requiring hourly neurological checks after receiving TNKase as well as strict blood pressure management.  I have assessed and reassessed the patient's hemodynamics, neurological status, cardiovascular status, and laboratories.  The patient remains at high risk of clinical deterioration, worsening vital organ dysfunction, and death without the above critical care interventions.    Critical care time equals 106 minutes.

## 2023-11-13 NOTE — ED PROVIDER NOTES
ED Provider Note    CHIEF COMPLAINT  Chief Complaint   Patient presents with    Possible Stroke     Patient is a transfer from [facility] for stroke.  LKW 1145.  Patient noted L side weakness and expressive aphasia.  Patient given TNK @ 1258  NIH 5 on arrival       EXTERNAL RECORDS REVIEWED  Outpatient Notes rotator cuff repair    HPI/ROS  LIMITATION TO HISTORY   Select: : None  OUTSIDE HISTORIAN(S):  EMS      Joshua Cross is a 83 y.o. male who presents to the ED secondary to acute stroke.  The patient was seen and evaluated at [facility] and was given the TNK and transferred here for further evaluation and treatment.  The patient states around 1130 the patient was at [facility], felt vertiginous for approximately 30 seconds and then it went away.  Once he went into [facility] he started having aphasia, he also felt like his left arm was slightly weak he was having difficulty walking.  A CTs were performed out side hospital, neurology was consulted, the patient was given TNKase and sent here.  Currently the patient feels much improved.    PAST MEDICAL HISTORY   has a past medical history of Alcohol use (2014), Anemia, Back pain (2014), Bowel habit changes, Cancer (HCC), Cholesterol blood decreased, Crohn's disease (HCC), Dental disorder, Heart burn, Hepatitis A (1980), High cholesterol, Hypertension, Liver abscess (2011), Pneumonia (1995), Skin cancer, and Umbilical hernia.    SURGICAL HISTORY   has a past surgical history that includes DX-DRAIN-LIVER ABSCESS-CYST (2011); resect small intest,each addnl (2012); tonsillectomy (1949); ventral hernia repair laparoscopic (5/8/2014); bone marrow aspiration (Left, 2/13/2019); bone marrow biopsy, ndl/trocar (Left, 2/13/2019); shldr arthroscop,surg,w/rotat cuff repr (Left, 3/8/2023); shldr arthroscop part debride 1-2 (Left, 3/8/2023); shldr arthroscop,part acromioplas (Left, 3/8/2023); and repair biceps long tendon (Left, 3/8/2023).    FAMILY HISTORY  No family history  "on file.    SOCIAL HISTORY  Social History     Tobacco Use    Smoking status: Former     Current packs/day: 0.00     Average packs/day: 1 pack/day for 10.0 years (10.0 ttl pk-yrs)     Types: Cigarettes     Start date: 1962     Quit date: 1972     Years since quittin.5    Smokeless tobacco: Never   Vaping Use    Vaping Use: Never used   Substance and Sexual Activity    Alcohol use: Yes     Comment: 2 drinks per day, wine    Drug use: No    Sexual activity: Not on file       CURRENT MEDICATIONS  Home Medications       Reviewed by Bety Girard R.N. (Registered Nurse) on 23 at 1335  Med List Status: <None>     Medication Last Dose Status   acetaminophen (TYLENOL 8 HOUR ARTHRITIS PAIN) 650 MG CR tablet  Active   aspirin 81 MG EC tablet  Active   chlorthalidone (HYGROTON) 50 MG Tab  Active   Cyanocobalamin (VITAMIN B-12 SL)  Active   losartan (COZAAR) 25 MG Tab  Active   Multiple Vitamin (MULTI-VITAMIN DAILY PO)  Active   simvastatin (ZOCOR) 40 MG TABS  Active   Ustekinumab (STELARA) 90 MG/ML Solution Prefilled Syringe  Active   VITAMIN D PO  Active                    ALLERGIES  No Known Allergies    PHYSICAL EXAM  VITAL SIGNS: BP (!) 171/79   Pulse 67   Temp 36.7 °C (98 °F) (Temporal)   Resp 20   Ht 1.803 m (5' 11\")   Wt 71.7 kg (158 lb)   SpO2 98%   BMI 22.04 kg/m²    Well-developed well-nourished 83-year-old male who appears in mild distress  Atraumatic, normocephalic, oropharynx is clear  Neck is supple  Chest clear to auscultation  Regular rhythm  Neuro awake alert speech is clear cranial 2 through 12 grossly intact muscle strength is 5 of 5 throughout, normal speech    COURSE & MEDICAL DECISION MAKING    ED Observation Status? No; Patient does not meet criteria for ED Observation.     INITIAL ASSESSMENT, COURSE AND PLAN  Care Narrative: Patient is coming in after thrombolytics for stroke.  Discussed case with neurology, we will admit to the ICU, the patient seems much improved now.  " Discussed the case with intensivist for hospitalization.      DISPOSITION AND DISCUSSIONS  I have discussed management of the patient with the following physicians and ALBERT's: Intensivist    FINAL DIAGNOSIS  1. Acute CVA (cerebrovascular accident) (HCC)           Electronically signed by: Daryn Lubin M.D., 11/13/2023 1:53 PM

## 2023-11-13 NOTE — PROGRESS NOTES
4 Eyes Skin Assessment Completed by Gwen, RN and Jason RN.    Head Scab on top of head, Right eye bruising.   Ears WDL  Nose WDL  Mouth WDL  Neck WDL  Breast/Chest WDL  Shoulder Blades WDL  Spine WDL  (R) Arm/Elbow/Hand Bruising  (L) Arm/Elbow/Hand Redness and Bruising  Abdomen WDL  Groin Redness  Scrotum/Coccyx/Buttocks WDL  (R) Leg WDL  (L) Leg WDL  (R) Heel/Foot/Toe Redness  (L) Heel/Foot/Toe Redness          Devices In Places ECG, Blood Pressure Cuff, Pulse Ox, and SCD's      Interventions In Place Pillows    Possible Skin Injury No    Pictures Uploaded Into Epic N/A  Wound Consult Placed N/A  RN Wound Prevention Protocol Ordered No

## 2023-11-13 NOTE — ED TRIAGE NOTES
"Chief Complaint   Patient presents with    Weakness    Dizziness      Pt BIB wife. Was a [employer], felt dizzy, was able to walk out to his car, continued to feel \" weird\". Pt has LUE weakness, unable to speak and elaborate on symptoms in triage. Pt tearful and concerned he might have had a stroke. Code stroke called to triage. Pt was roomed immediately. Last known well was about 30 minutes ago, per wife.   "

## 2023-11-13 NOTE — ED TRIAGE NOTES
"Chief Complaint   Patient presents with    Possible Stroke     Patient is a transfer from [facility] for stroke.  LKW 1145.  Patient noted L side weakness and expressive aphasia.  Patient given TNK @ 1258  NIH 5 on arrival     82 yo male bib EMS from [facility] for above.    NIH 5 on arrival. Neuro exam complete.    FSBG 82 per EMS.    BP (!) 171/79   Pulse 67   Temp 36.7 °C (98 °F) (Temporal)   Resp 20   Ht 1.803 m (5' 11\")   Wt 71.7 kg (158 lb)   SpO2 98%   BMI 22.04 kg/m²     "

## 2023-11-13 NOTE — ED NOTES
Medication history reviewed with pts [facility] and [facility] pharmacy. Med rec is complete.  Allergies reviewed, per pts wife    Pt not able to tell me what medications he is taking.  Pts wife is not sure what medications he is taking or when he took them last.  Called [facility] ([phone]) to verify all medications.      Called [facility] pharmacy @ [phone] to verify all medications, per [facility] pharmacy pt last filled any medications was on 12/2022.    Patient has not had any outpatient antibiotics in the last 30 days.    Pt is not on any anticoagulants

## 2023-11-13 NOTE — ED NOTES
Pt having Left sided weakness upper and lower extremities with mild aphasia and word finding.        Report to [facility] and transferred to [facility].

## 2023-11-14 ENCOUNTER — NON-PROVIDER VISIT (OUTPATIENT)
Dept: CARDIOLOGY | Facility: MEDICAL CENTER | Age: 83
End: 2023-11-14
Payer: MEDICARE

## 2023-11-14 ENCOUNTER — APPOINTMENT (OUTPATIENT)
Dept: RADIOLOGY | Facility: MEDICAL CENTER | Age: 83
DRG: 065 | End: 2023-11-14
Attending: INTERNAL MEDICINE
Payer: MEDICARE

## 2023-11-14 VITALS
RESPIRATION RATE: 14 BRPM | HEIGHT: 71 IN | TEMPERATURE: 97.3 F | SYSTOLIC BLOOD PRESSURE: 132 MMHG | OXYGEN SATURATION: 97 % | BODY MASS INDEX: 23.33 KG/M2 | HEART RATE: 79 BPM | WEIGHT: 166.67 LBS | DIASTOLIC BLOOD PRESSURE: 64 MMHG

## 2023-11-14 DIAGNOSIS — I49.3 VENTRICULAR ECTOPY: ICD-10-CM

## 2023-11-14 DIAGNOSIS — I47.10 SVT (SUPRAVENTRICULAR TACHYCARDIA) (HCC): ICD-10-CM

## 2023-11-14 DIAGNOSIS — I49.1 ATRIAL ECTOPY: ICD-10-CM

## 2023-11-14 PROBLEM — I63.9 ACUTE ISCHEMIC STROKE (HCC): Status: RESOLVED | Noted: 2023-11-13 | Resolved: 2023-11-14

## 2023-11-14 LAB
ANION GAP SERPL CALC-SCNC: 12 MMOL/L (ref 7–16)
BASOPHILS # BLD AUTO: 0.6 % (ref 0–1.8)
BASOPHILS # BLD: 0.03 K/UL (ref 0–0.12)
BUN SERPL-MCNC: 53 MG/DL (ref 8–22)
CALCIUM SERPL-MCNC: 8.2 MG/DL (ref 8.5–10.5)
CHLORIDE SERPL-SCNC: 113 MMOL/L (ref 96–112)
CHOLEST SERPL-MCNC: 96 MG/DL (ref 100–199)
CO2 SERPL-SCNC: 17 MMOL/L (ref 20–33)
CREAT SERPL-MCNC: 2.5 MG/DL (ref 0.5–1.4)
EOSINOPHIL # BLD AUTO: 0.12 K/UL (ref 0–0.51)
EOSINOPHIL NFR BLD: 2.5 % (ref 0–6.9)
ERYTHROCYTE [DISTWIDTH] IN BLOOD BY AUTOMATED COUNT: 47.8 FL (ref 35.9–50)
GFR SERPLBLD CREATININE-BSD FMLA CKD-EPI: 25 ML/MIN/1.73 M 2
GLUCOSE SERPL-MCNC: 104 MG/DL (ref 65–99)
HCT VFR BLD AUTO: 28 % (ref 42–52)
HDLC SERPL-MCNC: 42 MG/DL
HGB BLD-MCNC: 9.2 G/DL (ref 14–18)
IMM GRANULOCYTES # BLD AUTO: 0.03 K/UL (ref 0–0.11)
IMM GRANULOCYTES NFR BLD AUTO: 0.6 % (ref 0–0.9)
LDLC SERPL CALC-MCNC: 36 MG/DL
LYMPHOCYTES # BLD AUTO: 1.22 K/UL (ref 1–4.8)
LYMPHOCYTES NFR BLD: 25.6 % (ref 22–41)
MAGNESIUM SERPL-MCNC: 2.4 MG/DL (ref 1.5–2.5)
MCH RBC QN AUTO: 32.3 PG (ref 27–33)
MCHC RBC AUTO-ENTMCNC: 32.9 G/DL (ref 32.3–36.5)
MCV RBC AUTO: 98.2 FL (ref 81.4–97.8)
MONOCYTES # BLD AUTO: 0.44 K/UL (ref 0–0.85)
MONOCYTES NFR BLD AUTO: 9.2 % (ref 0–13.4)
NEUTROPHILS # BLD AUTO: 2.93 K/UL (ref 1.82–7.42)
NEUTROPHILS NFR BLD: 61.5 % (ref 44–72)
NRBC # BLD AUTO: 0 K/UL
NRBC BLD-RTO: 0 /100 WBC (ref 0–0.2)
PLATELET # BLD AUTO: 237 K/UL (ref 164–446)
PMV BLD AUTO: 9.9 FL (ref 9–12.9)
POTASSIUM SERPL-SCNC: 4.6 MMOL/L (ref 3.6–5.5)
RBC # BLD AUTO: 2.85 M/UL (ref 4.7–6.1)
SODIUM SERPL-SCNC: 142 MMOL/L (ref 135–145)
TRIGL SERPL-MCNC: 90 MG/DL (ref 0–149)
WBC # BLD AUTO: 4.8 K/UL (ref 4.8–10.8)

## 2023-11-14 PROCEDURE — 80061 LIPID PANEL: CPT

## 2023-11-14 PROCEDURE — 700111 HCHG RX REV CODE 636 W/ 250 OVERRIDE (IP): Performed by: INTERNAL MEDICINE

## 2023-11-14 PROCEDURE — 70551 MRI BRAIN STEM W/O DYE: CPT

## 2023-11-14 PROCEDURE — 97162 PT EVAL MOD COMPLEX 30 MIN: CPT

## 2023-11-14 PROCEDURE — A9270 NON-COVERED ITEM OR SERVICE: HCPCS | Performed by: INTERNAL MEDICINE

## 2023-11-14 PROCEDURE — 99232 SBSQ HOSP IP/OBS MODERATE 35: CPT | Performed by: PSYCHIATRY & NEUROLOGY

## 2023-11-14 PROCEDURE — 97166 OT EVAL MOD COMPLEX 45 MIN: CPT

## 2023-11-14 PROCEDURE — A9270 NON-COVERED ITEM OR SERVICE: HCPCS | Performed by: PSYCHIATRY & NEUROLOGY

## 2023-11-14 PROCEDURE — 700102 HCHG RX REV CODE 250 W/ 637 OVERRIDE(OP): Performed by: PSYCHIATRY & NEUROLOGY

## 2023-11-14 PROCEDURE — 700102 HCHG RX REV CODE 250 W/ 637 OVERRIDE(OP): Performed by: INTERNAL MEDICINE

## 2023-11-14 PROCEDURE — 92523 SPEECH SOUND LANG COMPREHEN: CPT

## 2023-11-14 PROCEDURE — 92610 EVALUATE SWALLOWING FUNCTION: CPT

## 2023-11-14 PROCEDURE — 83735 ASSAY OF MAGNESIUM: CPT

## 2023-11-14 PROCEDURE — 80048 BASIC METABOLIC PNL TOTAL CA: CPT

## 2023-11-14 PROCEDURE — 85025 COMPLETE CBC W/AUTO DIFF WBC: CPT

## 2023-11-14 PROCEDURE — 99239 HOSP IP/OBS DSCHRG MGMT >30: CPT | Performed by: HOSPITALIST

## 2023-11-14 RX ORDER — ENOXAPARIN SODIUM 100 MG/ML
30 INJECTION SUBCUTANEOUS DAILY
Status: DISCONTINUED | OUTPATIENT
Start: 2023-11-14 | End: 2023-11-14 | Stop reason: HOSPADM

## 2023-11-14 RX ORDER — LOSARTAN POTASSIUM 25 MG/1
25 TABLET ORAL DAILY
Status: DISCONTINUED | OUTPATIENT
Start: 2023-11-14 | End: 2023-11-14 | Stop reason: HOSPADM

## 2023-11-14 RX ORDER — ASPIRIN 81 MG/1
81 TABLET ORAL DAILY
Status: DISCONTINUED | OUTPATIENT
Start: 2023-11-14 | End: 2023-11-14 | Stop reason: HOSPADM

## 2023-11-14 RX ADMIN — HYDRALAZINE HYDROCHLORIDE 10 MG: 20 INJECTION, SOLUTION INTRAMUSCULAR; INTRAVENOUS at 09:07

## 2023-11-14 RX ADMIN — LOSARTAN POTASSIUM 25 MG: 25 TABLET, FILM COATED ORAL at 09:43

## 2023-11-14 RX ADMIN — ACETAMINOPHEN 650 MG: 325 TABLET, FILM COATED ORAL at 09:11

## 2023-11-14 RX ADMIN — ASPIRIN 81 MG: 81 TABLET, COATED ORAL at 12:13

## 2023-11-14 ASSESSMENT — COGNITIVE AND FUNCTIONAL STATUS - GENERAL
MOVING FROM LYING ON BACK TO SITTING ON SIDE OF FLAT BED: A LITTLE
MOVING TO AND FROM BED TO CHAIR: A LITTLE
SUGGESTED CMS G CODE MODIFIER MOBILITY: CJ
SUGGESTED CMS G CODE MODIFIER DAILY ACTIVITY: CH
MOBILITY SCORE: 20
CLIMB 3 TO 5 STEPS WITH RAILING: A LITTLE
TURNING FROM BACK TO SIDE WHILE IN FLAT BAD: A LITTLE
DAILY ACTIVITIY SCORE: 24

## 2023-11-14 ASSESSMENT — GAIT ASSESSMENTS
GAIT LEVEL OF ASSIST: SUPERVISED
DEVIATION: DECREASED BASE OF SUPPORT
DISTANCE (FEET): 300

## 2023-11-14 ASSESSMENT — ACTIVITIES OF DAILY LIVING (ADL): TOILETING: INDEPENDENT

## 2023-11-14 ASSESSMENT — PAIN DESCRIPTION - PAIN TYPE: TYPE: ACUTE PAIN

## 2023-11-14 NOTE — PROGRESS NOTES
Patient given discharge AVS. Belongings sent with patient including hearing aids and phone. Patient ambulated to car with RN and wife.

## 2023-11-14 NOTE — CARE PLAN
The patient is Watcher - Medium risk of patient condition declining or worsening    Shift Goals  Clinical Goals: stable neuro exam and vital signs checked per protocol, MRI  Patient Goals: rest  Family Goals: tawanda    Progress made toward(s) clinical / shift goals:    Problem: Optimal Care of the Stroke Patient  Goal: Optimal acute care for the stroke patient  Description: Target End Date:  1 to 3 days    - Vital signs and neuro checks performed and documented per order  - NIHSS completed and documented per order  - Continuous telemetry monitoring for 72 hours or until discontinued by provider  - Head CT without contrast obtained  - Consideration of MRI/MRA  - MRI screening form completed in worklist if MRI ordered  - Echocardiogram with Bubble Study ordered/completed with consideration of VIVIAN  - Carotid Doppler ordered/completed (Not required if CTA of neck completed in ED)  - Lipid Panel obtained within 48 hours of admission  - PT, PTT, INR obtained per Anticoagulation orders (if applicable)  - Antithrombotic therapy by end of hospital day 2 for ischemic stroke. Provider must document reason if contraindicated.  - Venous Thromboembolism (VTE) Prophylaxis by end of hospital day 2 for ischemic and hemorrhagic stroke. Provider must document reason if contraindicated  - Dysphagia screen completed and documented prior to any PO intake. Patient to remain NPO until Speech Therapy evaluation if thrombolytic or thrombectomy performed  - Rehabilitation assessment including PT/OT/SLP evaluations for referral to Physical Medicine and Rehabilitation services. If none needed, provider needs to document reason  - Neurology consult placed  - Consideration of cardiology consult for cryptogenic strokes  Outcome: Progressing     Problem: Knowledge Deficit - Stroke Education  Goal: Patient's knowledge of stroke and risk factors will improve  Description: Target End Date:  1-3 days or as soon as patient condition allows    Document in  Patient Education    1.  Stroke education booklet provided  2.  Education regarding EMS activation, need for follow up, medication prescribed at discharge, risk factors for stroke/lifestyle modifications, warning signs and symptoms of stroke provided  Outcome: Progressing     Problem: Discharge Planning - Stroke  Goal: Ensure Stroke Core Measures are met prior to discharge  Description: Target End Date:  Prior to discharge or change in level of care    1. Patient discharged on antithrombotic therapy (Ischemic Stroke)  2. Patient discharged on intensive statin if LDL is greater than or equal to 70 mg/dl (Ischemic Stroke)  3. Patient discharged on anticoagulation therapy for patients with atrial fibrillation/flutter (Ischemic Stroke)  4. Smoking education/cessation provided if applicable  Outcome: Progressing  Goal: Patient’s continuum of care needs will be met  Description: Target End Date:  Prior to discharge or change in level of care    1.  Potential discharge barriers identified upon admission and throughout hospital stay  2.  Ensure appropriate referrals in place for follow up with specialists after discharge  3.  Ensure appropriate referrals in place for DMEs if applicable  4.  Collaboration with transitional care team and interdisciplinary team to meet discharge needs  5.  Involve patient and family/caregiver in prioritizing goals for discharge planning  6.  Educate patient and caregiver about discharge instructions, medications, and follow up appointments  7.  Referral placed to [facility]  8.  Assure orders and follow up appointments are made for outpatient extended cardiac monitoring if appropriate  Outcome: Progressing     Problem: Neuro Status  Goal: Neuro status will remain stable or improve  Description: Target End Date:  Prior to discharge or change in level of care    Document on Neuro assessment in the Assessment flowsheet    1.  Assess and monitor neurologic status per provider  order/protocol/unit policy  2.  Assess level of consciousness and orientation  3.  Assess for speech, dysarthria, dysphagia, facial symmetry  4.  Assess visual field, eye movements, gaze preference, pupil reaction and size  5.  Assess muscle strength and motor response in all four extremities  6.  Assess for sensation (numbness and tingling)  7.  Assess basic neuro reflexes (cough, gag, corneal)  8.  Identify changes in neuro status and report to provider for testing/treatment orders  Outcome: Progressing     Problem: Hemodynamic Monitoring  Goal: Patient's hemodynamics, fluid balance and neurologic status will be stable or improve  Description: Target End Date:  Prior to discharge or change in level of care    1.  Vital signs, pulse oximetry and cardiac monitor per provider order and/or policy  2.  Frequent pulse checks performed post thrombectomy  3.  Frequent monitoring for signs of bleeding post TPA administration  4.  Proper management of IV infusions  5.  Intake and output monitored per provider order  6.  Daily weight obtained per unit policy or provider order  7.  Peripheral pulses and capillary refill assessed as needed  8.  Monitor for signs/symptoms of excessive bleeding  9.  Body temperature assessed and fevers treated  10. Patient positioned for maximum circulation/cardiac output  Outcome: Progressing     Problem: Urinary Elimination  Goal: Establish and maintain regular urinary output  Description: Target End Date:  Prior to discharge or change in level of care    Document on I/O and Assessment flowsheets    1.  Evaluate need to continue indwelling catheter every shift  2.  Assess signs and symptoms of urinary retention  3.  Assess post-void residual volumes  4.  Implement bladder training program  5.  Encourage scheduled voidings  6.  Assist patient to sit on bedside commode or toilet for voiding  7.  Educate patient and family/caregiver on use and purpose of urine collection devices (document in  Patient Education)  Outcome: Progressing     Problem: Knowledge Deficit - Standard  Goal: Patient and family/care givers will demonstrate understanding of plan of care, disease process/condition, diagnostic tests and medications  Description: Target End Date:  1-3 days or as soon as patient condition allows    Document in Patient Education    1.  Patient and family/caregiver oriented to unit, equipment, visitation policy and means for communicating concern  2.  Complete/review Learning Assessment  3.  Assess knowledge level of disease process/condition, treatment plan, diagnostic tests and medications  4.  Explain disease process/condition, treatment plan, diagnostic tests and medications  Outcome: Progressing       Patient is not progressing towards the following goals:

## 2023-11-14 NOTE — DISCHARGE PLANNING
[facility] Transitional Care Coordination    Referral from: Dr. Becerra    Insurance Provider on Facesheet: BOBO/VANNESSA    Potential Rehab Diagnosis: CVA    Chart review indicates patient may have on going medical management and may have therapy needs to possibly meet inpatient rehab facility criteria with the goal of returning to community.    D/C support will need to be verified: Spouse    Physiatry consultation pended per protocol.  W/U & TX pending.     Thank you for the referral.     [facility] is not requiring 2 of 3 disciplines.  TCC will no longer follow.  Please reach out to myself with any questions.

## 2023-11-14 NOTE — DISCHARGE SUMMARY
Discharge Summary    CHIEF COMPLAINT ON ADMISSION  Chief Complaint   Patient presents with    Possible Stroke     Patient is a transfer from [facility] for stroke.  LKW 1145.  Patient noted L side weakness and expressive aphasia.  Patient given TNK @ 1258  NIH 5 on arrival       Reason for Admission  Left sided weakness and difficulty word finding.    Admission Date  11/13/2023    CODE STATUS  Full Code    HPI & HOSPITAL COURSE  Joshua Almeida is an 83-year-old male with a history of hypertension, chronic kidney disease and [place] macroglobulinemia.  On 11/13 he had a sudden onset of dizziness and feeling unbalanced along with some difficult speech and left-sided arm weakness.  He was seen initially at [facility] where neurology was consulted and felt that he was a good candidate for TNKase and transferred to [facility] for specialty of care with neurology.  Initial CT of the head showed no head bleed.  Patient had acute resolve of his symptoms shortly after arriving to the hospital and after being given TNKase.  MRI of the brain showed no acute process with age-related volume loss and chronic microvascular ischemic changes.  Echocardiogram showed no septal defect nor the concern for acute cause of stroke.  Patient was set up with a Zio patch prior to discharge.  Recommendations were to continue with statin and aspirin therapy.    Labs of interest during admission showed ongoing anemia with a hemoglobin of 9.2 during hospitalization with concern of his renal disease as possible etiology.  Renal function during hospitalization showed an average of 2.49 creatinine.  Magnesium was significantly low on admission the magnesium level 1 on 11/13 and at time of discharge it was elevated 2.4.  He had minimally elevated glycohemoglobin 5.7 in the discussed with him the need to watch his sugars and increase his activities.  Troponin was mildly elevated at 25 with no complaint of chest pain or  shortness of breath.    Therefore, he is discharged in good and stable condition to home with close outpatient follow-up.    The patient met 2-midnight criteria for an inpatient stay at the time of discharge.    Discharge Date  11/14/2023    FOLLOW UP ITEMS POST DISCHARGE  None    DISCHARGE DIAGNOSES  Principal Problem (Resolved):    Acute ischemic stroke (HCC) (POA: Yes)  Active Problems:    Primary hypertension (POA: Yes)    Hyperlipidemia (POA: Yes)    CKD (chronic kidney disease) (POA: Yes)    [place] macroglobulinemia (HCC) (POA: Yes)    Macrocytic anemia (POA: Yes)      FOLLOW UP  Future Appointments   Date Time Provider Department Center   12/7/2023 10:00 AM Gordon Zaldivar M.D. [facility] Main [facility]     Aleta Mittal D.O.  [address]  [address]  [place] [address]  [phone]    Schedule an appointment as soon as possible for a visit in 1 week(s)      Mir Howard M.D.  [address]  [place] [address]  [phone]    Schedule an appointment as soon as possible for a visit in 2 week(s)  follow up on your kidney function.      MEDICATIONS ON DISCHARGE     Medication List        CONTINUE taking these medications        Instructions   aspirin 81 MG EC tablet   Take 81 mg by mouth every day.  Dose: 81 mg     chlorthalidone 50 MG Tabs  Commonly known as: [place]   Take 50 mg by mouth every day.  Dose: 50 mg     losartan 25 MG Tabs  Commonly known as: Cozaar   Take 25 mg by mouth every day.  Dose: 25 mg     MULTI-VITAMIN DAILY PO   Take 1 Tablet by mouth every day.  Dose: 1 Tablet     simvastatin 40 MG Tabs  Commonly known as: Zocor   Take 40 mg by mouth every morning.  Dose: 40 mg     Stelara 90 MG/ML Sosy injection  Generic drug: ustekinumab   Inject 90 mg under the skin Every 60 days.  Dose: 90 mg     Tylenol 8 Hour Arthritis Pain 650 MG CR tablet  Generic drug: acetaminophen   Take 650-1,300 mg by mouth every 6 hours as needed. Indications: Pain  Dose: 650-1,300 mg     VITAMIN B-12  SL   Place 1 Tab under tongue every day.  Dose: 1 Tablet     VITAMIN D PO   Take 1 Capsule by mouth every day.  Dose: 1 Capsule              Allergies  No Known Allergies    DIET  Orders Placed This Encounter   Procedures    Diet Order Diet: Renal     Standing Status:   Standing     Number of Occurrences:   1     Order Specific Question:   Diet:     Answer:   Renal [8]       ACTIVITY  As tolerated.  Weight bearing as tolerated    CONSULTATIONS  Neurology Dr Rafal Pelletier    PROCEDURES  none    LABORATORY  Lab Results   Component Value Date    SODIUM 142 11/14/2023    POTASSIUM 4.6 11/14/2023    CHLORIDE 113 (H) 11/14/2023    CO2 17 (L) 11/14/2023    GLUCOSE 104 (H) 11/14/2023    BUN 53 (H) 11/14/2023    CREATININE 2.50 (H) 11/14/2023        Lab Results   Component Value Date    WBC 4.8 11/14/2023    HEMOGLOBIN 9.2 (L) 11/14/2023    HEMATOCRIT 28.0 (L) 11/14/2023    PLATELETCT 237 11/14/2023      MR-BRAIN-W/O   Final Result         No acute process.      Age-related volume loss and chronic microvascular ischemic changes.      EC-ECHOCARDIOGRAM COMPLETE W/O CONT   Final Result      11/13/2023 Echo CONCLUSIONS  Compared to the images of the prior study 5/18/2023, there has been   progression of aortic stenosis, previously mild.     Normal left ventricular systolic function.  The left ventricular ejection fraction is visually estimated to be 70%.  Normal diastolic function.  The right ventricle is normal in size and systolic function.  Moderate aortic valve stenosis.  Mild aortic insufficiency.      Total time of the discharge process exceeds 32 minutes.

## 2023-11-14 NOTE — CARE PLAN
The patient is Watcher - Medium risk of patient condition declining or worsening        Problem: Knowledge Deficit - Stroke Education  Goal: Patient's knowledge of stroke and risk factors will improve  Outcome: Progressing     Problem: Neuro Status  Goal: Neuro status will remain stable or improve  Outcome: Progressing  Note: Patient neuro exam improved since receiving TNK.      Problem: Knowledge Deficit - Standard  Goal: Patient and family/care givers will demonstrate understanding of plan of care, disease process/condition, diagnostic tests and medications  Outcome: Progressing     Problem: Skin Integrity  Goal: Skin integrity is maintained or improved  Outcome: Progressing

## 2023-11-14 NOTE — PROGRESS NOTES
Stroke bridge called for follow up appointment, referral received. Stroke bridge stated they would call patient in a few day when referral has been approved to schedule appointment with the patient.

## 2023-11-14 NOTE — THERAPY
"Physical Therapy   Initial Evaluation     Patient Name: Joshua Cross  Age:  83 y.o., Sex:  male  Medical Record #: [id number]  Today's Date: 11/14/2023          Assessment  Patient is 83 y.o. male admitted with expressive aphasia and dysequilibrium. PMH includes HTN. Pt is s/p TNK with improvement of symptoms. MRI negative for acute infarct. Pt was able to demonstrate mobility at his baseline but reported feeling \"weird\" likely for being in bed for a day. No further acute PT needs.     Plan    Physical Therapy Initial Treatment Plan   Duration: Evaluation only    DC Equipment Recommendations: None  Discharge Recommendations: Anticipate that the patient will have no further physical therapy needs after discharge from the hospital        11/14/23 1010   Pain 0 - 10 Group   Therapist Pain Assessment During Activity;0   Prior Living Situation   Prior Services Home-Independent   Housing / Facility 1 Story House   Steps Into Home 1   Steps In Home 0   Equipment Owned None   Lives with - Patient's Self Care Capacity Spouse   Comments wife is currently hurt   Prior Level of Functional Mobility   Bed Mobility Independent   Transfer Status Independent   Ambulation Independent   Ambulation Distance community   Assistive Devices Used None   Stairs Independent   Comments active   History of Falls   History of Falls No   Cognition    Level of Consciousness Alert   Comments cooperative   Active ROM Lower Body    Active ROM Lower Body  WDL   Strength Lower Body   Lower Body Strength  WDL   Sensation Lower Body   Lower Extremity Sensation   WDL   Coordination Lower Body    Coordination Lower Body  WDL   Balance Assessment   Sitting Balance (Static) Fair +   Sitting Balance (Dynamic) Fair +   Standing Balance (Static) Fair +   Standing Balance (Dynamic) Fair   Weight Shift Sitting Good   Weight Shift Standing Good   Comments no AD   Bed Mobility    Supine to Sit Supervised   Scooting Supervised   Rolling Supervised   Comments in " "chair   Gait Analysis   Gait Level Of Assist Supervised   Assistive Device None   Distance (Feet) 300   # of Times Distance was Traveled 1   Deviation Decreased Base Of Support   Weight Bearing Status no restrictions   Comments pt reporting feeling \"weird\"   Functional Mobility   Sit to Stand Supervised   Bed, Chair, Wheelchair Transfer Supervised   Transfer Method Stand Step   Mobility in room and hallway with no AD   Education Group   Education Provided Role of Physical Therapist   Role of Physical Therapist Patient Response Patient;Acceptance;Demonstration;Action Demonstration   Physical Therapy Initial Treatment Plan    Duration Evaluation only   Anticipated Discharge Equipment and Recommendations   DC Equipment Recommendations None   Discharge Recommendations Anticipate that the patient will have no further physical therapy needs after discharge from the hospital       "

## 2023-11-14 NOTE — THERAPY
"Occupational Therapy   Initial Evaluation     Patient Name: Joshua Cross  Age:  83 y.o., Sex:  male  Medical Record #: [id number]  Today's Date: 11/14/2023          Assessment    Patient is 83 y.o. male admitted for transient neurologic symptoms, HTN, HLD possible TIA/ CVA s/p TNK. Pt normally independent with functional mobility and ADLs living in a SLH with spouse. Pt able to complete all functional mobility and ADLs with supervision, no AD required. Pts BUE strength/sensation/coordination all WFL assessed via functional tasks. Anticipate pt is at his functional baseline, no acute OT needs at this time.       Plan    DC Equipment Recommendations: (P) None  Discharge Recommendations: (P) Anticipate that the patient will have no further occupational therapy needs after discharge from the hospital     Subjective    \"I'm glad I came in\"     Objective       11/14/23 1014   Prior Living Situation   Prior Services None   Housing / Facility 1 Story House   Steps Into Home 1   Bathroom Set up Walk In Shower   Equipment Owned None   Lives with - Patient's Self Care Capacity Spouse   Prior Level of ADL Function   Self Feeding Independent   Grooming / Hygiene Independent   Bathing Independent   Dressing Independent   Toileting Independent   Prior Level of IADL Function   Medication Management Independent   Laundry Independent   Kitchen Mobility Independent   Finances Independent   Home Management Independent   Shopping Independent   Prior Level Of Mobility Independent Without Device in Community   Driving / Transportation Driving Independent   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)   History of Falls   History of Falls No   Pain 0 - 10 Group   Therapist Pain Assessment Nurse Notified;Post Activity Pain Same as Prior to Activity   Cognition    Cognition / Consciousness WDL   Level of Consciousness Alert   Comments Very pleasant, cooperative, receptive to therapy   Active ROM Upper Body "   Active ROM Upper Body  WDL   Dominant Hand Right   Strength Upper Body   Upper Body Strength  WDL   Sensation Upper Body   Upper Extremity Sensation  WDL   Upper Body Muscle Tone   Upper Body Muscle Tone  WDL   Neurological Concerns   Neurological Concerns No   Coordination Upper Body   Coordination WDL   Balance Assessment   Sitting Balance (Static) Good   Sitting Balance (Dynamic) Good   Standing Balance (Static) Good   Standing Balance (Dynamic) Good   Weight Shift Sitting Good   Weight Shift Standing Good   Comments no AD   Bed Mobility    Supine to Sit Supervised   Scooting Supervised   Rolling Supervised   ADL Assessment   Grooming Supervision;Standing   Upper Body Dressing Supervision   Lower Body Dressing Supervision   Toileting Supervision   How much help from another person does the patient currently need...   Putting on and taking off regular lower body clothing? 4   Bathing (including washing, rinsing, and drying)? 4   Toileting, which includes using a toilet, bedpan, or urinal? 4   Putting on and taking off regular upper body clothing? 4   Taking care of personal grooming such as brushing teeth? 4   Eating meals? 4   6 Clicks Daily Activity Score 24   mRS Prior to admission   Prior to admission mRS 0   Modified [place] (mRS)   Modified Kam Score 0   Functional Mobility   Sit to Stand Supervised   Bed, Chair, Wheelchair Transfer Supervised   Toilet Transfers Supervised   Transfer Method Stand Step   Mobility bed mobility, hallway, bathroom, up to sink, left in chair   Comments no AD   Visual Perception   Visual Perception  WDL   Activity Tolerance   Sitting in Chair left in chair   Standing 15 min   Education Group   Education Provided Role of Occupational Therapist   Role of Occupational Therapist Patient Response Patient;Acceptance;Explanation   Problem List   Problem List None   Anticipated Discharge Equipment and Recommendations   DC Equipment Recommendations None   Discharge Recommendations  Anticipate that the patient will have no further occupational therapy needs after discharge from the hospital   Interdisciplinary Plan of Care Collaboration   IDT Collaboration with  Nursing;Physical Therapist   Patient Position at End of Therapy Seated;Call Light within Reach;Tray Table within Reach;Phone within Reach   Collaboration Comments RN updated

## 2023-11-14 NOTE — THERAPY
"Speech Language Pathology   Communication Evaluation     Patient Name: Joshua Cross  AGE:  83 y.o., SEX:  male  Medical Record #: [id number]  Date of Service: 11/14/2023      History of Present Illness  84 y/o M admit 11/13 w/ acute cognitive disturbances (\"foggy\" thinking), difficulty with word finding, and imbalance. Head CT was negative. Pt is s/p TNK care. MRI pending.      PMHx: HTN, HLD    General Information  Vitals  O2 Delivery Device: None - Room Air  Level of Consciousness: Alert, Awake     Orientation: Oriented x 4  Follows Directives: Yes    Prior Living Situation & Level of Function  Prior Services: None  Housing / Facility: Unable To Determine At This Time  Lives with - Patient's Self Care Capacity: Alone and Able to Care For Self, Spouse  Education: Completed College  Communication: WNL  Swallowing: WNL     Oral Mechanism Evaluation  Dentition: Good, Natural dentition  Facial Symmetry: Equal  Facial Sensation: Equal  Labial Observations: WFL  Lingual Observations: Midline  Motor Speech: WNL    Laryngeal Function Exam  Secretion Management: Adequate  Voice Quality: WFL  Cough: Perceptually WNL    Subjective   (Pt seen A&Ox4, pleasant & cooperative for exam. He reported a hearing loss and had his hearing aids, but they were dead. He indicated that he could hear the therapist well at an elevated loudness level for the exam.)    Communication Domain(s)  Expressive Language: WFL  Receptive Language: WFL  Cognitive-Linguistic: WFL  Reading:  (Not assessed on this date, not indicated- pt appearing to be at baseline)  Social/Pragmatic: WFL    Assessment  The patient was seen this date for a Speech-Language/Cognitive evaluation. Of note, the pt has hearing loss and uses hearing aids, which were not working on this date. SLP provided questions at an elevated loudness level and the pt indicated that is accommodated for his hearing loss adequately for the purposes of the evaluation. "     Cognistat  Orientation: Average  Attention: Average  Comprehension: Average  Repetition: Average  Naming: Average  Memory: Average  Calculations: Average  Similarities: Average  Judgement: Average    Clinical Impressions  Pt demo'ing speech, language and cognitive-linguistic skills at baseline and functional to return home independently and continue completing iADLs independently. No further ST services are indicated at this time.     NOTE: It is not within the scope of practice of Speech-Language Pathologists to determine patient capacity. Please defer to the physician or psych to complete this assessment.     Recommendations  Supervision Needs Upons Discharge: None  IADLs: N/A     SLP Treatment Plan  Treatment Plan: None Indicated  SLP Frequency: N/A - Evaluation Only  Estimated Duration: N/A - Evaluation Only    Anticipated Discharge Needs  Discharge Recommendations: Anticipate that the patient will have no further speech therapy needs after discharge from the hospital     NAKITA Combs

## 2023-11-14 NOTE — DISCHARGE PLANNING
Case Management Discharge Planning    Admission Date: 11/13/2023  GMLOS: 2.9  ALOS: 1    6-Clicks ADL Score: 24  6-Clicks Mobility Score: 21      Anticipated Discharge Disposition: Discharged to home/self care (01)  Discharge Address: [facility] [place] [address]  Discharge Contact Phone Number: [phone]    DME Needed: No    Action(s) Taken: Chart reviewed & case discussed in ICU rounds:  Patient admitted for acute ischemic stroke; MRI brain negative for acute process.  Anticipating DC Home/Self Care today.    CM met at bedside with patient to complete assessment and discuss DCP.   Patient, AAOx4 at time of interview, reported residing in single level house with his wife, Liz, who will provide return transportation to home.   Prior to admission, patient was independent with ADLs and ambulating w/o assistive devices.   He denies any DME use.   Patient is retired.   No Hx of ETOH or drug abuse.    OT/ST anticipate no further therapy needs.   No CM DC needs identified.    Escalations Completed: None    Medically Clear: No    Next Steps: CM remains available for assistance with DCP.    Barriers to Discharge: Medical clearance    Is the patient up for discharge tomorrow:  Potential DC today      Care Transition Team Assessment    Information Source  Orientation Level: Oriented X4  Information Given By: Patient  Who is responsible for making decisions for patient? : Patient    Readmission Evaluation  Is this a readmission?: No    Elopement Risk  Legal Hold: No  Ambulatory or Self Mobile in Wheelchair: No-Not an Elopement Risk  Elopement Risk: Not at Risk for Elopement    Interdisciplinary Discharge Planning  Lives with - Patient's Self Care Capacity: Spouse  Patient or legal guardian wants to designate a caregiver: No  Support Systems: Family Member(s), Children  Housing / Facility: [facility]  Prior Services: None  Durable Medical Equipment: Not Applicable    Discharge Preparedness  What is your plan  after discharge?: Home with help  What are your discharge supports?: Spouse  Prior Functional Level: Ambulatory, Independent with Activities of Daily Living, Drives Self    Functional Assesment  Prior Functional Level: Ambulatory, Independent with Activities of Daily Living, Drives Self    Finances  Financial Barriers to Discharge: No  Prescription Coverage: Yes    Vision / Hearing Impairment  Vision Impairment : No  Hearing Impairment : Yes  Hearing Impairment: Both Ears, Hearing Device(s) Available  Does Pt Need Special Equipment for the Hearing Impaired?: No     Advance Directive  Advance Directive?: DPOA for Health Care  Durable Power of  Name and Contact : Taya Cross ([place]) - Wife #[phone]    Domestic Abuse  Have you ever been the victim of abuse or violence?: No  Physical Abuse or Sexual Abuse: No  Verbal Abuse or Emotional Abuse: No  Possible Abuse/Neglect Reported to:: Not Applicable    Psychological Assessment  History of Substance Abuse: None (drinks 2 glasses of wine daily)  History of Psychiatric Problems: No  Non-compliant with Treatment: No  Newly Diagnosed Illness: Yes    Discharge Risks or Barriers  Discharge risks or barriers?: Complex medical needs  Patient risk factors: Complex medical needs    Anticipated Discharge Information  Discharge Disposition: Discharged to home/self care (01)  Discharge Address: [address] [place] [id number]  Discharge Contact Phone Number: [phone]

## 2023-11-14 NOTE — PROGRESS NOTES
Neurology Progress Note  Neurohospitalist Service, [facility] Neurosciences    Referring Physician: Aric Mckeon      Interval History: No acute events overnight.  Feels at neurologic baseline.  MRI brain completed with no evidence of acute infarct.     Review of systems: In addition to what is detailed in the HPI and/or updated in the interval history, all other systems reviewed and are negative.    Past Medical History, Past Surgical History and Social History reviewed and unchanged from prior    Medications:    Current Facility-Administered Medications:     aspirin EC tablet 81 mg, 81 mg, Oral, DAILY, Rafal Pelletier M.D.    enoxaparin (Lovenox) inj 40 mg, 40 mg, Subcutaneous, DAILY AT 1800, Rafal Pelletier M.D.    labetalol (Normodyne/Trandate) injection 10 mg, 10 mg, Intravenous, Q10 MIN PRN, Sadie Becerra M.D.    hydrALAZINE (Apresoline) injection 10 mg, 10 mg, Intravenous, Q2HRS PRN, Sadie Becerra M.D., 10 mg at 11/13/23 1500    niCARdipine (Cardene) 25 mg in  mL Standard Infusion, 0-15 mg/hr, Intravenous, Continuous, Sadie Becerra M.D., Held at 11/13/23 1649    atorvastatin (Lipitor) tablet 40 mg, 40 mg, Oral, Q EVENING, Sadie Becerra M.D.    senna-docusate (Pericolace Or Senokot S) 8.6-50 MG per tablet 2 Tablet, 2 Tablet, Oral, BID **AND** polyethylene glycol/lytes (Miralax) PACKET 1 Packet, 1 Packet, Oral, QDAY PRN **AND** magnesium hydroxide (Milk Of Magnesia) suspension 30 mL, 30 mL, Oral, QDAY PRN **AND** bisacodyl (Dulcolax) suppository 10 mg, 10 mg, Rectal, QDAY PRN, Sadie Becerra M.D.    acetaminophen (Tylenol) tablet 650 mg, 650 mg, Oral, Q6HRS PRN, Sadie Becerra M.D.    ondansetron (Zofran) syringe/vial injection 4 mg, 4 mg, Intravenous, Q4HRS PRN, Sadie Becerra M.D.    ondansetron (Zofran ODT) dispertab 4 mg, 4 mg, Oral, Q4HRS PRN, Sadie Becerra M.D.    Physical Examination:   BP (!) 150/70   Pulse 63   Temp 36.2 °C (97.1 °F) (Temporal)   Resp 18   Ht  "1.803 m (5' 10.98\")   Wt 75.6 kg (166 lb 10.7 oz)   SpO2 94%   BMI 23.26 kg/m²       General: Patient is awake and in no acute distress  Neck: There is normal range of motion  CV: Regular rate   Extremities:  Warm, dry, and intact, without peripheral lower extremity edema    NEUROLOGICAL EXAM:     Mental status: Awake, alert and fully oriented, follows commands  Speech and language: Speech is clear and fluent. The patient is able to name and repeat.  Cranial nerve exam:Visual fields are full. There is no nystagmus. Extraocular muscles are intact. Face is symmetric.   Motor exam: There is sustained antigravity with no downward drift in bilateral arms and legs.  There is no pronator drift .  Tone is normal. No abnormal movements were seen on exam.  Sensory exam:  Reacts to tactile in all 4 extremities, there is no neglect to double stim  Coordination: No ataxia on bilateral FTN testing      NIHSS: National Institutes of Health Stroke Scale    [0] 1a:Level of Consciousness    0-alert 1-drowsy   2-stupor   3-coma  [0] 1b:LOC Questions                  0-both  1-one      2-neither  [0] 1c:LOC Commands                   0-both  1-one      2-neither  [0] 2: Best Gaze                     0-nl    1-partial  2-forced  [0] 3: Visual Fields                   0-nl    1-partial  2-complete 3-bilat  [0] 4: Facial Paresis                0-nl    1-minor    2-partial  3-full  MOTOR                       0-nl  [0] 5: Right Arm           1-drift  [0] 6: Left Arm             2-some effort vs gravity  [0] 7: Right Leg           3-no effort vs gravity  [0] 8: Left Leg             4-no movement                             x-untestable  [0] 9: Limb Ataxia                    0-abs   1-1_limb   2-2+_limbs       x-untestable  [0] 10:Sensory                        0-nl    1-partial  2-dense  [0] 11:Best Language/Aphasia         0-nl    1-mild/mod 2-severe   3-mute  [0] 12:Dysarthria                     0-nl    1-mild/mod " 2-severe       x-untestable  [0] 13:Neglect/Inattention            0-none  1-partial  2-complete  [0] TOTAL      Objective Data:    Labs:  Lab Results   Component Value Date/Time    PROTHROMBTM 14.2 11/13/2023 12:14 PM    INR 1.04 11/13/2023 12:14 PM      Lab Results   Component Value Date/Time    WBC 4.8 11/14/2023 04:00 AM    RBC 2.85 (L) 11/14/2023 04:00 AM    HEMOGLOBIN 9.2 (L) 11/14/2023 04:00 AM    HEMATOCRIT 28.0 (L) 11/14/2023 04:00 AM    MCV 98.2 (H) 11/14/2023 04:00 AM    MCH 32.3 11/14/2023 04:00 AM    MCHC 32.9 11/14/2023 04:00 AM    MPV 9.9 11/14/2023 04:00 AM    NEUTSPOLYS 61.50 11/14/2023 04:00 AM    LYMPHOCYTES 25.60 11/14/2023 04:00 AM    MONOCYTES 9.20 11/14/2023 04:00 AM    EOSINOPHILS 2.50 11/14/2023 04:00 AM    BASOPHILS 0.60 11/14/2023 04:00 AM      Lab Results   Component Value Date/Time    SODIUM 142 11/14/2023 04:00 AM    POTASSIUM 4.6 11/14/2023 04:00 AM    CHLORIDE 113 (H) 11/14/2023 04:00 AM    CO2 17 (L) 11/14/2023 04:00 AM    GLUCOSE 104 (H) 11/14/2023 04:00 AM    BUN 53 (H) 11/14/2023 04:00 AM    CREATININE 2.50 (H) 11/14/2023 04:00 AM      Lab Results   Component Value Date/Time    CHOLSTRLTOT 96 (L) 11/14/2023 04:00 AM    LDL 36 11/14/2023 04:00 AM    HDL 42 11/14/2023 04:00 AM    TRIGLYCERIDE 90 11/14/2023 04:00 AM       Lab Results   Component Value Date/Time    ALKPHOSPHAT 75 11/13/2023 12:14 PM    ASTSGOT 15 11/13/2023 12:14 PM    ALTSGPT 14 11/13/2023 12:14 PM    TBILIRUBIN 0.2 11/13/2023 12:14 PM        Imaging/Testing:    I interpreted and/or reviewed the patient's neuroimaging    EC-ECHOCARDIOGRAM COMPLETE W/O CONT   Final Result      MR-BRAIN-W/O    (Results Pending)       Assessment and Plan:  Joshua Cross is a 83 year old man with hypertension, hyperlipidemia, presenting with transient expressive aphasia, dysequilibrium.  He is now s/p IV-TNK on 11/13 with subjective improvement in symptoms.  CTA without evidence of LVO amenable to endovascular therapy, or critical  stenoses requiring surgical revascularization.  MRI brain negative for acute infarct.  Unclear if aborted stroke or alternative etiology.  Will treat as TIA/minor stroke, and complete embolic workup with ECHO (completed), ziopatch monitoring.  Will re-initiate ASA, statin for long-term stroke prevention after 24 hours.     Problem list:  Transient neurologic symptoms  Hypertension  Hyperlipidemia     Plan:              - neurochecks/NIHSS per post-TNK protocol, then q4h neurochecks/NIHSS              - may start PO anti-HTN and titrate to long-term goal 110-130/60-80              - repeat head CT only if there is clinical deterioration              - stroke labs:  HgbA1c 5.7 and LDL 37              - resume home simvastatin 40mg daily for goal LDL < 70              - TTE without obvious embolic nidus, ziopatch at discharge              - no antithrombotic therapy, SCDs only x 24 hours, restarting ASA 81mg daily and lovenox SQ for DVT chemoppx at 24 hours post-TNK (orders placed)               - PT/OT/SLP ok even within first 24 hours              - stroke bridge clinic follow up    The evaluation of the patient, and recommended management, was discussed with Dr. Mckeon, ICU attending. I have performed a physical exam and reviewed and updated ROS and Plan today (11/14/2023).     Rafal Pelletier MD  Neurohospitalist, Acute Care Services

## 2023-11-14 NOTE — DISCHARGE INSTRUCTIONS
"  Discharge Instructions per Joshua Juan D.O.      DIET: Healthy balanced diet    ACTIVITY: as tolerates    DIAGNOSIS: Trans ischemic attack \"mini stroke\"    Return to ER if any need    Transient Ischemic Attack  A transient ischemic attack (TIA) causes the same symptoms as a stroke, but the symptoms go away quickly. A TIA happens when blood flow to the brain is blocked. Having a TIA means you may be at risk for a stroke. A TIA is a medical emergency.  What are the causes?  A TIA is caused by a blocked artery in the head or neck. This means the brain does not get the blood supply it needs. A blockage can be caused by:  Fatty buildup in an artery in the head or neck.  A blood clot.  A tear in an artery.  Irritation and swelling (inflammation) of an artery.  Sometimes the cause is not known.  What increases the risk?  Certain things may make you more likely to have a TIA. Some of these are things that you can change, such as:  Being very overweight.  Using products that have nicotine or tobacco.  Taking birth control pills.  Not being active.  Drinking too much alcohol.  Using drugs.  Health conditions that may increase your risk include:  High blood pressure.  High cholesterol.  Diabetes.  Heart disease.  A heartbeat that is not regular (atrial fibrillation).  Sickle cell disease.  Sleep problems (sleep apnea).  Long-term diseases that cause irritation and swelling.  Problems with blood clotting.  Other risk factors include:  Being over the age of 60.  Being male.  Having a family history of stroke.  Having had blood clots, stroke, TIA, or heart attack in the past.  Having a history of high blood pressure when pregnant (preeclampsia).  Very bad headaches (migraines).  What are the signs or symptoms?  The symptoms of a TIA are like those of a stroke. They can include:  Weakness or loss of feeling in your face, arm, or leg. This often happens on one side of your body.  Trouble walking.  Trouble moving your arms or " legs.  Trouble talking or understanding what people are saying.  Problems with how you see.  Feeling dizzy.  Feeling confused.  Loss of balance or coordination.  Feeling like you may vomit (nausea) or you vomit.  Having a very bad headache.  If you can, note what time you started to have symptoms. Tell your doctor.  How is this treated?  The goal of treatment is to lower the risk for a stroke. This may include:  Changes to diet and lifestyle, such as getting regular exercise and stopping smoking.  Taking medicines to:  Thin the blood.  Lower blood pressure.  Lower cholesterol.  Treating other health conditions, such as diabetes.  If testing shows that an artery in your brain is narrow, your doctor may recommend a procedure to:  Take the blockage out of your artery (carotid endarterectomy).  Open or widen an artery in your neck (carotid angioplasty and stenting).  Follow these instructions at home:  Medicines  Take over-the-counter and prescription medicines only as told by your doctor.  If you were told to take aspirin or another medicine to thin your blood, take it exactly as told by your doctor.  Taking too much of the medicine can cause bleeding.  Taking too little of the medicine may not work to treat the problem.  Eating and drinking    Eat 5 or more servings of fruits and vegetables each day.  Follow instructions from your doctor about your diet. You may need to follow a certain diet to help lower your risk of a stroke. You may need to:  Eat a diet that is low in fat and salt.  Eat foods with a lot of fiber.  Limit carbohydrates and sugar.  If you drink alcohol:  Limit how much you have to:  0-1 drink a day for women who are not pregnant.  0-2 drinks a day for men.  Know how much alcohol is in a drink. In the [place] one drink equals one 12 oz bottle of beer (355mL), one 5 oz glass of wine (148mL), or one 1½ oz glass of hard liquor (44mL).  General instructions  Keep a healthy weight.  Try to get at least 30  "minutes of exercise on most days.  Get treatment if you have sleep problems.  Do not smoke or use any products that contain nicotine or tobacco. If you need help quitting, ask your doctor.  Do not use drugs.  Keep all follow-up visits.  Where to find more information  American Stroke Association: www.stroke.org  Get help right away if:  You have chest pain.  You have a heartbeat that is not regular.  You have any signs of a stroke. \"BE FAST\" is an easy way to remember the main warning signs:  B - Balance. Dizziness, sudden trouble walking, or loss of balance.  E - Eyes. Trouble seeing or a change in how you see.  F - Face. Sudden weakness or loss of feeling of the face. The face or eyelid may droop on one side.  A - Arms. Weakness or loss of feeling in an arm. This happens all of a sudden and most often on one side of the body.  S - Speech. Sudden trouble speaking, slurred speech, or trouble understanding what people say.  T - Time. Time to call emergency services. Write down what time symptoms started.  You have other signs of a stroke, such as:  A sudden, very bad headache with no known cause.  Feeling like you may vomit.  Vomiting.  A seizure.  These symptoms may be an emergency. Get help right away. Call your local emergency services (911 in the [place]  Do not wait to see if the symptoms will go away.  Do not drive yourself to the hospital.  Summary  A transient ischemic attack (TIA) happens when an artery in the head or neck is blocked. This causes the same symptoms as a stroke. The symptoms go away quickly.  A TIA is a medical emergency. Get help right away, even if your symptoms go away.  Having a TIA means that you may be at risk for a stroke. Taking medicines and making diet and lifestyle changes can help to prevent a stroke.  This information is not intended to replace advice given to you by your health care provider. Make sure you discuss any questions you have with your health care provider.  Document " "Revised: 04/19/2023 Document Reviewed: 07/13/2021  [employer] Patient Education © 2023 [employer]      Transient Ischemic Attack (TIA)  Discharge Instructions    You experienced a Transient Ischemic Attack.  If an artery that supplies brain tissue is blocked for a short time, the blood flow to that area of the brain slows down or stops, which may cause temporary or transient symptoms of a stroke. A TIA is a serious warning sign that you could have a stroke.      Thrombolytic Treatment for Ischemic Stroke    You received thrombolytic treatment for an Ischemic Stroke. Thrombolytics are medicines that can break up blood clots. These medicines help to treat a stroke when given as soon as possible after stroke symptoms start.  The medicine was given to you through an IV tube.  In some cases, the medicine may go to the affected area through a thin tube (catheter). This tube is usually put in at the top of your leg.    Get help right away if:  You have blood in your vomit, pee, or poop.  You have a serious fall or accident, or you hit your head.  You have symptoms of an allergy to the medicine, such as a rash or trouble breathing.  You have other signs of a stroke, such as:  A sudden, very bad headache with no known cause.  Feeling like you may vomit (nausea).  Vomiting.  A seizure    Stroke Risk Factors    You are at increased risk of having another stroke event.  See your Patient Stroke Guide to help reduce your stroke risk. These are your specific risk factors:  Age - Over 55  Gender - Men are at a higher risk than women  Previous TIAs or \"mini strokes\"     Get help right away if you have any signs of a stroke.  \"BE FAST\" is an easy way to remember the main warning signs of a stroke:  B - Balance. Dizziness, sudden trouble walking, or loss of balance.  E - Eyes. Trouble seeing or a change in how you see.  F - Face. Sudden weakness or loss of feeling in the face. The face or eyelid may droop on one side.  A - Arms. " Weakness or loss of feeling in an arm. This happens all of a sudden and most often on one side of the body.  S - Speech. Sudden trouble speaking, slurred speech, or trouble understanding what people say.  T - Time. Time to call emergency services. Write down what time symptoms started.

## 2023-11-14 NOTE — THERAPY
"Speech Language Pathology   Clinical Swallow Evaluation     Patient Name: Joshua Cross  AGE:  83 y.o., SEX:  male  Medical Record #: [id number]  Date of Service: 11/14/2023      History of Present Illness  84 y/o M admit 11/13 w/ acute cognitive disturbances (\"foggy\" thinking), difficulty with word finding, and imbalance. Head CT was negative. Pt is s/p TNK care. MRI pending.     PMHx: HTN, HLD    General Information:  Vitals  O2 Delivery Device: None - Room Air  Level of Consciousness: Alert, Awake     Orientation: Oriented x 4  Follows Directives: Yes    Prior Living Situation & Level of Function:  Prior Services: None  Lives with - Patient's Self Care Capacity:  (Lives w/ spouse, independent for ADLs)  Education: Completed College  Communication: WNL  Swallowing: WNL     Oral Mechanism Evaluation:  Dentition: Good, Natural dentition   Facial Symmetry: Equal  Facial Sensation: Equal     Labial Observations: WFL   Lingual Observations: Midline  Motor Speech: WNL       Laryngeal Function:  Secretion Management: Adequate  Voice Quality: WFL     Cough: Perceptually WNL     Subjective   (Pt seen A&Ox4, pleasant & cooperative for exam. He reported a hearing loss and had his hearing aids, but they were dead. He indicated that he could hear the therapist well at an elevated loudness level for the exam.)    Assessment  Current Method of Nutrition: NPO until cleared by speech pathology  Positioning: Dai's (60-90 degrees)  Bolus Administration: Patient    O2 Delivery Device: None - Room Air  Factor(s) Affecting Performance: None     Swallowing Trials:  Swallowing Trials  Ice: Impaired (1x episode of prolonged, strong immediate coughing on a piece of ice. Suspect this was not due to an underlying chronic swallowing impairment. No other overt s/s of aspiration were noted.)  Thin Liquid (TN0): WFL (4 oz water)  Pureed (PU4): WFL (3 oz applesauce)  Regular (RG7): WFL (4 oz cracker)    Comments: Pt self-fed w/ appropriate " rate & volume control. Oral stage appeared WNL w/ no oral residue. Pt had one episode of immediate coughing with, reportedly, a small piece of ice in his water that he was not aware of. Noted a prompt, robust cough response. No other coughing, upper airway wetness or overt s/s of aspiration/dysphagia were noted.     Clinical Impressions  Clinical signs of oropharyngeal swallowing functional for a regular diet with thin/all liquids w/ general swallow precautions. ST to f/u for a Speech-Language/Cognitive Eval.     Recommendations  Diet Consistency: Thin/all Liquids, Regular Diet  Instrumentation: None indicated at this time  Medication: As tolerated     Positioning: Fully upright and midline during oral intake  Risk Management : None     SLP Treatment Plan  Treatment Plan: None Indicated  SLP Frequency: N/A - Evaluation Only  Estimated Duration: N/A - Evaluation Only    Anticipated Discharge Needs  Discharge Recommendations: Anticipate that the patient will have no further speech therapy needs after discharge from the hospital      Rica Gonzales, SLP

## 2023-11-27 ENCOUNTER — TELEPHONE (OUTPATIENT)
Dept: CARDIOLOGY | Facility: MEDICAL CENTER | Age: 83
End: 2023-11-27

## 2023-11-27 NOTE — TELEPHONE ENCOUNTER
TW    Caller: - Renay - IRHYTHM    Date and time of urgent report:   11/27/23  11:30    On Day 13 of 14 - has reached the max threshold of notifications, would you like them to send out another monitor.     Reference Number:  [id number]    Callback Number: [phone]    Route to David FIGUEROA and Lucia SALAMANCA    Thank you,   Celeste OZUNA

## 2023-11-28 ENCOUNTER — TELEPHONE (OUTPATIENT)
Dept: NEUROLOGY | Facility: MEDICAL CENTER | Age: 83
End: 2023-11-28
Payer: MEDICARE

## 2023-11-28 NOTE — TELEPHONE ENCOUNTER
NEUROLOGY PATIENT PRE-VISIT PLANNING     Patient was NOT contacted to complete PVP.  Note: Patient will not be contacted if there is no indication to call.     Patient Appointment is scheduled as: New Patient     Is visit type and length scheduled correctly? Yes    [facility] is [facility] in Patient Demographics? Yes    3.   Is referral attached to visit? Yes    4. Were records received from referring provider? Yes    4. Patient was NOT contacted to have someone accompany them to visit.     5. Is this appointment scheduled as a Hospital Follow-Up?  Yes    6. Does the patient require any pre procedure or post procedure follow up? No    7. If any orders were placed at last visit or intended to be done for this visit do we have Results/Consult Notes? No  Labs - Labs ordered, completed on 11.13.2023 and results are in chart.  Imaging - Imaging ordered, completed and results are in chart.  Referrals - No referrals were ordered at last office visit.  Note: If patient appointment is for lab or imaging review and patient did not complete the studies, check with provider if OK to reschedule patient until completed.    8. If patient appointment is for Botox - is order pended for provider? N/A    9. Was Plan Assessment from last Neurology Office Visit Reviewed?  Yes

## 2023-11-29 ENCOUNTER — PATIENT MESSAGE (OUTPATIENT)
Dept: HEALTH INFORMATION MANAGEMENT | Facility: OTHER | Age: 83
End: 2023-11-29

## 2023-12-01 ENCOUNTER — OFFICE VISIT (OUTPATIENT)
Dept: NEUROLOGY | Facility: MEDICAL CENTER | Age: 83
End: 2023-12-01
Attending: PSYCHIATRY & NEUROLOGY
Payer: MEDICARE

## 2023-12-01 VITALS
DIASTOLIC BLOOD PRESSURE: 64 MMHG | WEIGHT: 167.55 LBS | HEART RATE: 72 BPM | SYSTOLIC BLOOD PRESSURE: 128 MMHG | OXYGEN SATURATION: 95 % | HEIGHT: 71 IN | RESPIRATION RATE: 14 BRPM | TEMPERATURE: 96.5 F | BODY MASS INDEX: 23.46 KG/M2

## 2023-12-01 DIAGNOSIS — G45.9 TRANSIENT ISCHEMIC ATTACK: ICD-10-CM

## 2023-12-01 PROCEDURE — 3074F SYST BP LT 130 MM HG: CPT | Performed by: PSYCHIATRY & NEUROLOGY

## 2023-12-01 PROCEDURE — 99212 OFFICE O/P EST SF 10 MIN: CPT | Performed by: PSYCHIATRY & NEUROLOGY

## 2023-12-01 PROCEDURE — 99204 OFFICE O/P NEW MOD 45 MIN: CPT | Performed by: PSYCHIATRY & NEUROLOGY

## 2023-12-01 PROCEDURE — 3078F DIAST BP <80 MM HG: CPT | Performed by: PSYCHIATRY & NEUROLOGY

## 2023-12-01 ASSESSMENT — FIBROSIS 4 INDEX: FIB4 SCORE: 1.4

## 2023-12-01 NOTE — PATIENT INSTRUCTIONS
Continue aspirin 81mg daily    Continue simvastatin 40mg daily     You will see a cardiologist to review the cardiac monitor. If the cardiac monitor is negative, I recommend an implantable loop recorder.

## 2023-12-01 NOTE — PROGRESS NOTES
"Chief Complaint   Patient presents with    New Patient     Stroke bridge       History of present illness:  Joshua Cross 83 y.o. male with HTN, HL presents to stroke bridge clinic. This patient presented with transient expressive aphasia on 11/13, and received TNK. He had foggy thinking, and was unable to get his words out.   He was in [employer] and felt that he was in a boat for 5-10 seconds \"wavy\" sensation, but after this his wife was questioning him and he could not speak for about 5 minutes. He thought that he could talk but was unable to. He was trying to answer but nothing came out. The only response he was able to produce was \"yes\".   When he arrived to the ED, he was able to talk again but he was still disoriented therefore he received TNK.   His symptoms improved following tenecteplase.  MRI of the brain was negative for ischemic stroke.  Therefore, he was treated as a minor stroke that was aborted with tenecteplase.  He is a retired dentist, and retired 20 years ago.   He just submitted his cardiac monitor a few days ago.     Past medical history:   Past Medical History:   Diagnosis Date    Alcohol use 2014    \"2 drinks per day\"    Anemia     Back pain 2014    Bowel habit changes     crohn's    Cancer (HCC)     basal and squamous cell skin    Cholesterol blood decreased     Crohn's disease (HCC)     Dental disorder     2 implants, # 3 and 12    Heart burn     Hepatitis A 1980    High cholesterol     Hypertension     Liver abscess 2011    Pneumonia 1995    Skin cancer     Umbilical hernia        Past surgical history:   Past Surgical History:   Procedure Laterality Date    PB SHLDR ARTHROSCOP,SURG,W/ROTAT CUFF REPB Left 3/8/2023    Procedure: LEFT SHOULDER ARTHROSCOPY ROTATOR CUFF REPAIR;  Surgeon: Gordon Zaldivar M.D.;  Location: [facility];  Service: Orthopedics    MD SHLDR ARTHROSCOP PART DEBRIDE 1-2 Left 3/8/2023    Procedure: LEFT LABRAL DEBRIDEMENT;  Surgeon: Gordon Zaldivar, " M.D.;  Location: [facility];  Service: Orthopedics    OH SHLDR ARTHROSCOP,PART ACROMIOPLAS Left 3/8/2023    Procedure: LEFT SUBACROMIAL DECOMPRESSION;  Surgeon: Gordon Zaldivar M.D.;  Location: [facility];  Service: Orthopedics    PB REPAIR BICEPS LONG TENDON Left 3/8/2023    Procedure: LEFT BICEPS TENODESIS, REPAIRS AS INDICATED;  Surgeon: Gordon Zaldivar M.D.;  Location: [facility];  Service: Orthopedics    BONE MARROW ASPIRATION Left 2019    Procedure: BONE MARROW ASPIRATION;  Surgeon: Jay Hinojosa M.D.;  Location: [facility]OSCOPY [facility];  Service: Orthopedics    BONE MARROW BIOPSY, NDL/TROCAR Left 2019    Procedure: BONE MARROW BIOPSY, NDL/TROCAR - GUERARD;  Surgeon: Jay Hinojosa M.D.;  Location: [facility];  Service: Orthopedics    VENTRAL HERNIA REPAIR LAPAROSCOPIC  2014    Performed by Mir Hyatt M.D. at SURGERY [facility]    OH RESECT SMALL INTEST,EACH ADDNL      DX-DRAIN-LIVER ABSCESS-CYST  2011    TONSILLECTOMY         Family history:   No family history on file.    Social history:   Social History     Socioeconomic History    Marital status:      Spouse name: Not on file    Number of children: Not on file    Years of education: Not on file    Highest education level: Not on file   Occupational History    Not on file   Tobacco Use    Smoking status: Former     Current packs/day: 0.00     Average packs/day: 1 pack/day for 10.0 years (10.0 ttl pk-yrs)     Types: Cigarettes     Start date: 1962     Quit date: 1972     Years since quittin.6    Smokeless tobacco: Never   Vaping Use    Vaping Use: Never used   Substance and Sexual Activity    Alcohol use: Yes     Comment: 2 drinks per day, wine    Drug use: No    Sexual activity: Not on file   Other Topics Concern    Not on file   Social History Narrative    Not on file     Social Determinants of Health     Financial  Resource Strain: Not on file   Food Insecurity: Not on file   Transportation Needs: Not on file   Physical Activity: Not on file   Stress: Not on file   Social Connections: Not on file   Intimate Partner Violence: Not on file   Housing Stability: Not on file       Current medications:   Current Outpatient Medications   Medication    aspirin 81 MG EC tablet    Multiple Vitamin (MULTI-VITAMIN DAILY PO)    Ustekinumab (STELARA) 90 MG/ML Solution Prefilled Syringe    chlorthalidone (HYGROTON) 50 MG Tab    Cyanocobalamin (VITAMIN B-12 SL)    VITAMIN D PO    losartan (COZAAR) 25 MG Tab    simvastatin (ZOCOR) 40 MG TABS    acetaminophen (TYLENOL 8 HOUR ARTHRITIS PAIN) 650 MG CR tablet     No current facility-administered medications for this visit.       Medication Allergy:  No Known Allergies    Physical examination:     Current NIHSS    1a. LOC: 0  1b. LOC Questions: 0  1c. LOC Commands: 0  2. Best Gaze:0  3. Visual Fields: 0  4. Facial Paresis: 0  5a. Motor arm left: 0  5b. Motor arm right: 0  6a. Motor leg left: 0  6b. Motor leg right: 0  7. Sensory: 0  8. Best Language: 0  9. Limb Ataxia: 0  10. Dysarthria: 0  11. Extinction/Inattention: 0    Total Score: 0        Current mRS 0    Labs:  I reviewed the following labs personally:  Lab Results   Component Value Date/Time    HBA1C 5.7 (H) 2023 01:45 PM      Lab Results   Component Value Date/Time    CHOLSTRLTOT 96 (L) 2023 0400    TRIGLYCERIDE 90 2023 0400    HDL 42 2023 0400    LDL 36 2023 0400       Imagin23 MRI BRAIN W/O   I reviewed the images personally and agree with the following read:     IMPRESSION:        No acute process.     Age-related volume loss and chronic microvascular ischemic changes.    Transthoracic  Echo Report        Echocardiography Laboratory     CONCLUSIONS  Compared to the images of the prior study 2023, there has been   progression of aortic stenosis, previously mild.     Normal left ventricular  systolic function.  The left ventricular ejection fraction is visually estimated to be 70%.  Normal diastolic function.  The right ventricle is normal in size and systolic function.  Moderate aortic valve stenosis.  Mild aortic insufficiency.    11/13/23 CTA NECK  FINDINGS:  Aortic arch: conventional branching pattern.     There is atherosclerotic plaque of the aorta.     Right common carotid artery: Patent     Right internal carotid artery: Atherosclerotic plaque without significant stenosis (less than 50%).     Left common carotid artery is patent.     Left internal carotid artery: Atherosclerotic plaque without significant stenosis (less than 50%).     The right vertebral artery is patent without dissection or stenosis.     The left vertebral artery is patent without dissection or stenosis.     Vertebrobasilar confluence: The vertebrobasilar confluence appears normal.     Lung apices are clear     The soft tissues of the neck are within normal limits.     3D angiographic/MIP images of the vasculature confirm the vascular findings as described above.   IMPRESSION:     1. No evidence of flow-limiting stenosis in the cervical carotid or cervical vertebral arteries.    CT Perfusion Scan  IMPRESSION:     1.  Cerebral blood flow less than 30% likely representing completed infarct = 0 mL.     2.  T Max more than 6 seconds likely representing combination of completed infarct and ischemia = 0 mL.     3.  Mismatched volume likely representing ischemic brain/penumbra = None     4.  Please note that the cerebral perfusion was performed on the limited brain tissue around the basal ganglia region. Infarct/ischemia outside the CT perfusion sections can be missed in this study.    CT Angiogram Head  FINDINGS:  The posterior circulation shows the distal vertebral arteries to be patent. The vertebrobasilar confluence is intact. The basilar artery is patent. No aneurysm or occlusive lesion is evident.     The anterior circulation  shows no stenotic or occlusive lesion. No aneurysm is evident about the [place].     No acute intracranial hemorrhage.     3D angiographic/MIP images of the vasculature confirm the vascular findings as described above.  ___________________________________     IMPRESSION:        1. No hemodynamically significant narrowing of the major intracranial vessels.    ASSESSMENT AND PLAN:  Problem List Items Addressed This Visit       Transient ischemic attack       1. Transient ischemic attack      Presumed Mechanism by TOAST  __  Large-artery atherosclerosis  __  Cardioembolism  __  Small-vessel occlusion  __  Stroke of other determined etiology   _x_  Stroke of undetermined etiology     Antithrombotic: continue ASA 81mg daily   Blood pressure goal: < 140/90   LDL goal: < 100    83-year-old male with transient ischemic attack for 5 minutes, resulted in expressive aphasia. he has no residual deficits and there is no stroke on the brain MRI scan.  I have counseled him on the diagnosis of a transient ischemic attack.  His stroke workup has been negative thus far.  He will follow-up with cardiology to review the 14-day Zio patch.  I have recommended an implantable loop recorder if the Zio patch is negative.  Will remain on aspirin 81mg and simvastatin 40 as his medical therapy.    FOLLOW-UP:   Return if symptoms worsen or fail to improve.    HE Dos SantosO.  [facility]

## 2023-12-06 ENCOUNTER — TELEPHONE (OUTPATIENT)
Dept: CARDIOLOGY | Facility: MEDICAL CENTER | Age: 83
End: 2023-12-06
Payer: MEDICARE

## 2023-12-06 NOTE — PROGRESS NOTES
Patient enrolled in the 14 day Zio AT [facility].  In-patient hookup.  >Currently processing data.

## 2023-12-07 PROCEDURE — 93228 REMOTE 30 DAY ECG REV/REPORT: CPT | Performed by: INTERNAL MEDICINE

## 2024-01-31 ENCOUNTER — OFFICE VISIT (OUTPATIENT)
Dept: CARDIOLOGY | Facility: MEDICAL CENTER | Age: 84
End: 2024-01-31
Attending: INTERNAL MEDICINE
Payer: MEDICARE

## 2024-01-31 ENCOUNTER — TELEPHONE (OUTPATIENT)
Dept: CARDIOLOGY | Facility: MEDICAL CENTER | Age: 84
End: 2024-01-31

## 2024-01-31 VITALS
OXYGEN SATURATION: 100 % | HEIGHT: 71 IN | HEART RATE: 58 BPM | SYSTOLIC BLOOD PRESSURE: 140 MMHG | DIASTOLIC BLOOD PRESSURE: 60 MMHG | RESPIRATION RATE: 16 BRPM | WEIGHT: 162 LBS | BODY MASS INDEX: 22.68 KG/M2

## 2024-01-31 DIAGNOSIS — I25.84 CORONARY ARTERY CALCIFICATION: ICD-10-CM

## 2024-01-31 DIAGNOSIS — I63.9 CEREBROVASCULAR ACCIDENT (CVA), UNSPECIFIED MECHANISM (HCC): ICD-10-CM

## 2024-01-31 DIAGNOSIS — I48.0 PAF (PAROXYSMAL ATRIAL FIBRILLATION) (HCC): ICD-10-CM

## 2024-01-31 DIAGNOSIS — I10 ESSENTIAL HYPERTENSION: ICD-10-CM

## 2024-01-31 DIAGNOSIS — C88.0 WALDENSTROM'S MACROGLOBULINEMIA (HCC): ICD-10-CM

## 2024-01-31 DIAGNOSIS — I25.10 CORONARY ARTERY CALCIFICATION: ICD-10-CM

## 2024-01-31 PROCEDURE — 3077F SYST BP >= 140 MM HG: CPT | Performed by: INTERNAL MEDICINE

## 2024-01-31 PROCEDURE — 3078F DIAST BP <80 MM HG: CPT | Performed by: INTERNAL MEDICINE

## 2024-01-31 PROCEDURE — 99214 OFFICE O/P EST MOD 30 MIN: CPT | Performed by: INTERNAL MEDICINE

## 2024-01-31 PROCEDURE — 99212 OFFICE O/P EST SF 10 MIN: CPT | Performed by: INTERNAL MEDICINE

## 2024-01-31 ASSESSMENT — FIBROSIS 4 INDEX: FIB4 SCORE: 1.4

## 2024-01-31 NOTE — PROGRESS NOTES
"Chief Complaint   Patient presents with    Heart Murmur     Hospital follow up       Subjective     Sonny Cross is a 83 y.o. male who presents today for initial follow-up regarding a family history of coronary artery disease.  Transferring from outside cardiologist.  Had a single episode of atrial fibrillation that was provoked and spontaneously resolved and had never had a recurrence.  This was noted by his previous cardiologist.  It was not felt anticoagulation was warranted due to the brief and precipitated nature of the event.  Upon further discussion today it indicates it was 2 to 3 years ago actually.      In the interim he presented for acute neurologic deficits that did not resolve until after thrombolytic therapy was administered.  We discussed that though he has no residual imaging findings of organic brain injury, this most closely fits with a CVA as it did not resolve until thrombolytic administration and intervention rather than TIA.  Regardless it was traumatic for him and required the application of life-threatening medical therapy to resolve.  No atrial fibrillation was noted during his stay and he has subsequently wearing a rhythm monitor for 2 weeks that showed no A-fib but SVT.  He was offered aspirin.  Upon review of the records he has no vascular disease and compatible territory and his echocardiogram is unremarkable.  Reviewing his hospital records and neurology consultations and follow-up neurological visits there is no mention of his prior history of atrial fibrillation but a recommendation for loop recorder.    Past Medical History:   Diagnosis Date    Alcohol use 2014    \"2 drinks per day\"    Anemia     Back pain 2014    Bowel habit changes     crohn's    Cancer (HCC)     basal and squamous cell skin    Cholesterol blood decreased     Crohn's disease (HCC)     Dental disorder     2 implants, # 3 and 12    Heart burn     Hepatitis A 1980    High cholesterol     Hypertension     Liver " abscess 2011    Pneumonia 1995    Skin cancer     Umbilical hernia      Past Surgical History:   Procedure Laterality Date    PB SHLDR ARTHROSCOP,SURG,W/ROTAT CUFF REPB Left 3/8/2023    Procedure: LEFT SHOULDER ARTHROSCOPY ROTATOR CUFF REPAIR;  Surgeon: Gordon Zaldivar M.D.;  Location: [facility];  Service: Orthopedics    NV SHLDR ARTHROSCOP PART DEBRIDE 1-2 Left 3/8/2023    Procedure: LEFT LABRAL DEBRIDEMENT;  Surgeon: Gordon Zaldivar M.D.;  Location: [facility];  Service: Orthopedics    NV SHLDR ARTHROSCOP,PART ACROMIOPLAS Left 3/8/2023    Procedure: LEFT SUBACROMIAL DECOMPRESSION;  Surgeon: Gordon Zaldivar M.D.;  Location: [facility];  Service: Orthopedics    PB REPAIR BICEPS LONG TENDON Left 3/8/2023    Procedure: LEFT BICEPS TENODESIS, REPAIRS AS INDICATED;  Surgeon: Gordon Zaldivar M.D.;  Location: [facility];  Service: Orthopedics    BONE MARROW ASPIRATION Left 2/13/2019    Procedure: BONE MARROW ASPIRATION;  Surgeon: Jay Hinojosa M.D.;  Location: ENDOSCOPY [facility];  Service: Orthopedics    BONE MARROW BIOPSY, NDL/TROCAR Left 2/13/2019    Procedure: BONE MARROW BIOPSY, NDL/TROCAR - GUERARD;  Surgeon: Jay Hinojosa M.D.;  Location: [facility];  Service: Orthopedics    VENTRAL HERNIA REPAIR LAPAROSCOPIC  5/8/2014    Performed by Mir Hyatt M.D. at SURGERY [facility]    NV RESECT SMALL INTEST,EACH ADDNL  2012    DX-DRAIN-LIVER ABSCESS-CYST  2011    TONSILLECTOMY  1949     History reviewed. No pertinent family history.  Social History     Socioeconomic History    Marital status:      Spouse name: Not on file    Number of children: Not on file    Years of education: Not on file    Highest education level: Not on file   Occupational History    Not on file   Tobacco Use    Smoking status: Former     Current packs/day: 0.00     Average packs/day: 1 pack/day for 10.0 years (10.0 ttl pk-yrs)  "    Types: Cigarettes     Start date: 1962     Quit date: 1972     Years since quittin.7    Smokeless tobacco: Never   Vaping Use    Vaping Use: Never used   Substance and Sexual Activity    Alcohol use: Yes     Comment: 2 drinks per day, wine    Drug use: No    Sexual activity: Not on file   Other Topics Concern    Not on file   Social History Narrative    Not on file     Social Determinants of Health     Financial Resource Strain: Not on file   Food Insecurity: Not on file   Transportation Needs: Not on file   Physical Activity: Not on file   Stress: Not on file   Social Connections: Not on file   Intimate Partner Violence: Not on file   Housing Stability: Not on file     No Known Allergies  Outpatient Encounter Medications as of 2024   Medication Sig Dispense Refill    apixaban (ELIQUIS) 5mg Tab Take 1 Tablet by mouth 2 times a day. 60 Tablet 11    Multiple Vitamin (MULTI-VITAMIN DAILY PO) Take 1 Tablet by mouth every day.      Ustekinumab (STELARA) 90 MG/ML Solution Prefilled Syringe Inject 90 mg under the skin Every 60 days.      chlorthalidone (HYGROTON) 50 MG Tab Take 50 mg by mouth every day.      Cyanocobalamin (VITAMIN B-12 SL) Place 1 Tab under tongue every day.      VITAMIN D PO Take 1 Capsule by mouth every day.      losartan (COZAAR) 25 MG Tab Take 25 mg by mouth every day.      simvastatin (ZOCOR) 40 MG TABS Take 40 mg by mouth every morning.      acetaminophen (TYLENOL 8 HOUR ARTHRITIS PAIN) 650 MG CR tablet Take 650-1,300 mg by mouth every 6 hours as needed. Indications: Pain      [DISCONTINUED] aspirin 81 MG EC tablet Take 81 mg by mouth every day.       No facility-administered encounter medications on file as of 2024.     Review of Systems   All other systems reviewed and are negative.             Objective     BP (!) 140/60 (BP Location: Left arm, Patient Position: Sitting)   Pulse (!) 58   Resp 16   Ht 1.803 m (5' 11\")   Wt 73.5 kg (162 lb)   SpO2 100%   BMI 22.59 " kg/m²     Physical Exam  Vitals and nursing note reviewed.   Constitutional:       General: He is not in acute distress.     Appearance: Normal appearance.   HENT:      Head: Normocephalic and atraumatic.      Right Ear: External ear normal.      Left Ear: External ear normal.      Nose: Nose normal.   Eyes:      Conjunctiva/sclera: Conjunctivae normal.   Cardiovascular:      Rate and Rhythm: Normal rate and regular rhythm.      Pulses: Normal pulses.      Heart sounds: No murmur heard.  Pulmonary:      Effort: Pulmonary effort is normal. No respiratory distress.      Breath sounds: Normal breath sounds.   Abdominal:      General: There is no distension.      Palpations: Abdomen is soft.   Musculoskeletal:      Cervical back: No rigidity or tenderness.      Right lower leg: No edema.      Left lower leg: No edema.   Skin:     General: Skin is warm and dry.      Capillary Refill: Capillary refill takes 2 to 3 seconds.   Neurological:      General: No focal deficit present.      Mental Status: He is alert and oriented to person, place, and time.   Psychiatric:         Mood and Affect: Mood normal.         Behavior: Behavior normal.         Thought Content: Thought content normal.       LABS:  Lab Results   Component Value Date/Time    CHOLSTRLTOT 96 (L) 11/14/2023 04:00 AM    LDL 36 11/14/2023 04:00 AM    HDL 42 11/14/2023 04:00 AM    TRIGLYCERIDE 90 11/14/2023 04:00 AM       Lab Results   Component Value Date/Time    WBC 4.8 11/14/2023 04:00 AM    RBC 2.85 (L) 11/14/2023 04:00 AM    HEMOGLOBIN 9.2 (L) 11/14/2023 04:00 AM    HEMATOCRIT 28.0 (L) 11/14/2023 04:00 AM    MCV 98.2 (H) 11/14/2023 04:00 AM    NEUTSPOLYS 61.50 11/14/2023 04:00 AM    LYMPHOCYTES 25.60 11/14/2023 04:00 AM    MONOCYTES 9.20 11/14/2023 04:00 AM    EOSINOPHILS 2.50 11/14/2023 04:00 AM    BASOPHILS 0.60 11/14/2023 04:00 AM     Lab Results   Component Value Date/Time    SODIUM 142 11/14/2023 04:00 AM    POTASSIUM 4.6 11/14/2023 04:00 AM    CHLORIDE  "113 (H) 11/14/2023 04:00 AM    CO2 17 (L) 11/14/2023 04:00 AM    GLUCOSE 104 (H) 11/14/2023 04:00 AM    BUN 53 (H) 11/14/2023 04:00 AM    CREATININE 2.50 (H) 11/14/2023 04:00 AM     Lab Results   Component Value Date    HBA1C 5.7 (H) 11/13/2023      Lab Results   Component Value Date/Time    ALKPHOSPHAT 75 11/13/2023 12:14 PM    ASTSGOT 15 11/13/2023 12:14 PM    ALTSGPT 14 11/13/2023 12:14 PM    TBILIRUBIN 0.2 11/13/2023 12:14 PM      No results found for: \"BNPBTYPENAT\"   No results found for: \"TSH\"  Lab Results   Component Value Date/Time    PROTHROMBTM 14.2 11/13/2023 12:14 PM    INR 1.04 11/13/2023 12:14 PM        Imaging reviewed extensively.           Assessment & Plan     1. Cerebrovascular accident (CVA), unspecified mechanism (HCC)  apixaban (ELIQUIS) 5mg Tab      2. PAF (paroxysmal atrial fibrillation) (HCC)  apixaban (ELIQUIS) 5mg Tab      3. Coronary artery calcification        4. Essential hypertension        5. Waldenstrom's macroglobulinemia (HCC)            Medical Decision Making: Today's Assessment/Status/Plan:          Doing very well.  Goal LDL less than 70.  Has a history of nonremote atrial fibrillation that was felt to be a single event however in the context of his recent stroke/neurologic injury requiring application of life-threatening medical therapy in the form of tenecteplase to correct his symptoms, in combination with no other compelling etiology for his syndrome is most likely that atrial fibrillation is related.  We discussed the options of implantable loop recorder versus therapy given his known history of atrial fibrillation.  Reasonably we have decided on the latter.  Review of the records indicates that his neurological consultants may not have been aware that he had a history of atrial fibrillation.  Will initiate Eliquis and lieu of aspirin if affordable, warfarin or other anticoagulants are reasonable alternatives as well.  We discussed briefly laura and he would like to " defer this.  We discussed a watchman would be second line therapy at any rate.    Follow-up routinely

## 2024-01-31 NOTE — TELEPHONE ENCOUNTER
TW      Received request via: Patient    Was the patient seen in the last year in this department? Yes    Does the patient have an active prescription (recently filled or refills available) for medication(s) requested? Yes.     Pharmacy Name: Patient is asking [facility]    Does the patient have FPC Plus and need 100 day supply (blood pressure, diabetes and cholesterol meds only)? Patient does not have SCP        Thank you    -Iain SAMUELS

## 2024-01-31 NOTE — TELEPHONE ENCOUNTER
Attempted to contact patient at [phone] to discuss [facility] and services/benefits offered. No answer, left voicemail.    Lara Wong

## 2024-02-08 ENCOUNTER — TELEPHONE (OUTPATIENT)
Dept: CARDIOLOGY | Facility: MEDICAL CENTER | Age: 84
End: 2024-02-08
Payer: MEDICARE

## 2024-02-08 NOTE — TELEPHONE ENCOUNTER
TW  Caller: Joshua Cross     Topic/issue: Patient was recently put on Eliquis and is experiencing some dizziness after he takes the medication in the morning. Patient would like a call back to discuss if he needs to take the medication at a different time or if he needs to switch to a new medication.    Callback Number: [phone]      Thank you,  Oumou KOVACS

## 2024-02-08 NOTE — TELEPHONE ENCOUNTER
Phone Number Called: [phone]      Call outcome: Did not leave a detailed message. Requested patient to call back.

## 2024-02-21 ENCOUNTER — TELEPHONE (OUTPATIENT)
Dept: OCCUPATIONAL THERAPY | Facility: MEDICAL CENTER | Age: 84
End: 2024-02-21

## 2024-02-21 NOTE — THERAPY
Called patient between 75 and 120 days after discharge. Assessed Modified Court[place] Scale to be 0

## 2024-03-05 ENCOUNTER — HOSPITAL ENCOUNTER (OUTPATIENT)
Dept: LAB | Facility: MEDICAL CENTER | Age: 84
End: 2024-03-05
Attending: FAMILY MEDICINE
Payer: MEDICARE

## 2024-03-05 LAB
ALBUMIN SERPL BCP-MCNC: 4 G/DL (ref 3.2–4.9)
ALBUMIN/GLOB SERPL: 1.5 G/DL
ALP SERPL-CCNC: 91 U/L (ref 30–99)
ALT SERPL-CCNC: 7 U/L (ref 2–50)
ANION GAP SERPL CALC-SCNC: 16 MMOL/L (ref 7–16)
AST SERPL-CCNC: 12 U/L (ref 12–45)
BASOPHILS # BLD AUTO: 0.3 % (ref 0–1.8)
BASOPHILS # BLD: 0.02 K/UL (ref 0–0.12)
BILIRUB SERPL-MCNC: 0.3 MG/DL (ref 0.1–1.5)
BUN SERPL-MCNC: 52 MG/DL (ref 8–22)
CALCIUM ALBUM COR SERPL-MCNC: 8.2 MG/DL (ref 8.5–10.5)
CALCIUM SERPL-MCNC: 8.2 MG/DL (ref 8.5–10.5)
CHLORIDE SERPL-SCNC: 104 MMOL/L (ref 96–112)
CO2 SERPL-SCNC: 15 MMOL/L (ref 20–33)
CREAT SERPL-MCNC: 2.6 MG/DL (ref 0.5–1.4)
EOSINOPHIL # BLD AUTO: 0.04 K/UL (ref 0–0.51)
EOSINOPHIL NFR BLD: 0.7 % (ref 0–6.9)
ERYTHROCYTE [DISTWIDTH] IN BLOOD BY AUTOMATED COUNT: 53 FL (ref 35.9–50)
GFR SERPLBLD CREATININE-BSD FMLA CKD-EPI: 24 ML/MIN/1.73 M 2
GLOBULIN SER CALC-MCNC: 2.6 G/DL (ref 1.9–3.5)
GLUCOSE SERPL-MCNC: 124 MG/DL (ref 65–99)
HCT VFR BLD AUTO: 34.4 % (ref 42–52)
HGB BLD-MCNC: 10.7 G/DL (ref 14–18)
IMM GRANULOCYTES # BLD AUTO: 0.03 K/UL (ref 0–0.11)
IMM GRANULOCYTES NFR BLD AUTO: 0.5 % (ref 0–0.9)
LYMPHOCYTES # BLD AUTO: 1.28 K/UL (ref 1–4.8)
LYMPHOCYTES NFR BLD: 21.8 % (ref 22–41)
MCH RBC QN AUTO: 32.6 PG (ref 27–33)
MCHC RBC AUTO-ENTMCNC: 31.1 G/DL (ref 32.3–36.5)
MCV RBC AUTO: 104.9 FL (ref 81.4–97.8)
MONOCYTES # BLD AUTO: 0.56 K/UL (ref 0–0.85)
MONOCYTES NFR BLD AUTO: 9.6 % (ref 0–13.4)
NEUTROPHILS # BLD AUTO: 3.93 K/UL (ref 1.82–7.42)
NEUTROPHILS NFR BLD: 67.1 % (ref 44–72)
NRBC # BLD AUTO: 0 K/UL
NRBC BLD-RTO: 0 /100 WBC (ref 0–0.2)
PLATELET # BLD AUTO: 192 K/UL (ref 164–446)
PMV BLD AUTO: 11.3 FL (ref 9–12.9)
POTASSIUM SERPL-SCNC: 5 MMOL/L (ref 3.6–5.5)
PROT SERPL-MCNC: 6.6 G/DL (ref 6–8.2)
RBC # BLD AUTO: 3.28 M/UL (ref 4.7–6.1)
SODIUM SERPL-SCNC: 135 MMOL/L (ref 135–145)
WBC # BLD AUTO: 5.9 K/UL (ref 4.8–10.8)

## 2024-03-05 PROCEDURE — 85025 COMPLETE CBC W/AUTO DIFF WBC: CPT

## 2024-03-05 PROCEDURE — 87086 URINE CULTURE/COLONY COUNT: CPT

## 2024-03-05 PROCEDURE — 36415 COLL VENOUS BLD VENIPUNCTURE: CPT

## 2024-03-05 PROCEDURE — 84153 ASSAY OF PSA TOTAL: CPT

## 2024-03-05 PROCEDURE — 80053 COMPREHEN METABOLIC PANEL: CPT

## 2024-03-07 LAB
BACTERIA UR CULT: NORMAL
PSA SERPL DL<=0.01 NG/ML-MCNC: 2.09 NG/ML (ref 0–4)
SIGNIFICANT IND 70042: NORMAL
SITE SITE: NORMAL
SOURCE SOURCE: NORMAL

## 2024-04-15 ENCOUNTER — TELEPHONE (OUTPATIENT)
Dept: CARDIOLOGY | Facility: MEDICAL CENTER | Age: 84
End: 2024-04-15
Payer: MEDICARE

## 2024-04-15 NOTE — LETTER
PROCEDURE/SURGERY CLEARANCE FORM    Date: 4/15/2024   Patient Name: Joshua Cross    Dear Surgeon or Proceduralist,      Thank you for your request for cardiac stratification of our mutual patient Joshua Cross 1940. We have reviewed their [facility] records; and to the best of our understanding this patient has not had stenting, ablation, cardiothoracic surgery or hospitalization for cardiovascular reasons in the past 6 months.  Joshua Cross has been seen within the past 18 months and is considered to have non-modifiable cardiac risk for this low-risk procedure/surgery. They may proceed from a cardiovascular standpoint and may hold their antiplatelet/anticoagulation as briefly as possible. Please have patient resume this medication when hemodynamically stable to do so.     Aspirin or Prasugrel   - hold 7 days prior to procedure/surgery, resume when hemodynamically stable      Clopidrogrel or Ticagrelor  - hold 7 days for all neurological procedures, hold 5 days prior to all other procedure/surgery,  resume when hemodynamically stable     Warfarin - hold 7 days for all neurological procedures, hold 5 days prior to all other procedure/surgery and coordinate with [facility] ([phone]) INR testing and dose management.      Pradaxa/Xarelto/Eliquis/Savesya - hold 1 day prior to procedure for low bleeding risk procedure, 2 days for high bleeding risk procedure, or consider holding 3 days or longer for patients with reduced kidney function (CrCl <30mL/min) or spinal/cranial surgeries/procedures.      If they have a mechanical heart valve, please coordinate with [facility]ticoagulation [facility] ([phone]) the proper management of their anticoagulant in the periprocedural or perioperative period.      Some patients have higher risk for cardiovascular complications or holding medication. If our patient has had prior complications of holding antiplatelet or anticoagulants in the  past and we have seen them after these events, we have addressed these concerns with the patient. They are at an unknown degree of increased risk for recurrent complication.  You may hold anticoagulation/antiplatelets for the procedure or surgery if the benefits of the procedure or surgery outweigh this nonmodifiable risk.      If Joshua Cross 1940 has new symptoms of heart failure decompensation, unstable arrythmia, or angina please reach out and we will assess the patient.      If you have other patient-specific concerns, please feel free to reach out to the patient's cardiologist directly at [phone].     Thank you,       [facility]

## 2024-04-15 NOTE — TELEPHONE ENCOUNTER
"Last OV: 1/31/24  Proposed Surgery: Colonoscopy and Endoscopy   Surgery Date: 5/22/24  Requesting Office Name: [facility]  Fax Number: [phone]  Preference of Location (default is surgery center unless specified by Cardiologist or ALBERT)  Prior Clearance Addressed: No      Anticoags/Antiplatelets: Apixaban   Anticoags/Antiplatelet managed by Cardiology? YES    Outstanding Cardiac Imaging : No  Stent, Cardiac Devices, or Catheterization: No  Ablation, TAVR/Valve (including open heart), Cardioversion: No  Recent Cardiac Hospitalization: Yes  Date:  11/13/23            When: Greater than 3 months since hospitalization   History (cardiac history):   Past Medical History:   Diagnosis Date    Alcohol use 2014    \"2 drinks per day\"    Anemia     Back pain 2014    Bowel habit changes     crohn's    Cancer (HCC)     basal and squamous cell skin    Cholesterol blood decreased     Crohn's disease (HCC)     Dental disorder     2 implants, # 3 and 12    Heart burn     Hepatitis A 1980    High cholesterol     Hypertension     Liver abscess 2011    Pneumonia 1995    Skin cancer     Umbilical hernia              Surgical Clearance Letter Sent: YES   **Scan clearance request letter into [employer].**   "

## 2024-05-30 ENCOUNTER — APPOINTMENT (OUTPATIENT)
Dept: CARDIOLOGY | Facility: MEDICAL CENTER | Age: 84
End: 2024-05-30
Attending: INTERNAL MEDICINE
Payer: MEDICARE

## 2024-06-24 ENCOUNTER — APPOINTMENT (OUTPATIENT)
Dept: ADMISSIONS | Facility: MEDICAL CENTER | Age: 84
End: 2024-06-24
Attending: INTERNAL MEDICINE
Payer: MEDICARE

## 2024-06-25 ENCOUNTER — TELEPHONE (OUTPATIENT)
Dept: CARDIOLOGY | Facility: MEDICAL CENTER | Age: 84
End: 2024-06-25
Payer: MEDICARE

## 2024-06-25 NOTE — LETTER
PROCEDURE/SURGERY CLEARANCE FORM      Encounter Date: 6/25/2024    Patient: Joshua Cross  YOB: 1940    CARDIOLOGIST:  Ernesto Orr M.D.    REFERRING DOCTOR:  No ref. provider found    The following procedure/surgery: CT-NEEDLE CORE BX-RENAL                                            PROCEDURE/SURGERY CLEARANCE FORM    Date: 6/25/2024   Patient Name: Joshua Cross    Dear Surgeon or Proceduralist,      Thank you for your request for cardiac stratification of our mutual patient Joshua Cross 1940. We have reviewed their [facility] records; and to the best of our understanding this patient has not had stenting, ablation, cardiothoracic surgery or hospitalization for cardiovascular reasons in the past 6 months.  Joshua Cross has been seen within the past 18 months and is considered to have non-modifiable cardiac risk for this low-risk procedure/surgery. They may proceed from a cardiovascular standpoint and may hold their antiplatelet/anticoagulation as briefly as possible. Please have patient resume this medication when hemodynamically stable to do so.     Aspirin or Prasugrel   - hold 7 days prior to procedure/surgery, resume when hemodynamically stable      Clopidrogrel or Ticagrelor  - hold 7 days for all neurological procedures, hold 5 days prior to all other procedure/surgery,  resume when hemodynamically stable     Warfarin - hold 7 days for all neurological procedures, hold 5 days prior to all other procedure/surgery and coordinate with [facility] ([phone]) INR testing and dose management.      Pradaxa/Xarelto/Eliquis/Savesya - hold 1 day prior to procedure for low bleeding risk procedure, 2 days for high bleeding risk procedure, or consider holding 3 days or longer for patients with reduced kidney function (CrCl <30mL/min) or spinal/cranial surgeries/procedures.      If they have a mechanical heart valve, please coordinate with [facility]  Anticoagulation Service ([phone]) the proper management of their anticoagulant in the periprocedural or perioperative period.      Some patients have higher risk for cardiovascular complications or holding medication. If our patient has had prior complications of holding antiplatelet or anticoagulants in the past and we have seen them after these events, we have addressed these concerns with the patient. They are at an unknown degree of increased risk for recurrent complication.  You may hold anticoagulation/antiplatelets for the procedure or surgery if the benefits of the procedure or surgery outweigh this nonmodifiable risk.      If Joshua Cross 1940 has new symptoms of heart failure decompensation, unstable arrythmia, or angina please reach out and we will assess the patient.      If you have other patient-specific concerns, please feel free to reach out to the patient's cardiologist directly at [phone].     Thank you,       [facility]

## 2024-06-25 NOTE — TELEPHONE ENCOUNTER
"Last OV: 01/31/2024  Proposed Surgery:  CT-NEEDLE CORE BX-RENAL  Surgery Date: 07/18/2024  Requesting Office Name: SURGERY PRE-POST PROC UNIT [facility]  Fax Number: [phone]  Preference of Location (default is surgery center unless specified by Cardiologist or ALBERT)  Prior Clearance Addressed: No      Anticoags/Antiplatelets: Apixaban   Anticoags/Antiplatelet managed by Cardiology? YES    Outstanding Cardiac Imaging : No  Stent, Cardiac Devices, or Catheterization: No  Ablation, TAVR/Valve (including open heart), Cardioversion: No  Recent Cardiac Hospitalization: No            When: N/A  History (cardiac history):   Past Medical History:   Diagnosis Date    Alcohol use 2014    \"2 drinks per day\"    Anemia     Back pain 2014    Bowel habit changes     crohn's    Cancer (HCC)     basal and squamous cell skin    Cholesterol blood decreased     Crohn's disease (HCC)     Dental disorder     2 implants, # 3 and 12    Heart burn     Hepatitis A 1980    High cholesterol     Hypertension     Liver abscess 2011    Pneumonia 1995    Skin cancer     Umbilical hernia              Surgical Clearance Letter Sent: YES   **Scan clearance request letter into [employer].**   "

## 2024-06-28 ENCOUNTER — PRE-ADMISSION TESTING (OUTPATIENT)
Dept: ADMISSIONS | Facility: MEDICAL CENTER | Age: 84
End: 2024-06-28
Attending: INTERNAL MEDICINE
Payer: MEDICARE

## 2024-06-28 RX ORDER — DAPAGLIFLOZIN 10 MG/1
10 TABLET, FILM COATED ORAL 2 TIMES DAILY
COMMUNITY
Start: 2024-05-01

## 2024-07-08 ENCOUNTER — APPOINTMENT (OUTPATIENT)
Dept: ADMISSIONS | Facility: MEDICAL CENTER | Age: 84
End: 2024-07-08
Attending: INTERNAL MEDICINE
Payer: MEDICARE

## 2024-07-08 DIAGNOSIS — Z01.812 PRE-OPERATIVE LABORATORY EXAMINATION: ICD-10-CM

## 2024-07-08 LAB
ERYTHROCYTE [DISTWIDTH] IN BLOOD BY AUTOMATED COUNT: 53.5 FL (ref 35.9–50)
HCT VFR BLD AUTO: 28.7 % (ref 42–52)
HGB BLD-MCNC: 9.5 G/DL (ref 14–18)
INR PPP: 1.27 (ref 0.87–1.13)
MCH RBC QN AUTO: 34.1 PG (ref 27–33)
MCHC RBC AUTO-ENTMCNC: 33.1 G/DL (ref 32.3–36.5)
MCV RBC AUTO: 102.9 FL (ref 81.4–97.8)
PLATELET # BLD AUTO: 249 K/UL (ref 164–446)
PMV BLD AUTO: 9.8 FL (ref 9–12.9)
PROTHROMBIN TIME: 16 SEC (ref 12–14.6)
RBC # BLD AUTO: 2.79 M/UL (ref 4.7–6.1)
WBC # BLD AUTO: 4.8 K/UL (ref 4.8–10.8)

## 2024-07-08 PROCEDURE — 85027 COMPLETE CBC AUTOMATED: CPT

## 2024-07-08 PROCEDURE — 36415 COLL VENOUS BLD VENIPUNCTURE: CPT

## 2024-07-08 PROCEDURE — 85610 PROTHROMBIN TIME: CPT

## 2024-07-18 ENCOUNTER — APPOINTMENT (OUTPATIENT)
Dept: RADIOLOGY | Facility: MEDICAL CENTER | Age: 84
End: 2024-07-18
Attending: INTERNAL MEDICINE
Payer: MEDICARE

## 2024-07-18 ENCOUNTER — HOSPITAL ENCOUNTER (OUTPATIENT)
Facility: MEDICAL CENTER | Age: 84
End: 2024-07-18
Attending: INTERNAL MEDICINE | Admitting: INTERNAL MEDICINE
Payer: MEDICARE

## 2024-07-18 VITALS
TEMPERATURE: 97.6 F | SYSTOLIC BLOOD PRESSURE: 127 MMHG | RESPIRATION RATE: 18 BRPM | OXYGEN SATURATION: 94 % | DIASTOLIC BLOOD PRESSURE: 56 MMHG | BODY MASS INDEX: 21.54 KG/M2 | HEART RATE: 62 BPM | HEIGHT: 71 IN | WEIGHT: 153.88 LBS

## 2024-07-18 DIAGNOSIS — R80.8 OTHER PROTEINURIA: ICD-10-CM

## 2024-07-18 LAB
ERYTHROCYTE [DISTWIDTH] IN BLOOD BY AUTOMATED COUNT: 50 FL (ref 35.9–50)
HCT VFR BLD AUTO: 30.3 % (ref 42–52)
HGB BLD-MCNC: 10 G/DL (ref 14–18)
INR PPP: 1.04 (ref 0.87–1.13)
MCH RBC QN AUTO: 33.3 PG (ref 27–33)
MCHC RBC AUTO-ENTMCNC: 33 G/DL (ref 32.3–36.5)
MCV RBC AUTO: 101 FL (ref 81.4–97.8)
PATHOLOGY CONSULT NOTE: NORMAL
PLATELET # BLD AUTO: 234 K/UL (ref 164–446)
PMV BLD AUTO: 9.8 FL (ref 9–12.9)
PROTHROMBIN TIME: 13.8 SEC (ref 12–14.6)
RBC # BLD AUTO: 3 M/UL (ref 4.7–6.1)
WBC # BLD AUTO: 5.9 K/UL (ref 4.8–10.8)

## 2024-07-18 PROCEDURE — 85027 COMPLETE CBC AUTOMATED: CPT

## 2024-07-18 PROCEDURE — 4401636 CT-NEEDLE CORE BX-RENAL

## 2024-07-18 PROCEDURE — 700111 HCHG RX REV CODE 636 W/ 250 OVERRIDE (IP): Performed by: STUDENT IN AN ORGANIZED HEALTH CARE EDUCATION/TRAINING PROGRAM

## 2024-07-18 PROCEDURE — 85610 PROTHROMBIN TIME: CPT

## 2024-07-18 PROCEDURE — 88300 SURGICAL PATH GROSS: CPT

## 2024-07-18 PROCEDURE — 160002 HCHG RECOVERY MINUTES (STAT)

## 2024-07-18 PROCEDURE — 160046 HCHG PACU - 1ST 60 MINS PHASE II

## 2024-07-18 PROCEDURE — 700111 HCHG RX REV CODE 636 W/ 250 OVERRIDE (IP): Mod: JZ | Performed by: STUDENT IN AN ORGANIZED HEALTH CARE EDUCATION/TRAINING PROGRAM

## 2024-07-18 PROCEDURE — 160047 HCHG PACU  - EA ADDL 30 MINS PHASE II

## 2024-07-18 PROCEDURE — 160035 HCHG PACU - 1ST 60 MINS PHASE I

## 2024-07-18 PROCEDURE — 700111 HCHG RX REV CODE 636 W/ 250 OVERRIDE (IP): Mod: JZ

## 2024-07-18 RX ORDER — SODIUM CHLORIDE 9 MG/ML
500 INJECTION, SOLUTION INTRAVENOUS
Status: DISCONTINUED | OUTPATIENT
Start: 2024-07-18 | End: 2024-07-18 | Stop reason: HOSPADM

## 2024-07-18 RX ORDER — HYDRALAZINE HYDROCHLORIDE 20 MG/ML
10 INJECTION INTRAMUSCULAR; INTRAVENOUS ONCE
Status: COMPLETED | OUTPATIENT
Start: 2024-07-18 | End: 2024-07-18

## 2024-07-18 RX ORDER — MIDAZOLAM HYDROCHLORIDE 1 MG/ML
.5-2 INJECTION INTRAMUSCULAR; INTRAVENOUS PRN
Status: DISCONTINUED | OUTPATIENT
Start: 2024-07-18 | End: 2024-07-18 | Stop reason: HOSPADM

## 2024-07-18 RX ORDER — MIDAZOLAM HYDROCHLORIDE 1 MG/ML
INJECTION INTRAMUSCULAR; INTRAVENOUS
Status: COMPLETED
Start: 2024-07-18 | End: 2024-07-18

## 2024-07-18 RX ORDER — HYDRALAZINE HYDROCHLORIDE 20 MG/ML
INJECTION INTRAMUSCULAR; INTRAVENOUS
Status: COMPLETED
Start: 2024-07-18 | End: 2024-07-18

## 2024-07-18 RX ORDER — ONDANSETRON 2 MG/ML
4 INJECTION INTRAMUSCULAR; INTRAVENOUS PRN
Status: DISCONTINUED | OUTPATIENT
Start: 2024-07-18 | End: 2024-07-18 | Stop reason: HOSPADM

## 2024-07-18 RX ADMIN — FENTANYL CITRATE 50 MCG: 50 INJECTION, SOLUTION INTRAMUSCULAR; INTRAVENOUS at 13:17

## 2024-07-18 RX ADMIN — MIDAZOLAM HYDROCHLORIDE 1 MG: 2 INJECTION, SOLUTION INTRAMUSCULAR; INTRAVENOUS at 13:32

## 2024-07-18 RX ADMIN — FENTANYL CITRATE 50 MCG: 50 INJECTION, SOLUTION INTRAMUSCULAR; INTRAVENOUS at 13:21

## 2024-07-18 RX ADMIN — MIDAZOLAM HYDROCHLORIDE 1 MG: 2 INJECTION, SOLUTION INTRAMUSCULAR; INTRAVENOUS at 13:28

## 2024-07-18 RX ADMIN — MIDAZOLAM HYDROCHLORIDE 1 MG: 1 INJECTION, SOLUTION INTRAMUSCULAR; INTRAVENOUS at 13:28

## 2024-07-18 RX ADMIN — MIDAZOLAM HYDROCHLORIDE 1 MG: 1 INJECTION, SOLUTION INTRAMUSCULAR; INTRAVENOUS at 13:17

## 2024-07-18 RX ADMIN — HYDRALAZINE HYDROCHLORIDE 10 MG: 20 INJECTION, SOLUTION INTRAMUSCULAR; INTRAVENOUS at 13:17

## 2024-07-18 RX ADMIN — MIDAZOLAM HYDROCHLORIDE 1 MG: 2 INJECTION, SOLUTION INTRAMUSCULAR; INTRAVENOUS at 13:19

## 2024-07-18 RX ADMIN — HYDRALAZINE HYDROCHLORIDE 10 MG: 20 INJECTION INTRAMUSCULAR; INTRAVENOUS at 13:17

## 2024-07-18 RX ADMIN — HYDRALAZINE HYDROCHLORIDE 10 MG: 20 INJECTION, SOLUTION INTRAMUSCULAR; INTRAVENOUS at 13:28

## 2024-07-18 RX ADMIN — MIDAZOLAM HYDROCHLORIDE 1 MG: 2 INJECTION, SOLUTION INTRAMUSCULAR; INTRAVENOUS at 13:17

## 2024-07-18 ASSESSMENT — PAIN DESCRIPTION - PAIN TYPE
TYPE: SURGICAL PAIN

## 2024-07-18 ASSESSMENT — FIBROSIS 4 INDEX: FIB4 SCORE: 1.51

## 2024-07-22 ENCOUNTER — HOSPITAL ENCOUNTER (OUTPATIENT)
Dept: LAB | Facility: MEDICAL CENTER | Age: 84
End: 2024-07-22
Attending: INTERNAL MEDICINE
Payer: MEDICARE

## 2024-07-22 LAB
25(OH)D3 SERPL-MCNC: 66 NG/ML (ref 30–100)
ALBUMIN SERPL BCP-MCNC: 4.2 G/DL (ref 3.2–4.9)
APPEARANCE UR: CLEAR
BACTERIA #/AREA URNS HPF: NEGATIVE /HPF
BASOPHILS # BLD AUTO: 0.3 % (ref 0–1.8)
BASOPHILS # BLD: 0.02 K/UL (ref 0–0.12)
BILIRUB UR QL STRIP.AUTO: NEGATIVE
BUN SERPL-MCNC: 55 MG/DL (ref 8–22)
CALCIUM ALBUM COR SERPL-MCNC: 8.2 MG/DL (ref 8.5–10.5)
CALCIUM SERPL-MCNC: 8.4 MG/DL (ref 8.5–10.5)
CHLORIDE SERPL-SCNC: 111 MMOL/L (ref 96–112)
CO2 SERPL-SCNC: 17 MMOL/L (ref 20–33)
COLOR UR: YELLOW
CREAT SERPL-MCNC: 2.94 MG/DL (ref 0.5–1.4)
CREAT UR-MCNC: 66.82 MG/DL
EOSINOPHIL # BLD AUTO: 0.06 K/UL (ref 0–0.51)
EOSINOPHIL NFR BLD: 1 % (ref 0–6.9)
EPI CELLS #/AREA URNS HPF: NEGATIVE /HPF
ERYTHROCYTE [DISTWIDTH] IN BLOOD BY AUTOMATED COUNT: 51.8 FL (ref 35.9–50)
FERRITIN SERPL-MCNC: 89.3 NG/ML (ref 22–322)
GFR SERPLBLD CREATININE-BSD FMLA CKD-EPI: 20 ML/MIN/1.73 M 2
GLUCOSE SERPL-MCNC: 109 MG/DL (ref 65–99)
GLUCOSE UR STRIP.AUTO-MCNC: 100 MG/DL
HCT VFR BLD AUTO: 30.3 % (ref 42–52)
HGB BLD-MCNC: 9.5 G/DL (ref 14–18)
HYALINE CASTS #/AREA URNS LPF: ABNORMAL /LPF
IMM GRANULOCYTES # BLD AUTO: 0.03 K/UL (ref 0–0.11)
IMM GRANULOCYTES NFR BLD AUTO: 0.5 % (ref 0–0.9)
IRON SATN MFR SERPL: 22 % (ref 15–55)
IRON SERPL-MCNC: 67 UG/DL (ref 50–180)
KETONES UR STRIP.AUTO-MCNC: NEGATIVE MG/DL
LEUKOCYTE ESTERASE UR QL STRIP.AUTO: NEGATIVE
LYMPHOCYTES # BLD AUTO: 1.06 K/UL (ref 1–4.8)
LYMPHOCYTES NFR BLD: 18.3 % (ref 22–41)
MCH RBC QN AUTO: 32.4 PG (ref 27–33)
MCHC RBC AUTO-ENTMCNC: 31.4 G/DL (ref 32.3–36.5)
MCV RBC AUTO: 103.4 FL (ref 81.4–97.8)
MICRO URNS: ABNORMAL
MONOCYTES # BLD AUTO: 0.49 K/UL (ref 0–0.85)
MONOCYTES NFR BLD AUTO: 8.5 % (ref 0–13.4)
NEUTROPHILS # BLD AUTO: 4.12 K/UL (ref 1.82–7.42)
NEUTROPHILS NFR BLD: 71.4 % (ref 44–72)
NITRITE UR QL STRIP.AUTO: NEGATIVE
NRBC # BLD AUTO: 0 K/UL
NRBC BLD-RTO: 0 /100 WBC (ref 0–0.2)
PH UR STRIP.AUTO: 6 [PH] (ref 5–8)
PHOSPHATE SERPL-MCNC: 3.6 MG/DL (ref 2.5–4.5)
PLATELET # BLD AUTO: 238 K/UL (ref 164–446)
PMV BLD AUTO: 10.2 FL (ref 9–12.9)
POTASSIUM SERPL-SCNC: 5.1 MMOL/L (ref 3.6–5.5)
PROT UR QL STRIP: 30 MG/DL
PTH-INTACT SERPL-MCNC: 113 PG/ML (ref 14–72)
RBC # BLD AUTO: 2.93 M/UL (ref 4.7–6.1)
RBC # URNS HPF: ABNORMAL /HPF
RBC UR QL AUTO: NEGATIVE
SODIUM SERPL-SCNC: 143 MMOL/L (ref 135–145)
SP GR UR STRIP.AUTO: 1.02
TIBC SERPL-MCNC: 304 UG/DL (ref 250–450)
UIBC SERPL-MCNC: 237 UG/DL (ref 110–370)
UROBILINOGEN UR STRIP.AUTO-MCNC: 0.2 MG/DL
WBC # BLD AUTO: 5.8 K/UL (ref 4.8–10.8)
WBC #/AREA URNS HPF: ABNORMAL /HPF

## 2024-07-22 PROCEDURE — 85025 COMPLETE CBC W/AUTO DIFF WBC: CPT

## 2024-07-22 PROCEDURE — 80069 RENAL FUNCTION PANEL: CPT

## 2024-07-22 PROCEDURE — 82306 VITAMIN D 25 HYDROXY: CPT

## 2024-07-22 PROCEDURE — 83540 ASSAY OF IRON: CPT

## 2024-07-22 PROCEDURE — 81001 URINALYSIS AUTO W/SCOPE: CPT

## 2024-07-22 PROCEDURE — 82728 ASSAY OF FERRITIN: CPT

## 2024-07-22 PROCEDURE — 82043 UR ALBUMIN QUANTITATIVE: CPT

## 2024-07-22 PROCEDURE — 83550 IRON BINDING TEST: CPT

## 2024-07-22 PROCEDURE — 82570 ASSAY OF URINE CREATININE: CPT

## 2024-07-22 PROCEDURE — 36415 COLL VENOUS BLD VENIPUNCTURE: CPT

## 2024-07-22 PROCEDURE — 83970 ASSAY OF PARATHORMONE: CPT

## 2024-07-23 LAB
CREAT UR-MCNC: 66.28 MG/DL
MICROALBUMIN UR-MCNC: 17 MG/DL
MICROALBUMIN/CREAT UR: 256 MG/G (ref 0–30)

## 2024-08-05 ENCOUNTER — HOSPITAL ENCOUNTER (OUTPATIENT)
Dept: RADIOLOGY | Facility: MEDICAL CENTER | Age: 84
End: 2024-08-05
Attending: INTERNAL MEDICINE
Payer: MEDICARE

## 2024-08-05 DIAGNOSIS — E83.51 HYPOCALCEMIA: ICD-10-CM

## 2024-08-05 DIAGNOSIS — R80.3: ICD-10-CM

## 2024-08-05 DIAGNOSIS — D64.9 ANEMIA, UNSPECIFIED TYPE: ICD-10-CM

## 2024-08-05 DIAGNOSIS — N64.4 MASTODYNIA: ICD-10-CM

## 2024-08-05 DIAGNOSIS — N18.9 CHRONIC KIDNEY DISEASE, UNSPECIFIED CKD STAGE: ICD-10-CM

## 2024-08-05 DIAGNOSIS — C88.0 WALDENSTROMS MACROGLOBULINEMIA (HCC): ICD-10-CM

## 2024-08-05 PROCEDURE — 76642 ULTRASOUND BREAST LIMITED: CPT | Mod: LT

## 2024-08-05 PROCEDURE — G0279 TOMOSYNTHESIS, MAMMO: HCPCS

## 2024-10-02 ENCOUNTER — HOSPITAL ENCOUNTER (OUTPATIENT)
Dept: LAB | Facility: MEDICAL CENTER | Age: 84
End: 2024-10-02
Attending: INTERNAL MEDICINE
Payer: MEDICARE

## 2024-10-02 LAB
ALBUMIN SERPL BCP-MCNC: 3.9 G/DL (ref 3.2–4.9)
APPEARANCE UR: CLEAR
BACTERIA #/AREA URNS HPF: NEGATIVE /HPF
BASOPHILS # BLD AUTO: 0.3 % (ref 0–1.8)
BASOPHILS # BLD: 0.02 K/UL (ref 0–0.12)
BILIRUB UR QL STRIP.AUTO: NEGATIVE
BUN SERPL-MCNC: 56 MG/DL (ref 8–22)
CALCIUM ALBUM COR SERPL-MCNC: 9.7 MG/DL (ref 8.5–10.5)
CALCIUM SERPL-MCNC: 9.6 MG/DL (ref 8.5–10.5)
CHLORIDE SERPL-SCNC: 110 MMOL/L (ref 96–112)
CO2 SERPL-SCNC: 22 MMOL/L (ref 20–33)
COLOR UR: YELLOW
CREAT SERPL-MCNC: 3.13 MG/DL (ref 0.5–1.4)
EOSINOPHIL # BLD AUTO: 0.1 K/UL (ref 0–0.51)
EOSINOPHIL NFR BLD: 1.6 % (ref 0–6.9)
EPI CELLS #/AREA URNS HPF: NEGATIVE /HPF
ERYTHROCYTE [DISTWIDTH] IN BLOOD BY AUTOMATED COUNT: 49 FL (ref 35.9–50)
FERRITIN SERPL-MCNC: 70.4 NG/ML (ref 22–322)
GFR SERPLBLD CREATININE-BSD FMLA CKD-EPI: 19 ML/MIN/1.73 M 2
GLUCOSE SERPL-MCNC: 107 MG/DL (ref 65–99)
GLUCOSE UR STRIP.AUTO-MCNC: 500 MG/DL
HCT VFR BLD AUTO: 28.2 % (ref 42–52)
HGB BLD-MCNC: 9.3 G/DL (ref 14–18)
HYALINE CASTS #/AREA URNS LPF: ABNORMAL /LPF
IMM GRANULOCYTES # BLD AUTO: 0.04 K/UL (ref 0–0.11)
IMM GRANULOCYTES NFR BLD AUTO: 0.7 % (ref 0–0.9)
IRON SATN MFR SERPL: 20 % (ref 15–55)
IRON SERPL-MCNC: 66 UG/DL (ref 50–180)
KETONES UR STRIP.AUTO-MCNC: NEGATIVE MG/DL
LEUKOCYTE ESTERASE UR QL STRIP.AUTO: NEGATIVE
LYMPHOCYTES # BLD AUTO: 1.21 K/UL (ref 1–4.8)
LYMPHOCYTES NFR BLD: 19.7 % (ref 22–41)
MCH RBC QN AUTO: 33.2 PG (ref 27–33)
MCHC RBC AUTO-ENTMCNC: 33 G/DL (ref 32.3–36.5)
MCV RBC AUTO: 100.7 FL (ref 81.4–97.8)
MICRO URNS: ABNORMAL
MONOCYTES # BLD AUTO: 0.57 K/UL (ref 0–0.85)
MONOCYTES NFR BLD AUTO: 9.3 % (ref 0–13.4)
NEUTROPHILS # BLD AUTO: 4.21 K/UL (ref 1.82–7.42)
NEUTROPHILS NFR BLD: 68.4 % (ref 44–72)
NITRITE UR QL STRIP.AUTO: NEGATIVE
NRBC # BLD AUTO: 0 K/UL
NRBC BLD-RTO: 0 /100 WBC (ref 0–0.2)
PH UR STRIP.AUTO: 6 [PH] (ref 5–8)
PHOSPHATE SERPL-MCNC: 3.4 MG/DL (ref 2.5–4.5)
PLATELET # BLD AUTO: 252 K/UL (ref 164–446)
PMV BLD AUTO: 9.8 FL (ref 9–12.9)
POTASSIUM SERPL-SCNC: 5 MMOL/L (ref 3.6–5.5)
PROT UR QL STRIP: 30 MG/DL
RBC # BLD AUTO: 2.8 M/UL (ref 4.7–6.1)
RBC # URNS HPF: ABNORMAL /HPF
RBC UR QL AUTO: NEGATIVE
SODIUM SERPL-SCNC: 145 MMOL/L (ref 135–145)
SP GR UR STRIP.AUTO: 1.01
TIBC SERPL-MCNC: 330 UG/DL (ref 250–450)
UIBC SERPL-MCNC: 264 UG/DL (ref 110–370)
UROBILINOGEN UR STRIP.AUTO-MCNC: 0.2 MG/DL
WBC # BLD AUTO: 6.2 K/UL (ref 4.8–10.8)
WBC #/AREA URNS HPF: ABNORMAL /HPF

## 2024-10-02 PROCEDURE — 83550 IRON BINDING TEST: CPT

## 2024-10-02 PROCEDURE — 36415 COLL VENOUS BLD VENIPUNCTURE: CPT

## 2024-10-02 PROCEDURE — 81001 URINALYSIS AUTO W/SCOPE: CPT

## 2024-10-02 PROCEDURE — 82728 ASSAY OF FERRITIN: CPT

## 2024-10-02 PROCEDURE — 85025 COMPLETE CBC W/AUTO DIFF WBC: CPT

## 2024-10-02 PROCEDURE — 80069 RENAL FUNCTION PANEL: CPT

## 2024-10-02 PROCEDURE — 83540 ASSAY OF IRON: CPT

## 2024-10-02 PROCEDURE — 82043 UR ALBUMIN QUANTITATIVE: CPT

## 2024-10-02 PROCEDURE — 82570 ASSAY OF URINE CREATININE: CPT | Mod: 91

## 2024-10-03 LAB
CREAT UR-MCNC: 58.53 MG/DL
CREAT UR-MCNC: 64.6 MG/DL
MICROALBUMIN UR-MCNC: 21.2 MG/DL
MICROALBUMIN/CREAT UR: 328 MG/G (ref 0–30)

## 2024-10-21 DIAGNOSIS — D63.1 ANEMIA DUE TO STAGE 4 CHRONIC KIDNEY DISEASE (HCC): ICD-10-CM

## 2024-10-21 DIAGNOSIS — N18.4 ANEMIA DUE TO STAGE 4 CHRONIC KIDNEY DISEASE (HCC): ICD-10-CM

## 2024-10-21 RX ORDER — 0.9 % SODIUM CHLORIDE 0.9 %
10 VIAL (ML) INJECTION PRN
OUTPATIENT
Start: 2024-10-22

## 2024-10-21 RX ORDER — 0.9 % SODIUM CHLORIDE 0.9 %
3 VIAL (ML) INJECTION PRN
OUTPATIENT
Start: 2024-10-22

## 2024-10-21 RX ORDER — 0.9 % SODIUM CHLORIDE 0.9 %
VIAL (ML) INJECTION PRN
OUTPATIENT
Start: 2024-10-22

## 2024-10-21 RX ORDER — SODIUM CHLORIDE 9 MG/ML
INJECTION, SOLUTION INTRAVENOUS CONTINUOUS
OUTPATIENT
Start: 2024-10-22

## 2024-11-03 ENCOUNTER — APPOINTMENT (OUTPATIENT)
Dept: ONCOLOGY | Facility: MEDICAL CENTER | Age: 84
End: 2024-11-03
Attending: INTERNAL MEDICINE
Payer: MEDICARE

## 2024-11-03 VITALS
BODY MASS INDEX: 22.25 KG/M2 | HEIGHT: 71 IN | TEMPERATURE: 96.6 F | DIASTOLIC BLOOD PRESSURE: 61 MMHG | OXYGEN SATURATION: 96 % | SYSTOLIC BLOOD PRESSURE: 141 MMHG | HEART RATE: 62 BPM | RESPIRATION RATE: 18 BRPM | WEIGHT: 158.95 LBS

## 2024-11-03 DIAGNOSIS — N18.4 ANEMIA DUE TO STAGE 4 CHRONIC KIDNEY DISEASE (HCC): ICD-10-CM

## 2024-11-03 DIAGNOSIS — D63.1 ANEMIA DUE TO STAGE 4 CHRONIC KIDNEY DISEASE (HCC): ICD-10-CM

## 2024-11-03 PROCEDURE — 96365 THER/PROPH/DIAG IV INF INIT: CPT

## 2024-11-03 PROCEDURE — 700111 HCHG RX REV CODE 636 W/ 250 OVERRIDE (IP): Mod: JZ,JG | Performed by: INTERNAL MEDICINE

## 2024-11-03 PROCEDURE — 700105 HCHG RX REV CODE 258: Performed by: INTERNAL MEDICINE

## 2024-11-03 RX ORDER — 0.9 % SODIUM CHLORIDE 0.9 %
VIAL (ML) INJECTION PRN
Status: CANCELLED | OUTPATIENT
Start: 2024-11-10

## 2024-11-03 RX ORDER — 0.9 % SODIUM CHLORIDE 0.9 %
3 VIAL (ML) INJECTION PRN
Status: CANCELLED | OUTPATIENT
Start: 2024-11-10

## 2024-11-03 RX ORDER — SODIUM CHLORIDE 9 MG/ML
INJECTION, SOLUTION INTRAVENOUS CONTINUOUS
Status: CANCELLED | OUTPATIENT
Start: 2024-11-10

## 2024-11-03 RX ORDER — 0.9 % SODIUM CHLORIDE 0.9 %
10 VIAL (ML) INJECTION PRN
Status: CANCELLED | OUTPATIENT
Start: 2024-11-10

## 2024-11-03 RX ORDER — FAMOTIDINE 20 MG/1
TABLET, FILM COATED ORAL
COMMUNITY
Start: 2024-08-31

## 2024-11-03 RX ORDER — AMLODIPINE BESYLATE 2.5 MG/1
5 TABLET ORAL
COMMUNITY

## 2024-11-03 RX ADMIN — FERUMOXYTOL 510 MG: 510 INJECTION INTRAVENOUS at 14:36

## 2024-11-03 ASSESSMENT — FIBROSIS 4 INDEX: FIB4 SCORE: 1.511857892036908909

## 2024-11-03 NOTE — PROGRESS NOTES
Pt presented to infusion center for feraheme. POC discussed, including time of treatment, pt agreeable. PIV started, brisk blood return observed. Iron labs drawn 10/2/2024 reviewed. Feraheme infused as ordered over 30 minutes followed by 30 minute observation period.  Sonny tolerated well, no s/s adverse reaction observed or verbalized.  PIV flushed and removed, gauze dressing placed. Sonny left on foot in no apparent distress, returns in 1 week for next infusion.

## 2024-11-10 ENCOUNTER — OUTPATIENT INFUSION SERVICES (OUTPATIENT)
Dept: ONCOLOGY | Facility: MEDICAL CENTER | Age: 84
End: 2024-11-10
Attending: INTERNAL MEDICINE
Payer: MEDICARE

## 2024-11-10 VITALS
RESPIRATION RATE: 18 BRPM | WEIGHT: 160.27 LBS | TEMPERATURE: 96.6 F | HEART RATE: 62 BPM | OXYGEN SATURATION: 95 % | SYSTOLIC BLOOD PRESSURE: 138 MMHG | BODY MASS INDEX: 22.44 KG/M2 | DIASTOLIC BLOOD PRESSURE: 62 MMHG | HEIGHT: 71 IN

## 2024-11-10 DIAGNOSIS — D63.1 ANEMIA DUE TO STAGE 4 CHRONIC KIDNEY DISEASE (HCC): ICD-10-CM

## 2024-11-10 DIAGNOSIS — N18.4 ANEMIA DUE TO STAGE 4 CHRONIC KIDNEY DISEASE (HCC): ICD-10-CM

## 2024-11-10 PROCEDURE — 700111 HCHG RX REV CODE 636 W/ 250 OVERRIDE (IP): Mod: JZ,JG | Performed by: INTERNAL MEDICINE

## 2024-11-10 PROCEDURE — 96365 THER/PROPH/DIAG IV INF INIT: CPT

## 2024-11-10 PROCEDURE — 700105 HCHG RX REV CODE 258: Performed by: INTERNAL MEDICINE

## 2024-11-10 RX ORDER — SODIUM CHLORIDE 9 MG/ML
INJECTION, SOLUTION INTRAVENOUS CONTINUOUS
Status: CANCELLED | OUTPATIENT
Start: 2024-11-10

## 2024-11-10 RX ORDER — 0.9 % SODIUM CHLORIDE 0.9 %
3 VIAL (ML) INJECTION PRN
Status: CANCELLED | OUTPATIENT
Start: 2024-11-10

## 2024-11-10 RX ORDER — 0.9 % SODIUM CHLORIDE 0.9 %
10 VIAL (ML) INJECTION PRN
Status: CANCELLED | OUTPATIENT
Start: 2024-11-10

## 2024-11-10 RX ORDER — 0.9 % SODIUM CHLORIDE 0.9 %
VIAL (ML) INJECTION PRN
Status: CANCELLED | OUTPATIENT
Start: 2024-11-10

## 2024-11-10 RX ADMIN — FERUMOXYTOL 510 MG: 510 INJECTION INTRAVENOUS at 09:36

## 2024-11-10 ASSESSMENT — FIBROSIS 4 INDEX: FIB4 SCORE: 1.511857892036908909

## 2024-11-10 NOTE — PROGRESS NOTES
Sonny arrived to the Infusion Center for D2 of Feraheme. Pt denies changes to health, medication or allergies. POC reviewed.    IV started in L AC, brisk blood return noted.     Feraheme administered per MD order, Pt tolerated well. Sonny monitored for 30 minutes post infusion, no s/sx of reaction noted, VSS.     IV removed, tip intact/gauze and coban applied.      Future appointment not needed at this time and sonny was discharged home in no acute distress.

## 2025-01-02 ENCOUNTER — HOSPITAL ENCOUNTER (OUTPATIENT)
Dept: LAB | Facility: MEDICAL CENTER | Age: 85
End: 2025-01-02
Attending: INTERNAL MEDICINE
Payer: MEDICARE

## 2025-01-02 LAB
APPEARANCE UR: CLEAR
BACTERIA #/AREA URNS HPF: NORMAL /HPF
BASOPHILS # BLD AUTO: 0.6 % (ref 0–1.8)
BASOPHILS # BLD: 0.03 K/UL (ref 0–0.12)
BILIRUB UR QL STRIP.AUTO: NEGATIVE
CASTS URNS QL MICRO: NORMAL /LPF (ref 0–2)
COLOR UR: YELLOW
EOSINOPHIL # BLD AUTO: 0.06 K/UL (ref 0–0.51)
EOSINOPHIL NFR BLD: 1.3 % (ref 0–6.9)
EPITHELIAL CELLS 1715: NORMAL /HPF (ref 0–5)
ERYTHROCYTE [DISTWIDTH] IN BLOOD BY AUTOMATED COUNT: 54.4 FL (ref 35.9–50)
GLUCOSE UR STRIP.AUTO-MCNC: 250 MG/DL
HCT VFR BLD AUTO: 30.8 % (ref 42–52)
HGB BLD-MCNC: 9.9 G/DL (ref 14–18)
IMM GRANULOCYTES # BLD AUTO: 0.03 K/UL (ref 0–0.11)
IMM GRANULOCYTES NFR BLD AUTO: 0.6 % (ref 0–0.9)
KETONES UR STRIP.AUTO-MCNC: NEGATIVE MG/DL
LEUKOCYTE ESTERASE UR QL STRIP.AUTO: NEGATIVE
LYMPHOCYTES # BLD AUTO: 1.13 K/UL (ref 1–4.8)
LYMPHOCYTES NFR BLD: 23.6 % (ref 22–41)
MCH RBC QN AUTO: 32.8 PG (ref 27–33)
MCHC RBC AUTO-ENTMCNC: 32.1 G/DL (ref 32.3–36.5)
MCV RBC AUTO: 102 FL (ref 81.4–97.8)
MICRO URNS: ABNORMAL
MONOCYTES # BLD AUTO: 0.47 K/UL (ref 0–0.85)
MONOCYTES NFR BLD AUTO: 9.8 % (ref 0–13.4)
NEUTROPHILS # BLD AUTO: 3.06 K/UL (ref 1.82–7.42)
NEUTROPHILS NFR BLD: 64.1 % (ref 44–72)
NITRITE UR QL STRIP.AUTO: NEGATIVE
NRBC # BLD AUTO: 0 K/UL
NRBC BLD-RTO: 0 /100 WBC (ref 0–0.2)
PH UR STRIP.AUTO: 6 [PH] (ref 5–8)
PLATELET # BLD AUTO: 220 K/UL (ref 164–446)
PMV BLD AUTO: 9.6 FL (ref 9–12.9)
PROT UR QL STRIP: 30 MG/DL
RBC # BLD AUTO: 3.02 M/UL (ref 4.7–6.1)
RBC # URNS HPF: NORMAL /HPF (ref 0–2)
RBC UR QL AUTO: NEGATIVE
SP GR UR STRIP.AUTO: 1.01
UROBILINOGEN UR STRIP.AUTO-MCNC: 0.2 EU/DL
WBC # BLD AUTO: 4.8 K/UL (ref 4.8–10.8)
WBC #/AREA URNS HPF: NORMAL /HPF

## 2025-01-02 PROCEDURE — 82570 ASSAY OF URINE CREATININE: CPT

## 2025-01-02 PROCEDURE — 83540 ASSAY OF IRON: CPT

## 2025-01-02 PROCEDURE — 80069 RENAL FUNCTION PANEL: CPT

## 2025-01-02 PROCEDURE — 85025 COMPLETE CBC W/AUTO DIFF WBC: CPT

## 2025-01-02 PROCEDURE — 36415 COLL VENOUS BLD VENIPUNCTURE: CPT

## 2025-01-02 PROCEDURE — 82728 ASSAY OF FERRITIN: CPT

## 2025-01-02 PROCEDURE — 82043 UR ALBUMIN QUANTITATIVE: CPT

## 2025-01-02 PROCEDURE — 83550 IRON BINDING TEST: CPT

## 2025-01-02 PROCEDURE — 81001 URINALYSIS AUTO W/SCOPE: CPT

## 2025-01-02 PROCEDURE — 83970 ASSAY OF PARATHORMONE: CPT

## 2025-01-03 LAB
ALBUMIN SERPL BCP-MCNC: 4.1 G/DL (ref 3.2–4.9)
BUN SERPL-MCNC: 54 MG/DL (ref 8–22)
CALCIUM ALBUM COR SERPL-MCNC: 8.7 MG/DL (ref 8.5–10.5)
CALCIUM SERPL-MCNC: 8.8 MG/DL (ref 8.5–10.5)
CHLORIDE SERPL-SCNC: 111 MMOL/L (ref 96–112)
CO2 SERPL-SCNC: 20 MMOL/L (ref 20–33)
CREAT SERPL-MCNC: 3.5 MG/DL (ref 0.5–1.4)
CREAT UR-MCNC: 60.47 MG/DL
CREAT UR-MCNC: 62.2 MG/DL
FERRITIN SERPL-MCNC: 462 NG/ML (ref 22–322)
GFR SERPLBLD CREATININE-BSD FMLA CKD-EPI: 16 ML/MIN/1.73 M 2
GLUCOSE SERPL-MCNC: 97 MG/DL (ref 65–99)
IRON SATN MFR SERPL: 35 % (ref 15–55)
IRON SERPL-MCNC: 98 UG/DL (ref 50–180)
MICROALBUMIN UR-MCNC: 14.2 MG/DL
MICROALBUMIN/CREAT UR: 235 MG/G (ref 0–30)
PHOSPHATE SERPL-MCNC: 3.5 MG/DL (ref 2.5–4.5)
POTASSIUM SERPL-SCNC: 4.6 MMOL/L (ref 3.6–5.5)
PTH-INTACT SERPL-MCNC: 82 PG/ML (ref 14–72)
SODIUM SERPL-SCNC: 143 MMOL/L (ref 135–145)
TIBC SERPL-MCNC: 282 UG/DL (ref 250–450)
UIBC SERPL-MCNC: 184 UG/DL (ref 110–370)

## 2025-02-27 ENCOUNTER — HOSPITAL ENCOUNTER (OUTPATIENT)
Dept: LAB | Facility: MEDICAL CENTER | Age: 85
End: 2025-02-27
Attending: INTERNAL MEDICINE
Payer: MEDICARE

## 2025-02-27 LAB
25(OH)D3 SERPL-MCNC: 64 NG/ML (ref 30–100)
ALBUMIN SERPL BCP-MCNC: 4 G/DL (ref 3.2–4.9)
ALBUMIN SERPL BCP-MCNC: 4 G/DL (ref 3.2–4.9)
ALBUMIN/GLOB SERPL: 1.7 G/DL
ALP SERPL-CCNC: 89 U/L (ref 30–99)
ALT SERPL-CCNC: 25 U/L (ref 2–50)
ANION GAP SERPL CALC-SCNC: 12 MMOL/L (ref 7–16)
APPEARANCE UR: CLEAR
AST SERPL-CCNC: 22 U/L (ref 12–45)
BACTERIA #/AREA URNS HPF: ABNORMAL /HPF
BASOPHILS # BLD AUTO: 0.4 % (ref 0–1.8)
BASOPHILS # BLD: 0.02 K/UL (ref 0–0.12)
BILIRUB SERPL-MCNC: 0.3 MG/DL (ref 0.1–1.5)
BILIRUB UR QL STRIP.AUTO: NEGATIVE
BUN SERPL-MCNC: 53 MG/DL (ref 8–22)
BUN SERPL-MCNC: 53 MG/DL (ref 8–22)
CALCIUM ALBUM COR SERPL-MCNC: 9.3 MG/DL (ref 8.5–10.5)
CALCIUM ALBUM COR SERPL-MCNC: 9.5 MG/DL (ref 8.5–10.5)
CALCIUM SERPL-MCNC: 9.3 MG/DL (ref 8.5–10.5)
CALCIUM SERPL-MCNC: 9.5 MG/DL (ref 8.5–10.5)
CASTS URNS QL MICRO: ABNORMAL /LPF (ref 0–2)
CHLORIDE SERPL-SCNC: 112 MMOL/L (ref 96–112)
CHLORIDE SERPL-SCNC: 112 MMOL/L (ref 96–112)
CO2 SERPL-SCNC: 21 MMOL/L (ref 20–33)
CO2 SERPL-SCNC: 21 MMOL/L (ref 20–33)
COLOR UR: YELLOW
CREAT SERPL-MCNC: 3.17 MG/DL (ref 0.5–1.4)
CREAT SERPL-MCNC: 3.18 MG/DL (ref 0.5–1.4)
CREAT UR-MCNC: 76.1 MG/DL
CREAT UR-MCNC: 76.5 MG/DL
EOSINOPHIL # BLD AUTO: 0.08 K/UL (ref 0–0.51)
EOSINOPHIL NFR BLD: 1.4 % (ref 0–6.9)
EPITHELIAL CELLS 1715: ABNORMAL /HPF (ref 0–5)
EPITHELIAL CELLS RENAL  1715R: PRESENT /HPF
ERYTHROCYTE [DISTWIDTH] IN BLOOD BY AUTOMATED COUNT: 53.1 FL (ref 35.9–50)
GFR SERPLBLD CREATININE-BSD FMLA CKD-EPI: 18 ML/MIN/1.73 M 2
GFR SERPLBLD CREATININE-BSD FMLA CKD-EPI: 19 ML/MIN/1.73 M 2
GLOBULIN SER CALC-MCNC: 2.3 G/DL (ref 1.9–3.5)
GLUCOSE SERPL-MCNC: 105 MG/DL (ref 65–99)
GLUCOSE SERPL-MCNC: 107 MG/DL (ref 65–99)
GLUCOSE UR STRIP.AUTO-MCNC: 250 MG/DL
HCT VFR BLD AUTO: 29.8 % (ref 42–52)
HGB BLD-MCNC: 9.6 G/DL (ref 14–18)
IMM GRANULOCYTES # BLD AUTO: 0.03 K/UL (ref 0–0.11)
IMM GRANULOCYTES NFR BLD AUTO: 0.5 % (ref 0–0.9)
KETONES UR STRIP.AUTO-MCNC: NEGATIVE MG/DL
LEUKOCYTE ESTERASE UR QL STRIP.AUTO: NEGATIVE
LYMPHOCYTES # BLD AUTO: 1.04 K/UL (ref 1–4.8)
LYMPHOCYTES NFR BLD: 18.8 % (ref 22–41)
MCH RBC QN AUTO: 33.1 PG (ref 27–33)
MCHC RBC AUTO-ENTMCNC: 32.2 G/DL (ref 32.3–36.5)
MCV RBC AUTO: 102.8 FL (ref 81.4–97.8)
MICRO URNS: ABNORMAL
MICROALBUMIN UR-MCNC: 18.4 MG/DL
MICROALBUMIN/CREAT UR: 242 MG/G (ref 0–30)
MONOCYTES # BLD AUTO: 0.51 K/UL (ref 0–0.85)
MONOCYTES NFR BLD AUTO: 9.2 % (ref 0–13.4)
NEUTROPHILS # BLD AUTO: 3.84 K/UL (ref 1.82–7.42)
NEUTROPHILS NFR BLD: 69.7 % (ref 44–72)
NITRITE UR QL STRIP.AUTO: NEGATIVE
NRBC # BLD AUTO: 0 K/UL
NRBC BLD-RTO: 0 /100 WBC (ref 0–0.2)
PH UR STRIP.AUTO: 6 [PH] (ref 5–8)
PHOSPHATE SERPL-MCNC: 3.8 MG/DL (ref 2.5–4.5)
PLATELET # BLD AUTO: 214 K/UL (ref 164–446)
PMV BLD AUTO: 9.8 FL (ref 9–12.9)
POTASSIUM SERPL-SCNC: 5.3 MMOL/L (ref 3.6–5.5)
POTASSIUM SERPL-SCNC: 5.4 MMOL/L (ref 3.6–5.5)
PROT SERPL-MCNC: 6.3 G/DL (ref 6–8.2)
PROT UR QL STRIP: 30 MG/DL
RBC # BLD AUTO: 2.9 M/UL (ref 4.7–6.1)
RBC # URNS HPF: ABNORMAL /HPF (ref 0–2)
RBC UR QL AUTO: NEGATIVE
SODIUM SERPL-SCNC: 145 MMOL/L (ref 135–145)
SODIUM SERPL-SCNC: 145 MMOL/L (ref 135–145)
SP GR UR STRIP.AUTO: 1.02
UROBILINOGEN UR STRIP.AUTO-MCNC: 0.2 EU/DL
WBC # BLD AUTO: 5.5 K/UL (ref 4.8–10.8)
WBC #/AREA URNS HPF: ABNORMAL /HPF

## 2025-02-27 PROCEDURE — 84165 PROTEIN E-PHORESIS SERUM: CPT

## 2025-02-27 PROCEDURE — 82306 VITAMIN D 25 HYDROXY: CPT

## 2025-02-27 PROCEDURE — 86334 IMMUNOFIX E-PHORESIS SERUM: CPT

## 2025-02-27 PROCEDURE — 80053 COMPREHEN METABOLIC PANEL: CPT

## 2025-02-27 PROCEDURE — 84155 ASSAY OF PROTEIN SERUM: CPT

## 2025-02-27 PROCEDURE — 82570 ASSAY OF URINE CREATININE: CPT

## 2025-02-27 PROCEDURE — 85025 COMPLETE CBC W/AUTO DIFF WBC: CPT | Mod: 91

## 2025-02-27 PROCEDURE — 82043 UR ALBUMIN QUANTITATIVE: CPT

## 2025-02-27 PROCEDURE — 85025 COMPLETE CBC W/AUTO DIFF WBC: CPT

## 2025-02-27 PROCEDURE — 83970 ASSAY OF PARATHORMONE: CPT

## 2025-02-27 PROCEDURE — 82784 ASSAY IGA/IGD/IGG/IGM EACH: CPT

## 2025-02-27 PROCEDURE — 83521 IG LIGHT CHAINS FREE EACH: CPT | Mod: 91

## 2025-02-27 PROCEDURE — 36415 COLL VENOUS BLD VENIPUNCTURE: CPT

## 2025-02-27 PROCEDURE — 81001 URINALYSIS AUTO W/SCOPE: CPT

## 2025-02-27 PROCEDURE — 80069 RENAL FUNCTION PANEL: CPT

## 2025-02-28 ENCOUNTER — APPOINTMENT (OUTPATIENT)
Dept: RADIOLOGY | Facility: MEDICAL CENTER | Age: 85
End: 2025-02-28
Attending: EMERGENCY MEDICINE
Payer: MEDICARE

## 2025-02-28 ENCOUNTER — HOSPITAL ENCOUNTER (EMERGENCY)
Facility: MEDICAL CENTER | Age: 85
End: 2025-02-28
Attending: EMERGENCY MEDICINE
Payer: MEDICARE

## 2025-02-28 VITALS
SYSTOLIC BLOOD PRESSURE: 148 MMHG | HEART RATE: 63 BPM | HEIGHT: 71 IN | BODY MASS INDEX: 22.16 KG/M2 | TEMPERATURE: 97.2 F | RESPIRATION RATE: 18 BRPM | DIASTOLIC BLOOD PRESSURE: 70 MMHG | WEIGHT: 158.29 LBS | OXYGEN SATURATION: 96 %

## 2025-02-28 DIAGNOSIS — K43.9 VENTRAL HERNIA WITHOUT OBSTRUCTION OR GANGRENE: ICD-10-CM

## 2025-02-28 DIAGNOSIS — K59.00 CONSTIPATION, UNSPECIFIED CONSTIPATION TYPE: ICD-10-CM

## 2025-02-28 DIAGNOSIS — K80.20 GALLSTONES: ICD-10-CM

## 2025-02-28 LAB
ALBUMIN SERPL BCP-MCNC: 4.1 G/DL (ref 3.2–4.9)
ALBUMIN/GLOB SERPL: 2 G/DL
ALP SERPL-CCNC: 79 U/L (ref 30–99)
ALT SERPL-CCNC: 22 U/L (ref 2–50)
ANION GAP SERPL CALC-SCNC: 16 MMOL/L (ref 7–16)
AST SERPL-CCNC: 20 U/L (ref 12–45)
BASOPHILS # BLD AUTO: 0.2 % (ref 0–1.8)
BASOPHILS # BLD AUTO: 0.4 % (ref 0–1.8)
BASOPHILS # BLD: 0.01 K/UL (ref 0–0.12)
BASOPHILS # BLD: 0.04 K/UL (ref 0–0.12)
BILIRUB SERPL-MCNC: 0.4 MG/DL (ref 0.1–1.5)
BUN SERPL-MCNC: 49 MG/DL (ref 8–22)
CALCIUM ALBUM COR SERPL-MCNC: 8.9 MG/DL (ref 8.5–10.5)
CALCIUM SERPL-MCNC: 9 MG/DL (ref 8.4–10.2)
CHLORIDE SERPL-SCNC: 110 MMOL/L (ref 96–112)
CO2 SERPL-SCNC: 17 MMOL/L (ref 20–33)
CREAT SERPL-MCNC: 3.02 MG/DL (ref 0.5–1.4)
EOSINOPHIL # BLD AUTO: 0.02 K/UL (ref 0–0.51)
EOSINOPHIL # BLD AUTO: 0.07 K/UL (ref 0–0.51)
EOSINOPHIL NFR BLD: 0.2 % (ref 0–6.9)
EOSINOPHIL NFR BLD: 1.3 % (ref 0–6.9)
ERYTHROCYTE [DISTWIDTH] IN BLOOD BY AUTOMATED COUNT: 52.7 FL (ref 35.9–50)
ERYTHROCYTE [DISTWIDTH] IN BLOOD BY AUTOMATED COUNT: 54 FL (ref 35.9–50)
GFR SERPLBLD CREATININE-BSD FMLA CKD-EPI: 20 ML/MIN/1.73 M 2
GLOBULIN SER CALC-MCNC: 2.1 G/DL (ref 1.9–3.5)
GLUCOSE SERPL-MCNC: 118 MG/DL (ref 65–99)
HCT VFR BLD AUTO: 30 % (ref 42–52)
HCT VFR BLD AUTO: 30.7 % (ref 42–52)
HGB BLD-MCNC: 9.6 G/DL (ref 14–18)
HGB BLD-MCNC: 9.7 G/DL (ref 14–18)
IMM GRANULOCYTES # BLD AUTO: 0.02 K/UL (ref 0–0.11)
IMM GRANULOCYTES # BLD AUTO: 0.06 K/UL (ref 0–0.11)
IMM GRANULOCYTES NFR BLD AUTO: 0.4 % (ref 0–0.9)
IMM GRANULOCYTES NFR BLD AUTO: 0.6 % (ref 0–0.9)
LACTATE SERPL-SCNC: 1.4 MMOL/L (ref 0.5–2)
LYMPHOCYTES # BLD AUTO: 0.72 K/UL (ref 1–4.8)
LYMPHOCYTES # BLD AUTO: 1.08 K/UL (ref 1–4.8)
LYMPHOCYTES NFR BLD: 19.6 % (ref 22–41)
LYMPHOCYTES NFR BLD: 6.8 % (ref 22–41)
MCH RBC QN AUTO: 33.1 PG (ref 27–33)
MCH RBC QN AUTO: 33.3 PG (ref 27–33)
MCHC RBC AUTO-ENTMCNC: 31.6 G/DL (ref 32.3–36.5)
MCHC RBC AUTO-ENTMCNC: 32 G/DL (ref 32.3–36.5)
MCV RBC AUTO: 104.2 FL (ref 81.4–97.8)
MCV RBC AUTO: 104.8 FL (ref 81.4–97.8)
MONOCYTES # BLD AUTO: 0.5 K/UL (ref 0–0.85)
MONOCYTES # BLD AUTO: 0.69 K/UL (ref 0–0.85)
MONOCYTES NFR BLD AUTO: 6.5 % (ref 0–13.4)
MONOCYTES NFR BLD AUTO: 9.1 % (ref 0–13.4)
NEUTROPHILS # BLD AUTO: 3.82 K/UL (ref 1.82–7.42)
NEUTROPHILS # BLD AUTO: 9.01 K/UL (ref 1.82–7.42)
NEUTROPHILS NFR BLD: 69.4 % (ref 44–72)
NEUTROPHILS NFR BLD: 85.5 % (ref 44–72)
NRBC # BLD AUTO: 0 K/UL
NRBC # BLD AUTO: 0 K/UL
NRBC BLD-RTO: 0 /100 WBC (ref 0–0.2)
NRBC BLD-RTO: 0 /100 WBC (ref 0–0.2)
PLATELET # BLD AUTO: 202 K/UL (ref 164–446)
PLATELET # BLD AUTO: 217 K/UL (ref 164–446)
PMV BLD AUTO: 10.1 FL (ref 9–12.9)
PMV BLD AUTO: 9.6 FL (ref 9–12.9)
POTASSIUM SERPL-SCNC: 4.5 MMOL/L (ref 3.6–5.5)
PROT SERPL-MCNC: 6.2 G/DL (ref 6–8.2)
PTH-INTACT SERPL-MCNC: 78.2 PG/ML (ref 14–72)
RBC # BLD AUTO: 2.88 M/UL (ref 4.7–6.1)
RBC # BLD AUTO: 2.93 M/UL (ref 4.7–6.1)
SODIUM SERPL-SCNC: 143 MMOL/L (ref 135–145)
WBC # BLD AUTO: 10.5 K/UL (ref 4.8–10.8)
WBC # BLD AUTO: 5.5 K/UL (ref 4.8–10.8)

## 2025-02-28 PROCEDURE — 74176 CT ABD & PELVIS W/O CONTRAST: CPT

## 2025-02-28 PROCEDURE — 83605 ASSAY OF LACTIC ACID: CPT

## 2025-02-28 PROCEDURE — 85025 COMPLETE CBC W/AUTO DIFF WBC: CPT

## 2025-02-28 PROCEDURE — 99284 EMERGENCY DEPT VISIT MOD MDM: CPT | Mod: 25

## 2025-02-28 PROCEDURE — 36415 COLL VENOUS BLD VENIPUNCTURE: CPT

## 2025-02-28 PROCEDURE — 80053 COMPREHEN METABOLIC PANEL: CPT

## 2025-02-28 RX ORDER — MORPHINE SULFATE 4 MG/ML
4 INJECTION INTRAVENOUS ONCE
Status: DISCONTINUED | OUTPATIENT
Start: 2025-02-28 | End: 2025-02-28 | Stop reason: HOSPADM

## 2025-02-28 ASSESSMENT — FIBROSIS 4 INDEX: FIB4 SCORE: 1.7

## 2025-02-28 NOTE — ED NOTES
Patient states that they have been constipated and has not been able to pass a bowel movement for the last three days. Patient states that they have been feeling a little nauseas as well. Patient denies any other symptoms.

## 2025-02-28 NOTE — ED NOTES
"Upon return from CT, patient had a large BM, states \"a very large plug came out of me\"  Pt now on gurney, will line and lab patient.    "

## 2025-02-28 NOTE — ED PROVIDER NOTES
ED Provider Note    Primary care provider: Isis Diaz M.D.  Means of arrival: Private vehicle  History obtained from: Patient  History limited by: None    CHIEF COMPLAINT  Chief Complaint   Patient presents with    Constipation     Patient states that they have been constipated and has not been able to pass a bowel movement for the last three days. Patient states that they have been feeling a little nauseas as well. Patient denies any other symptoms.         HPI/ROS  Joshua Cross is a 84 y.o. male who presents to the Emergency Department for evaluation of constipation.  Patient reports for the past 3 days he has felt constipated and has not been able to have a bowel movement.  He has been on stool softeners without relief.  Last night he used MiraLAX and this morning tried an enema again without relief.  He reports associated nausea.  He does have a remote history of bowel obstruction status-post resection of his small bowel.  Has also had history of ventral hernia.  He also reports a history of Crohn's disease for which he is on Stelara.  He denies fevers, chills, chest pain or shortness of breath.  He reports that he is now having pain in his rectal area as well as his abdomen which is why he ultimately came in this morning.  He notes that he took an  hydrocodone that he had from years ago this morning prior to arrival which is slightly helped with his pain.    EXTERNAL RECORDS REVIEWED  Per record review patient has had ventral hernia repair in the abdomen.    Patient has a history of chronic kidney disease, atrial fibrillation on Eliquis, Crohn's.  His baseline GFR is around 20.  Last had a biopsy for which she was hospitalized in 2024 which was consistent with hypertension nephrosclerosis.    Last outpatient nephrology visit was on 2025 and at that time dialysis discussions were initiated.    LIMITATION TO HISTORY   None      PAST MEDICAL HISTORY   has a past medical history of  Alcohol use (), Anemia, Arrhythmia, Arthritis, Back pain (), Bowel habit changes, Cancer (HCC), Cataract (), Cholesterol blood decreased, Crohn's disease (), Dental disorder, Heart burn, Heart murmur, Hepatitis A (), High cholesterol, Hypertension, Indigestion (), Liver abscess (), Pneumonia (), Renal disorder (), Seizure (), Skin cancer, Stroke (HCC) (2023), and Umbilical hernia.    SURGICAL HISTORY   has a past surgical history that includes DX-DRAIN-LIVER ABSCESS-CYST (2011); resect small intest,each addnl (2012); tonsillectomy (1949); ventral hernia repair laparoscopic (2014); bone marrow aspiration (Left, 2019); bone marrow biopsy, ndl/trocar (Left, 2019); shldr arthroscop,surg,w/rotat cuff repr (Left, 2023); shldr arthroscop part debride 1-2 (Left, 2023); shldr arthroscop,part acromioplas (Left, 2023); repair biceps long tendon (Left, 2023); other abdominal surgery (2012); and other (2014).    SOCIAL HISTORY  Social History     Tobacco Use    Smoking status: Former     Current packs/day: 0.00     Average packs/day: 1 pack/day for 10.0 years (10.0 ttl pk-yrs)     Types: Cigarettes     Start date: 1962     Quit date: 1972     Years since quittin.8    Smokeless tobacco: Never   Vaping Use    Vaping status: Never Used   Substance Use Topics    Alcohol use: Not Currently     Comment: stopped alcohol 2023    Drug use: Never      Social History     Substance and Sexual Activity   Drug Use Never       FAMILY HISTORY  Family History   Problem Relation Age of Onset    Dementia Mother     Heart Disease Father     Stroke Father     Heart Disease Brother        CURRENT MEDICATIONS  Home Medications    **Home medications have not yet been reviewed for this encounter**       Audit from Redirected Encounters    **Home medications have not yet been reviewed for this encounter**         ALLERGIES  No  "Known Allergies    PHYSICAL EXAM  VITAL SIGNS: BP (!) 157/75   Pulse 60   Temp 36.2 °C (97.2 °F) (Temporal)   Resp 18   Ht 1.803 m (5' 11\")   Wt 71.8 kg (158 lb 4.6 oz)   SpO2 95%   BMI 22.08 kg/m²   Vitals reviewed by myself.  Physical Exam  Nursing note and vitals reviewed.  Constitutional: Well-developed and well-nourished.  Patient appears uncomfortable  HENT: Head is normocephalic and atraumatic.  Eyes: extra-ocular movements intact  Cardiovascular: regular rate and  regular rhythm. No murmur heard.  Pulmonary/Chest: Breath sounds normal. No wheezes or rales.   Abdominal: Soft and non-tender. No distention.    Rectal: Rectal exam done after large bowel movement, rectal vault is empty on exam  Musculoskeletal: Extremities exhibit normal range of motion without edema or tenderness.   Neurological: Awake and alert  Skin: Skin is warm and dry. No rash.         DIAGNOSTIC STUDIES:  LABS  Labs Reviewed   CBC WITH DIFFERENTIAL - Abnormal; Notable for the following components:       Result Value    RBC 2.93 (*)     Hemoglobin 9.7 (*)     Hematocrit 30.7 (*)     .8 (*)     MCH 33.1 (*)     MCHC 31.6 (*)     RDW 52.7 (*)     Neutrophils-Polys 85.50 (*)     Lymphocytes 6.80 (*)     Neutrophils (Absolute) 9.01 (*)     Lymphs (Absolute) 0.72 (*)     All other components within normal limits   COMP METABOLIC PANEL - Abnormal; Notable for the following components:    Co2 17 (*)     Glucose 118 (*)     Bun 49 (*)     Creatinine 3.02 (*)     All other components within normal limits   ESTIMATED GFR - Abnormal; Notable for the following components:    GFR (CKD-EPI) 20 (*)     All other components within normal limits   LACTIC ACID       All labs reviewed and independently interpreted by myself      RADIOLOGY  Final interpretation by radiology demonstrates:    CT-ABDOMEN-PELVIS W/O   Final Result      1.  No evidence of bowel obstruction or focal inflammatory change.      2.  Moderate constipation.      3.  Colonic " diverticulosis.      4.  Multiple lamellated round large calcifications within small intestine measuring up to 2.5 cm in size consistent with ingested material versus gallstones passed into bowel.      5.  Gallstones within the gallbladder.      6.  Bilateral renal cysts one of which on the right is complex in nature and others of which are simple cystic in nature, unchanged from comparison.      7.  Small fat-containing ventral hernia.        The radiologist's interpretation of all radiological studies have been reviewed by me.      COURSE & MEDICAL DECISION MAKING      INITIAL ASSESSMENT, ED COURSE AND PLAN    Patient is an 84-year-old male who presents for evaluation of constipation.  Differential diagnosis includes bowel obstruction, constipation, Crohn's flare, colitis, diverticulitis.  Diagnostic workup includes labs and CT of the abdomen.  Given his chronic kidney disease will have to obtain CT without contrast.    Patient's initial vitals notable for slight hypertension, he appears uncomfortable on exam.  Patient was taken to CT and when returning from CT he had a large bowel movement and felt greatly improved.  CT returns and demonstrates moderate constipation however this was prior to his bowel movement.  I performed a rectal exam after he returned from CT and there is no residual stool in the rectal vault.  Labs returned and renal function is at its baseline with GFR of 20.  CT was notable for gallstones with round calcifications in the small intestine measuring up to 2.5 cm, patient advised that this could be gallstones.  As he is not having any gallbladder discomfort and LFTs are within normal limits low suspicion for acute biliary pathology.  He is advised this can be followed up outpatient.  He was also noted to have a small fat-containing ventral hernia which can also be followed up outpatient.  His patient is feeling improved he is advised to continue with daily stool softener and MiraLAX and given  strict return precautions.  I have placed general surgery referral for follow-up.  He is then given strict return precautions and discharged in stable condition.      DISPOSITION AND DISCUSSIONS  I have discussed management of the patient with the following physicians and ALBERT's:  none    Discussion of management with other QHP or appropriate source(s): none     Escalation of care considered, and ultimately not performed:see above    Barriers to care at this time, including but not limited to: none.     Decision tools and prescription drugs considered including, but not limited to: see above.        FINAL IMPRESSION  1. Constipation, unspecified constipation type    2. Ventral hernia without obstruction or gangrene    3. Gallstones

## 2025-02-28 NOTE — ED TRIAGE NOTES
"Chief Complaint   Patient presents with    Constipation     Patient states that they have been constipated and has not been able to pass a bowel movement for the last three days. Patient states that they have been feeling a little nauseas as well. Patient denies any other symptoms.       BP (!) 157/75   Pulse 60   Temp 36.2 °C (97.2 °F) (Temporal)   Resp 18   Ht 1.803 m (5' 11\")   Wt 71.8 kg (158 lb 4.6 oz)   SpO2 95%   BMI 22.08 kg/m²     "

## 2025-03-03 LAB
ALBUMIN SERPL ELPH-MCNC: 3.71 G/DL (ref 3.75–5.01)
ALPHA1 GLOB SERPL ELPH-MCNC: 0.36 G/DL (ref 0.19–0.46)
ALPHA2 GLOB SERPL ELPH-MCNC: 0.83 G/DL (ref 0.48–1.05)
B-GLOBULIN SERPL ELPH-MCNC: 0.6 G/DL (ref 0.48–1.1)
EER MONOCLONAL PROTEIN AND FLC, SERUM Q5224: ABNORMAL
GAMMA GLOB SERPL ELPH-MCNC: 0.4 G/DL (ref 0.62–1.51)
IGA SERPL-MCNC: 43 MG/DL (ref 68–408)
IGG SERPL-MCNC: 297 MG/DL (ref 768–1632)
IGM SERPL-MCNC: 195 MG/DL (ref 35–263)
INTERPRETATION SERPL IFE-IMP: ABNORMAL
INTERPRETATION SERPL IFE-IMP: ABNORMAL
KAPPA LC FREE SER-MCNC: 126.11 MG/L (ref 3.3–19.4)
KAPPA LC FREE/LAMBDA FREE SER NEPH: 4.41 {RATIO} (ref 0.26–1.65)
LAMBDA LC FREE SERPL-MCNC: 28.6 MG/L (ref 5.71–26.3)
MONOCLONAL PROTEIN NL11656: ABNORMAL G/DL
PROT SERPL-MCNC: 5.9 G/DL (ref 6.3–8.2)

## 2025-03-07 NOTE — Clinical Note
REFERRAL APPROVAL NOTICE         Sent on March 7, 2025                   Sonny Cross  [address] [address] [facility] [place] [address]                   Dear Mr. Cross,    After a careful review of the medical information and benefit coverage, [facility] has processed your referral. See below for additional details.    If applicable, you must be actively enrolled with your insurance for coverage of the authorized service. If you have any questions regarding your coverage, please contact your insurance directly.    REFERRAL INFORMATION   Referral #:  [id number]  Referred-To Provider    Referred-By Provider:  Surgery    Sachi Burnette M.D.   [facility]      [address] [facility] - Emergency Room  [address]  [facility] [address]  [phone] [id number]  [place] [address]  [phone]    Referral Start Date:  02/28/2025  Referral End Date:   02/28/2026             SCHEDULING  If you do not already have an appointment, please call [phone] to make an appointment.     MORE INFORMATION  If you do not already have a [employer] account, sign up at: [email or web address]  You can access your medical information, make appointments, see lab results, billing information, and more.  If you have questions regarding this referral, please contact  the [facility] Referrals department at:             [phone]. Monday - Friday 8:00AM - 5:00PM.     Sincerely,    [facility]

## 2025-05-02 ENCOUNTER — APPOINTMENT (OUTPATIENT)
Dept: ADMISSIONS | Facility: MEDICAL CENTER | Age: 85
End: 2025-05-02
Attending: STUDENT IN AN ORGANIZED HEALTH CARE EDUCATION/TRAINING PROGRAM
Payer: MEDICARE

## 2025-05-05 ENCOUNTER — PRE-ADMISSION TESTING (OUTPATIENT)
Dept: ADMISSIONS | Facility: MEDICAL CENTER | Age: 85
End: 2025-05-05
Attending: STUDENT IN AN ORGANIZED HEALTH CARE EDUCATION/TRAINING PROGRAM
Payer: MEDICARE

## 2025-05-05 VITALS — BODY MASS INDEX: 22.08 KG/M2 | HEIGHT: 71 IN

## 2025-05-05 NOTE — PREADMIT AVS NOTE
Current Medications   Medication Instructions    Cyanocobalamin (VITAMIN B-12 PO) Stop 7 days before surgery    amLODIPine (NORVASC) 2.5 MG Tab Continue taking medication as prescribed, including morning of procedure     famotidine (PEPCID) 20 MG Tab Continue taking medication as prescribed, including morning of procedure     Calcium Carbonate-Vit D-Min (CALCIUM 1200 PO) Stop 7 days before surgery    FARXIGA 10 MG Tab Follow instructions from surgeon or specialist.    apixaban (ELIQUIS) 5mg Tab Follow instructions from surgeon or specialist.    acetaminophen (TYLENOL 8 HOUR ARTHRITIS PAIN) 650 MG CR tablet As needed medication, may take if needed, including morning of procedure     Multiple Vitamin (MULTI-VITAMIN DAILY PO) Stop 7 days before surgery    Ustekinumab (STELARA) 90 MG/ML Solution Prefilled Syringe Follow instructions from surgeon or specialist.    VITAMIN D PO Stop 7 days before surgery    losartan (COZAAR) 25 MG Tab Stop 24 hours before surgery    simvastatin (ZOCOR) 40 MG TABS Continue taking as prescribed.

## 2025-05-05 NOTE — PREPROCEDURE INSTRUCTIONS
PreAdmit Telephone Appointment: Reviewed the Preparing for your procedure handout with patient over the phone. Patient instructed per pharmacy guidelines regarding taking, holding or contacting provider for instructions on regularly prescribed medications before surgery.     Confirmed with patient where to check in morning of surgery. AVS sent to patient's My Chart.

## 2025-05-19 ENCOUNTER — HOSPITAL ENCOUNTER (OUTPATIENT)
Dept: LAB | Facility: MEDICAL CENTER | Age: 85
End: 2025-05-19
Attending: INTERNAL MEDICINE
Payer: MEDICARE

## 2025-05-19 LAB
APPEARANCE UR: CLEAR
BACTERIA #/AREA URNS HPF: NORMAL /HPF
BASOPHILS # BLD AUTO: 0.3 % (ref 0–1.8)
BASOPHILS # BLD AUTO: 0.5 % (ref 0–1.8)
BASOPHILS # BLD: 0.02 K/UL (ref 0–0.12)
BASOPHILS # BLD: 0.03 K/UL (ref 0–0.12)
BILIRUB UR QL STRIP.AUTO: NEGATIVE
CASTS URNS QL MICRO: NORMAL /LPF (ref 0–2)
COLOR UR: YELLOW
CREAT UR-MCNC: 49.3 MG/DL
CREAT UR-MCNC: 49.6 MG/DL
EOSINOPHIL # BLD AUTO: 0.02 K/UL (ref 0–0.51)
EOSINOPHIL # BLD AUTO: 0.05 K/UL (ref 0–0.51)
EOSINOPHIL NFR BLD: 0.3 % (ref 0–6.9)
EOSINOPHIL NFR BLD: 0.8 % (ref 0–6.9)
EPITHELIAL CELLS 1715: NORMAL /HPF (ref 0–5)
ERYTHROCYTE [DISTWIDTH] IN BLOOD BY AUTOMATED COUNT: 50.4 FL (ref 35.9–50)
ERYTHROCYTE [DISTWIDTH] IN BLOOD BY AUTOMATED COUNT: 50.5 FL (ref 35.9–50)
GLUCOSE UR STRIP.AUTO-MCNC: 250 MG/DL
HCT VFR BLD AUTO: 32.3 % (ref 42–52)
HCT VFR BLD AUTO: 32.3 % (ref 42–52)
HGB BLD-MCNC: 10.2 G/DL (ref 14–18)
HGB BLD-MCNC: 10.2 G/DL (ref 14–18)
IMM GRANULOCYTES # BLD AUTO: 0.03 K/UL (ref 0–0.11)
IMM GRANULOCYTES # BLD AUTO: 0.03 K/UL (ref 0–0.11)
IMM GRANULOCYTES NFR BLD AUTO: 0.5 % (ref 0–0.9)
IMM GRANULOCYTES NFR BLD AUTO: 0.5 % (ref 0–0.9)
KETONES UR STRIP.AUTO-MCNC: NEGATIVE MG/DL
LEUKOCYTE ESTERASE UR QL STRIP.AUTO: NEGATIVE
LYMPHOCYTES # BLD AUTO: 1.08 K/UL (ref 1–4.8)
LYMPHOCYTES # BLD AUTO: 1.08 K/UL (ref 1–4.8)
LYMPHOCYTES NFR BLD: 16.8 % (ref 22–41)
LYMPHOCYTES NFR BLD: 16.9 % (ref 22–41)
MCH RBC QN AUTO: 33 PG (ref 27–33)
MCH RBC QN AUTO: 33 PG (ref 27–33)
MCHC RBC AUTO-ENTMCNC: 31.6 G/DL (ref 32.3–36.5)
MCHC RBC AUTO-ENTMCNC: 31.6 G/DL (ref 32.3–36.5)
MCV RBC AUTO: 104.5 FL (ref 81.4–97.8)
MCV RBC AUTO: 104.5 FL (ref 81.4–97.8)
MICRO URNS: ABNORMAL
MICROALBUMIN UR-MCNC: 18.1 MG/DL
MICROALBUMIN/CREAT UR: 365 MG/G (ref 0–30)
MONOCYTES # BLD AUTO: 0.5 K/UL (ref 0–0.85)
MONOCYTES # BLD AUTO: 0.5 K/UL (ref 0–0.85)
MONOCYTES NFR BLD AUTO: 7.8 % (ref 0–13.4)
MONOCYTES NFR BLD AUTO: 7.8 % (ref 0–13.4)
NEUTROPHILS # BLD AUTO: 4.72 K/UL (ref 1.82–7.42)
NEUTROPHILS # BLD AUTO: 4.75 K/UL (ref 1.82–7.42)
NEUTROPHILS NFR BLD: 73.7 % (ref 44–72)
NEUTROPHILS NFR BLD: 74.1 % (ref 44–72)
NITRITE UR QL STRIP.AUTO: NEGATIVE
NRBC # BLD AUTO: 0 K/UL
NRBC # BLD AUTO: 0 K/UL
NRBC BLD-RTO: 0 /100 WBC (ref 0–0.2)
NRBC BLD-RTO: 0 /100 WBC (ref 0–0.2)
PH UR STRIP.AUTO: 7.5 [PH] (ref 5–8)
PLATELET # BLD AUTO: 240 K/UL (ref 164–446)
PLATELET # BLD AUTO: 243 K/UL (ref 164–446)
PMV BLD AUTO: 10.2 FL (ref 9–12.9)
PMV BLD AUTO: 9.9 FL (ref 9–12.9)
PROT UR QL STRIP: 30 MG/DL
RBC # BLD AUTO: 3.09 M/UL (ref 4.7–6.1)
RBC # BLD AUTO: 3.09 M/UL (ref 4.7–6.1)
RBC # URNS HPF: NORMAL /HPF (ref 0–2)
RBC UR QL AUTO: NEGATIVE
SP GR UR STRIP.AUTO: 1.01
UROBILINOGEN UR STRIP.AUTO-MCNC: 0.2 EU/DL
WBC # BLD AUTO: 6.4 K/UL (ref 4.8–10.8)
WBC # BLD AUTO: 6.4 K/UL (ref 4.8–10.8)
WBC #/AREA URNS HPF: NORMAL /HPF

## 2025-05-19 PROCEDURE — 83550 IRON BINDING TEST: CPT

## 2025-05-19 PROCEDURE — 82784 ASSAY IGA/IGD/IGG/IGM EACH: CPT

## 2025-05-19 PROCEDURE — 83540 ASSAY OF IRON: CPT

## 2025-05-19 PROCEDURE — 82570 ASSAY OF URINE CREATININE: CPT

## 2025-05-19 PROCEDURE — 84155 ASSAY OF PROTEIN SERUM: CPT

## 2025-05-19 PROCEDURE — 84165 PROTEIN E-PHORESIS SERUM: CPT

## 2025-05-19 PROCEDURE — 81001 URINALYSIS AUTO W/SCOPE: CPT

## 2025-05-19 PROCEDURE — 83521 IG LIGHT CHAINS FREE EACH: CPT

## 2025-05-19 PROCEDURE — 85025 COMPLETE CBC W/AUTO DIFF WBC: CPT

## 2025-05-19 PROCEDURE — 85025 COMPLETE CBC W/AUTO DIFF WBC: CPT | Mod: 91

## 2025-05-19 PROCEDURE — 80053 COMPREHEN METABOLIC PANEL: CPT

## 2025-05-19 PROCEDURE — 80069 RENAL FUNCTION PANEL: CPT

## 2025-05-19 PROCEDURE — 86334 IMMUNOFIX E-PHORESIS SERUM: CPT

## 2025-05-19 PROCEDURE — 36415 COLL VENOUS BLD VENIPUNCTURE: CPT

## 2025-05-19 PROCEDURE — 82728 ASSAY OF FERRITIN: CPT

## 2025-05-19 PROCEDURE — 82043 UR ALBUMIN QUANTITATIVE: CPT

## 2025-05-20 ENCOUNTER — APPOINTMENT (OUTPATIENT)
Dept: ADMISSIONS | Facility: MEDICAL CENTER | Age: 85
End: 2025-05-20
Attending: STUDENT IN AN ORGANIZED HEALTH CARE EDUCATION/TRAINING PROGRAM
Payer: MEDICARE

## 2025-05-20 DIAGNOSIS — Z01.810 PRE-OPERATIVE CARDIOVASCULAR EXAMINATION: Primary | ICD-10-CM

## 2025-05-20 LAB
ALBUMIN SERPL BCP-MCNC: 4.2 G/DL (ref 3.2–4.9)
ALBUMIN SERPL BCP-MCNC: 4.2 G/DL (ref 3.2–4.9)
ALBUMIN/GLOB SERPL: 1.7 G/DL
ALP SERPL-CCNC: 95 U/L (ref 30–99)
ALT SERPL-CCNC: 19 U/L (ref 2–50)
ANION GAP SERPL CALC-SCNC: 13 MMOL/L (ref 7–16)
ANION GAP SERPL CALC-SCNC: 14 MMOL/L (ref 7–16)
AST SERPL-CCNC: 19 U/L (ref 12–45)
BILIRUB SERPL-MCNC: 0.3 MG/DL (ref 0.1–1.5)
BUN SERPL-MCNC: 66 MG/DL (ref 8–22)
BUN SERPL-MCNC: 67 MG/DL (ref 8–22)
CALCIUM ALBUM COR SERPL-MCNC: 9.6 MG/DL (ref 8.5–10.5)
CALCIUM ALBUM COR SERPL-MCNC: 9.7 MG/DL (ref 8.5–10.5)
CALCIUM SERPL-MCNC: 9.8 MG/DL (ref 8.5–10.5)
CALCIUM SERPL-MCNC: 9.9 MG/DL (ref 8.5–10.5)
CHLORIDE SERPL-SCNC: 109 MMOL/L (ref 96–112)
CHLORIDE SERPL-SCNC: 110 MMOL/L (ref 96–112)
CO2 SERPL-SCNC: 20 MMOL/L (ref 20–33)
CO2 SERPL-SCNC: 20 MMOL/L (ref 20–33)
CREAT SERPL-MCNC: 3.57 MG/DL (ref 0.5–1.4)
CREAT SERPL-MCNC: 3.63 MG/DL (ref 0.5–1.4)
EKG IMPRESSION: NORMAL
FASTING STATUS PATIENT QL REPORTED: NORMAL
FERRITIN SERPL-MCNC: 315 NG/ML (ref 22–322)
GFR SERPLBLD CREATININE-BSD FMLA CKD-EPI: 16 ML/MIN/1.73 M 2
GFR SERPLBLD CREATININE-BSD FMLA CKD-EPI: 16 ML/MIN/1.73 M 2
GLOBULIN SER CALC-MCNC: 2.5 G/DL (ref 1.9–3.5)
GLUCOSE SERPL-MCNC: 114 MG/DL (ref 65–99)
GLUCOSE SERPL-MCNC: 115 MG/DL (ref 65–99)
IRON SATN MFR SERPL: 31 % (ref 15–55)
IRON SERPL-MCNC: 89 UG/DL (ref 50–180)
PHOSPHATE SERPL-MCNC: 4.1 MG/DL (ref 2.5–4.5)
POTASSIUM SERPL-SCNC: 5.5 MMOL/L (ref 3.6–5.5)
POTASSIUM SERPL-SCNC: 5.5 MMOL/L (ref 3.6–5.5)
PROT SERPL-MCNC: 6.7 G/DL (ref 6–8.2)
SODIUM SERPL-SCNC: 143 MMOL/L (ref 135–145)
SODIUM SERPL-SCNC: 143 MMOL/L (ref 135–145)
TIBC SERPL-MCNC: 290 UG/DL (ref 250–450)
UIBC SERPL-MCNC: 201 UG/DL (ref 110–370)

## 2025-05-20 PROCEDURE — 93010 ELECTROCARDIOGRAM REPORT: CPT | Performed by: INTERNAL MEDICINE

## 2025-05-20 PROCEDURE — 93005 ELECTROCARDIOGRAM TRACING: CPT | Mod: TC

## 2025-05-23 LAB
ALBUMIN SERPL ELPH-MCNC: 3.83 G/DL (ref 3.75–5.01)
ALPHA1 GLOB SERPL ELPH-MCNC: 0.4 G/DL (ref 0.19–0.46)
ALPHA2 GLOB SERPL ELPH-MCNC: 0.96 G/DL (ref 0.48–1.05)
B-GLOBULIN SERPL ELPH-MCNC: 0.67 G/DL (ref 0.48–1.1)
EER MONOCLONAL PROTEIN AND FLC, SERUM Q5224: ABNORMAL
GAMMA GLOB SERPL ELPH-MCNC: 0.43 G/DL (ref 0.62–1.51)
IGA SERPL-MCNC: 44 MG/DL (ref 68–408)
IGG SERPL-MCNC: 289 MG/DL (ref 768–1632)
IGM SERPL-MCNC: 239 MG/DL (ref 35–263)
INTERPRETATION SERPL IFE-IMP: ABNORMAL
INTERPRETATION SERPL IFE-IMP: ABNORMAL
KAPPA LC FREE SER-MCNC: 113.88 MG/L (ref 3.3–19.4)
KAPPA LC FREE/LAMBDA FREE SER NEPH: 4.19 {RATIO} (ref 0.26–1.65)
LAMBDA LC FREE SERPL-MCNC: 27.16 MG/L (ref 5.71–26.3)
MONOCLONAL PROTEIN NL11656: 0.17 G/DL
PROT SERPL-MCNC: 6.3 G/DL (ref 6.3–8.2)

## 2025-05-29 ENCOUNTER — ANESTHESIA EVENT (OUTPATIENT)
Dept: SURGERY | Facility: MEDICAL CENTER | Age: 85
End: 2025-05-29
Payer: MEDICARE

## 2025-05-30 ENCOUNTER — PHARMACY VISIT (OUTPATIENT)
Dept: PHARMACY | Facility: MEDICAL CENTER | Age: 85
End: 2025-05-30
Payer: COMMERCIAL

## 2025-05-30 ENCOUNTER — HOSPITAL ENCOUNTER (OUTPATIENT)
Facility: MEDICAL CENTER | Age: 85
End: 2025-05-30
Attending: STUDENT IN AN ORGANIZED HEALTH CARE EDUCATION/TRAINING PROGRAM | Admitting: STUDENT IN AN ORGANIZED HEALTH CARE EDUCATION/TRAINING PROGRAM
Payer: MEDICARE

## 2025-05-30 ENCOUNTER — ANESTHESIA (OUTPATIENT)
Dept: SURGERY | Facility: MEDICAL CENTER | Age: 85
End: 2025-05-30
Payer: MEDICARE

## 2025-05-30 VITALS
DIASTOLIC BLOOD PRESSURE: 72 MMHG | HEART RATE: 65 BPM | WEIGHT: 154.65 LBS | OXYGEN SATURATION: 93 % | RESPIRATION RATE: 16 BRPM | BODY MASS INDEX: 21.57 KG/M2 | SYSTOLIC BLOOD PRESSURE: 157 MMHG | TEMPERATURE: 97.2 F

## 2025-05-30 DIAGNOSIS — G89.18 POST-OP PAIN: Primary | ICD-10-CM

## 2025-05-30 LAB
ANION GAP SERPL CALC-SCNC: 16 MMOL/L (ref 7–16)
BUN SERPL-MCNC: 54 MG/DL (ref 8–22)
CALCIUM SERPL-MCNC: 8.9 MG/DL (ref 8.5–10.5)
CHLORIDE SERPL-SCNC: 108 MMOL/L (ref 96–112)
CO2 SERPL-SCNC: 17 MMOL/L (ref 20–33)
CREAT SERPL-MCNC: 3.49 MG/DL (ref 0.5–1.4)
GFR SERPLBLD CREATININE-BSD FMLA CKD-EPI: 16 ML/MIN/1.73 M 2
GLUCOSE SERPL-MCNC: 128 MG/DL (ref 65–99)
POTASSIUM SERPL-SCNC: 5.1 MMOL/L (ref 3.6–5.5)
SODIUM SERPL-SCNC: 141 MMOL/L (ref 135–145)

## 2025-05-30 PROCEDURE — 160046 HCHG PACU - 1ST 60 MINS PHASE II: Performed by: STUDENT IN AN ORGANIZED HEALTH CARE EDUCATION/TRAINING PROGRAM

## 2025-05-30 PROCEDURE — 36415 COLL VENOUS BLD VENIPUNCTURE: CPT

## 2025-05-30 PROCEDURE — 160015 HCHG STAT PREOP MINUTES: Performed by: STUDENT IN AN ORGANIZED HEALTH CARE EDUCATION/TRAINING PROGRAM

## 2025-05-30 PROCEDURE — 700101 HCHG RX REV CODE 250: Performed by: STUDENT IN AN ORGANIZED HEALTH CARE EDUCATION/TRAINING PROGRAM

## 2025-05-30 PROCEDURE — 160002 HCHG RECOVERY MINUTES (STAT): Performed by: STUDENT IN AN ORGANIZED HEALTH CARE EDUCATION/TRAINING PROGRAM

## 2025-05-30 PROCEDURE — A9270 NON-COVERED ITEM OR SERVICE: HCPCS | Performed by: STUDENT IN AN ORGANIZED HEALTH CARE EDUCATION/TRAINING PROGRAM

## 2025-05-30 PROCEDURE — 160035 HCHG PACU - 1ST 60 MINS PHASE I: Performed by: STUDENT IN AN ORGANIZED HEALTH CARE EDUCATION/TRAINING PROGRAM

## 2025-05-30 PROCEDURE — 700102 HCHG RX REV CODE 250 W/ 637 OVERRIDE(OP): Performed by: STUDENT IN AN ORGANIZED HEALTH CARE EDUCATION/TRAINING PROGRAM

## 2025-05-30 PROCEDURE — 700111 HCHG RX REV CODE 636 W/ 250 OVERRIDE (IP): Performed by: STUDENT IN AN ORGANIZED HEALTH CARE EDUCATION/TRAINING PROGRAM

## 2025-05-30 PROCEDURE — RXMED WILLOW AMBULATORY MEDICATION CHARGE: Performed by: STUDENT IN AN ORGANIZED HEALTH CARE EDUCATION/TRAINING PROGRAM

## 2025-05-30 PROCEDURE — 160031 HCHG SURGERY MINUTES - 1ST 30 MINS LEVEL 5: Performed by: STUDENT IN AN ORGANIZED HEALTH CARE EDUCATION/TRAINING PROGRAM

## 2025-05-30 PROCEDURE — 160042 HCHG SURGERY MINUTES - EA ADDL 1 MIN LEVEL 5: Performed by: STUDENT IN AN ORGANIZED HEALTH CARE EDUCATION/TRAINING PROGRAM

## 2025-05-30 PROCEDURE — 502714 HCHG ROBOTIC SURGERY SERVICES: Performed by: STUDENT IN AN ORGANIZED HEALTH CARE EDUCATION/TRAINING PROGRAM

## 2025-05-30 PROCEDURE — 700105 HCHG RX REV CODE 258: Performed by: STUDENT IN AN ORGANIZED HEALTH CARE EDUCATION/TRAINING PROGRAM

## 2025-05-30 PROCEDURE — 80048 BASIC METABOLIC PNL TOTAL CA: CPT

## 2025-05-30 PROCEDURE — 700111 HCHG RX REV CODE 636 W/ 250 OVERRIDE (IP): Mod: JZ | Performed by: STUDENT IN AN ORGANIZED HEALTH CARE EDUCATION/TRAINING PROGRAM

## 2025-05-30 PROCEDURE — 160025 RECOVERY II MINUTES (STATS): Performed by: STUDENT IN AN ORGANIZED HEALTH CARE EDUCATION/TRAINING PROGRAM

## 2025-05-30 PROCEDURE — 160009 HCHG ANES TIME/MIN: Performed by: STUDENT IN AN ORGANIZED HEALTH CARE EDUCATION/TRAINING PROGRAM

## 2025-05-30 PROCEDURE — 160048 HCHG OR STATISTICAL LEVEL 1-5: Performed by: STUDENT IN AN ORGANIZED HEALTH CARE EDUCATION/TRAINING PROGRAM

## 2025-05-30 PROCEDURE — 160036 HCHG PACU - EA ADDL 30 MINS PHASE I: Performed by: STUDENT IN AN ORGANIZED HEALTH CARE EDUCATION/TRAINING PROGRAM

## 2025-05-30 RX ORDER — ONDANSETRON 2 MG/ML
INJECTION INTRAMUSCULAR; INTRAVENOUS PRN
Status: DISCONTINUED | OUTPATIENT
Start: 2025-05-30 | End: 2025-05-30 | Stop reason: SURG

## 2025-05-30 RX ORDER — LIDOCAINE HYDROCHLORIDE 20 MG/ML
INJECTION, SOLUTION EPIDURAL; INFILTRATION; INTRACAUDAL; PERINEURAL PRN
Status: DISCONTINUED | OUTPATIENT
Start: 2025-05-30 | End: 2025-05-30 | Stop reason: SURG

## 2025-05-30 RX ORDER — LABETALOL HYDROCHLORIDE 5 MG/ML
5 INJECTION, SOLUTION INTRAVENOUS
Status: DISCONTINUED | OUTPATIENT
Start: 2025-05-30 | End: 2025-05-30 | Stop reason: HOSPADM

## 2025-05-30 RX ORDER — OXYCODONE HCL 5 MG/5 ML
10 SOLUTION, ORAL ORAL
Status: COMPLETED | OUTPATIENT
Start: 2025-05-30 | End: 2025-05-30

## 2025-05-30 RX ORDER — CEFAZOLIN SODIUM 1 G/3ML
INJECTION, POWDER, FOR SOLUTION INTRAMUSCULAR; INTRAVENOUS PRN
Status: DISCONTINUED | OUTPATIENT
Start: 2025-05-30 | End: 2025-05-30 | Stop reason: SURG

## 2025-05-30 RX ORDER — HALOPERIDOL 5 MG/ML
1 INJECTION INTRAMUSCULAR
Status: DISCONTINUED | OUTPATIENT
Start: 2025-05-30 | End: 2025-05-30 | Stop reason: HOSPADM

## 2025-05-30 RX ORDER — HYDROMORPHONE HYDROCHLORIDE 1 MG/ML
0.1 INJECTION, SOLUTION INTRAMUSCULAR; INTRAVENOUS; SUBCUTANEOUS
Status: DISCONTINUED | OUTPATIENT
Start: 2025-05-30 | End: 2025-05-30 | Stop reason: HOSPADM

## 2025-05-30 RX ORDER — ROCURONIUM BROMIDE 10 MG/ML
INJECTION, SOLUTION INTRAVENOUS PRN
Status: DISCONTINUED | OUTPATIENT
Start: 2025-05-30 | End: 2025-05-30 | Stop reason: SURG

## 2025-05-30 RX ORDER — ALBUTEROL SULFATE 5 MG/ML
2.5 SOLUTION RESPIRATORY (INHALATION)
Status: DISCONTINUED | OUTPATIENT
Start: 2025-05-30 | End: 2025-05-30 | Stop reason: HOSPADM

## 2025-05-30 RX ORDER — HYDROMORPHONE HYDROCHLORIDE 1 MG/ML
0.2 INJECTION, SOLUTION INTRAMUSCULAR; INTRAVENOUS; SUBCUTANEOUS
Status: DISCONTINUED | OUTPATIENT
Start: 2025-05-30 | End: 2025-05-30 | Stop reason: HOSPADM

## 2025-05-30 RX ORDER — OXYCODONE AND ACETAMINOPHEN 5; 325 MG/1; MG/1
1 TABLET ORAL EVERY 4 HOURS PRN
Qty: 15 TABLET | Refills: 0 | Status: SHIPPED | OUTPATIENT
Start: 2025-05-30 | End: 2025-06-04

## 2025-05-30 RX ORDER — HYDROMORPHONE HYDROCHLORIDE 1 MG/ML
0.4 INJECTION, SOLUTION INTRAMUSCULAR; INTRAVENOUS; SUBCUTANEOUS
Status: DISCONTINUED | OUTPATIENT
Start: 2025-05-30 | End: 2025-05-30 | Stop reason: HOSPADM

## 2025-05-30 RX ORDER — OXYCODONE HCL 5 MG/5 ML
5 SOLUTION, ORAL ORAL
Status: COMPLETED | OUTPATIENT
Start: 2025-05-30 | End: 2025-05-30

## 2025-05-30 RX ORDER — DIPHENHYDRAMINE HYDROCHLORIDE 50 MG/ML
12.5 INJECTION, SOLUTION INTRAMUSCULAR; INTRAVENOUS
Status: DISCONTINUED | OUTPATIENT
Start: 2025-05-30 | End: 2025-05-30 | Stop reason: HOSPADM

## 2025-05-30 RX ORDER — HYDRALAZINE HYDROCHLORIDE 20 MG/ML
5 INJECTION INTRAMUSCULAR; INTRAVENOUS
Status: DISCONTINUED | OUTPATIENT
Start: 2025-05-30 | End: 2025-05-30 | Stop reason: HOSPADM

## 2025-05-30 RX ORDER — SODIUM CHLORIDE, SODIUM LACTATE, POTASSIUM CHLORIDE, CALCIUM CHLORIDE 600; 310; 30; 20 MG/100ML; MG/100ML; MG/100ML; MG/100ML
INJECTION, SOLUTION INTRAVENOUS CONTINUOUS
Status: DISCONTINUED | OUTPATIENT
Start: 2025-05-30 | End: 2025-05-30 | Stop reason: HOSPADM

## 2025-05-30 RX ORDER — DEXAMETHASONE SODIUM PHOSPHATE 4 MG/ML
INJECTION, SOLUTION INTRA-ARTICULAR; INTRALESIONAL; INTRAMUSCULAR; INTRAVENOUS; SOFT TISSUE PRN
Status: DISCONTINUED | OUTPATIENT
Start: 2025-05-30 | End: 2025-05-30 | Stop reason: SURG

## 2025-05-30 RX ORDER — ACETAMINOPHEN 500 MG
1000 TABLET ORAL
Status: DISCONTINUED | OUTPATIENT
Start: 2025-05-30 | End: 2025-05-30 | Stop reason: HOSPADM

## 2025-05-30 RX ORDER — BUPIVACAINE HYDROCHLORIDE AND EPINEPHRINE 5; 5 MG/ML; UG/ML
INJECTION, SOLUTION EPIDURAL; INTRACAUDAL; PERINEURAL
Status: DISCONTINUED | OUTPATIENT
Start: 2025-05-30 | End: 2025-05-30 | Stop reason: HOSPADM

## 2025-05-30 RX ORDER — EPHEDRINE SULFATE 50 MG/ML
5 INJECTION, SOLUTION INTRAVENOUS
Status: DISCONTINUED | OUTPATIENT
Start: 2025-05-30 | End: 2025-05-30 | Stop reason: HOSPADM

## 2025-05-30 RX ORDER — ONDANSETRON 2 MG/ML
4 INJECTION INTRAMUSCULAR; INTRAVENOUS
Status: COMPLETED | OUTPATIENT
Start: 2025-05-30 | End: 2025-05-30

## 2025-05-30 RX ORDER — PHENYLEPHRINE HCL IN 0.9% NACL 1 MG/10 ML
SYRINGE (ML) INTRAVENOUS PRN
Status: DISCONTINUED | OUTPATIENT
Start: 2025-05-30 | End: 2025-05-30 | Stop reason: SURG

## 2025-05-30 RX ADMIN — SODIUM CHLORIDE, POTASSIUM CHLORIDE, SODIUM LACTATE AND CALCIUM CHLORIDE: 600; 310; 30; 20 INJECTION, SOLUTION INTRAVENOUS at 08:26

## 2025-05-30 RX ADMIN — ONDANSETRON 4 MG: 2 INJECTION INTRAMUSCULAR; INTRAVENOUS at 10:13

## 2025-05-30 RX ADMIN — FENTANYL CITRATE 50 MCG: 50 INJECTION, SOLUTION INTRAMUSCULAR; INTRAVENOUS at 09:13

## 2025-05-30 RX ADMIN — CEFAZOLIN 2 G: 1 INJECTION, POWDER, FOR SOLUTION INTRAMUSCULAR; INTRAVENOUS at 09:02

## 2025-05-30 RX ADMIN — ROCURONIUM BROMIDE 50 MG: 10 INJECTION INTRAVENOUS at 09:04

## 2025-05-30 RX ADMIN — FENTANYL CITRATE 50 MCG: 50 INJECTION, SOLUTION INTRAMUSCULAR; INTRAVENOUS at 10:15

## 2025-05-30 RX ADMIN — ONDANSETRON 4 MG: 2 INJECTION INTRAMUSCULAR; INTRAVENOUS at 09:26

## 2025-05-30 RX ADMIN — Medication 100 MCG: at 09:09

## 2025-05-30 RX ADMIN — PROPOFOL 150 MG: 10 INJECTION, EMULSION INTRAVENOUS at 09:04

## 2025-05-30 RX ADMIN — Medication 100 MCG: at 09:02

## 2025-05-30 RX ADMIN — OXYCODONE HYDROCHLORIDE 10 MG: 5 SOLUTION ORAL at 10:21

## 2025-05-30 RX ADMIN — LIDOCAINE HYDROCHLORIDE 80 MG: 20 INJECTION, SOLUTION EPIDURAL; INFILTRATION; INTRACAUDAL; PERINEURAL at 09:04

## 2025-05-30 RX ADMIN — SUGAMMADEX 200 MG: 100 INJECTION, SOLUTION INTRAVENOUS at 09:28

## 2025-05-30 RX ADMIN — FENTANYL CITRATE 50 MCG: 50 INJECTION, SOLUTION INTRAMUSCULAR; INTRAVENOUS at 09:00

## 2025-05-30 RX ADMIN — DEXAMETHASONE SODIUM PHOSPHATE 8 MG: 4 INJECTION INTRA-ARTICULAR; INTRALESIONAL; INTRAMUSCULAR; INTRAVENOUS; SOFT TISSUE at 09:07

## 2025-05-30 ASSESSMENT — PAIN DESCRIPTION - PAIN TYPE
TYPE: SURGICAL PAIN
TYPE: CHRONIC PAIN

## 2025-05-30 ASSESSMENT — FIBROSIS 4 INDEX: FIB4 SCORE: 1.53

## 2025-05-30 NOTE — OR NURSING
0937: Pt arrived post robotic ventral hernia repair, Mask oxygen/respirations unlabored/sats approp, Anesthesia report/OR RN hand off, IVF infusing, SCDs in place, Left abd incisions noted/closed with glue, Pt groggy but answers/[place]  1003: Dr Lock to update pt in PACU, Weaing oxygen as tolerated, Pain tolerable, Slight nausea  1026: Pt has been medicated for nausea/pain-see MAR, Remains on RA/sats approp, Family has been updated  1040: Pain is much better per pt, No nausea/sips of water, Anticipate transfer to [facility] soon/meets criteria

## 2025-05-30 NOTE — OR NURSING
1103 Report received from Raegan LINARES.     1105: Pt arrived to bay 8 and ambulated to recliner with assistance. Pt then changed into clothing with assistance. Dermabonded lap sites CDI.    1111 - handoff to Ana LINARES    1127 - Discharge instructions reviewed with and signed by pt spouse. All questions   answered and verbalized understanding. Awaiting    1137 - Pt wheeled to car via wheelchair by CNA. PIV removed. NAD. VSS. Belongings with patient.

## 2025-05-30 NOTE — DISCHARGE INSTRUCTIONS
Discharge instructions:    DIET: Upon discharge from the hospital you may resume your normal diet. Depending on how you are feeling and whether you have nausea or not, you may wish to stay with a bland diet for the first few days. However, you can advance this as quickly as you feel ready.    ACTIVITIES: After discharge from the hospital, you may resume full routine activities.   DRIVING: You may drive whenever you are off pain medications and are able to perform the activities needed to drive, i.e. turning, bending, twisting, etc.    BATHING: You may shower the day after surgery, but do not let the jets hit directly on the incisions. Let the soapy water drip down over the incisions. Do not submerge in a bath for at least a week. Your skin has glue on the incisions that will act as a scab and slowly come off after a week or two.    BOWEL FUNCTION:  The combination of pain medication and decreased activity level can cause constipation in otherwise normal patients. If you feel this is occurring, take a laxative (Milk of Magnesia, Ex-Lax, Senokot, etc.) until the problem has resolved.    PAIN MEDICATION: You will be given a prescription for pain medication at discharge. Please take these as directed. It is important to remember not to take medications on an empty stomach as this may cause nausea. Also, be aware narcotic pain medications cause constipation. Please take over the counter stool softeners while taking narcotic pain medications.    CALL IF YOU HAVE: (1) Fevers to more than 1010 F, (2) Unusual chest or leg pain, (3) Drainage or fluid from incision that may be foul smelling, increased tenderness or soreness at the wound or the wound edges are no longer together, redness or swelling at the incision site. Please do not hesitate to call with any other questions.     APPOINTMENT: Contact our office at [phone] for a follow-up appointment in 1 to 2 weeks following your procedure.    If you have any additional  questions, please do not hesitate to call the office.    Office address:  [address], [address] [place] [address]    Rafal Lock MD  Thoracic & General Surgeon  [facility]  [phone]      If any questions arise, call your provider.  If your provider is not available, please feel free to call the Surgical Center at [phone].    MEDICATIONS: Resume taking daily medication.  Take prescribed pain medication with food.  If no medication is prescribed, you may take non-aspirin pain medication if needed.  PAIN MEDICATION CAN BE VERY CONSTIPATING.  Take a stool softener or laxative such as senokot, pericolace, or milk of magnesia if needed.    Last pain medication given at 10:20 am    What to Expect Post Anesthesia    Rest and take it easy for the first 24 hours.  A responsible adult is recommended to remain with you during that time.  It is normal to feel sleepy.  We encourage you to not do anything that requires balance, judgment or coordination.    FOR 24 HOURS DO NOT:  Drive, operate machinery or run household appliances.  Drink beer or alcoholic beverages.  Make important decisions or sign legal documents.    To avoid nausea, slowly advance diet as tolerated, avoiding spicy or greasy foods for the first day.  Add more substantial food to your diet according to your provider's instructions.  Babies can be fed formula or breast milk as soon as they are hungry.  INCREASE FLUIDS AND FIBER TO AVOID CONSTIPATION.    MILD FLU-LIKE SYMPTOMS ARE NORMAL.  YOU MAY EXPERIENCE GENERALIZED MUSCLE ACHES, THROAT IRRITATION, HEADACHE AND/OR SOME NAUSEA.

## 2025-05-30 NOTE — ANESTHESIA POSTPROCEDURE EVALUATION
Patient: Joshua Saldivar [place]    Procedure Summary       Date: 05/30/25 Room / Location:  OR  / SURGERY [facility]    Anesthesia Start: 0858 Anesthesia Stop: 0943    Procedure: ROBOTIC VENTRAL HERNIA REPAIR WITH MESH (Abdomen) Diagnosis: (VENTRAL INCISIONAL HERNIA)    Surgeons: Rafal Lock M.D. Responsible Provider: Stephany Moore M.D.    Anesthesia Type: general ASA Status: 3            Final Anesthesia Type: general  Last vitals  BP   Blood Pressure : (!) 144/51    Temp   36.4 °C (97.5 °F)    Pulse   (!) 56   Resp   16    SpO2   94 %      Anesthesia Post Evaluation    Patient location during evaluation: PACU  Patient participation: complete - patient participated  Level of consciousness: awake and alert    Airway patency: patent  Anesthetic complications: no  Cardiovascular status: hemodynamically stable  Respiratory status: acceptable  Hydration status: euvolemic    PONV: none          No notable events documented.     Nurse Pain Score: 10 (NPRS) improved after fentanyl and oxycodone

## 2025-05-30 NOTE — ANESTHESIA TIME REPORT
Anesthesia Start and Stop Event Times       Date Time Event    5/30/2025 0844 Ready for Procedure     0858 Anesthesia Start     0943 Anesthesia Stop          Responsible Staff  05/30/25      Name Role Begin End    Stephany Moore M.D. Anesth 0858 0943          Overtime Reason:  no overtime (within assigned shift)    Comments:

## 2025-05-30 NOTE — ANESTHESIA PREPROCEDURE EVALUATION
Case: [id number] Date/Time: 05/30/25 0840    Procedure: ROBOTIC VENTRAL HERNIA REPAIR WITH MESH    Pre-op diagnosis: VENTRAL INCISIONAL HERNIA    Location: [facility] OR 01 / SURGERY [facility]    Surgeons: Rafal Lock M.D.            84M h/o moderate AS, pAfib on eliquis, HTN, HL, Stroke s/p TNK (11/2023), CKD4, anemia, Waldenstrom macroglobulinemia, Crohns, incisional hernia    TTE 11/23 - moderate AS  Hb 9.7, Cr 3, GFR 20    Relevant Problems   NEURO   (positive) Transient ischemic attack      CARDIAC   (positive) Primary hypertension         (positive) CKD (chronic kidney disease)      Other   (positive) Anemia due to stage 4 chronic kidney disease (HCC)   (positive) Hyperlipidemia   (positive) Incisional hernia with obstruction   (positive) Waldenstrom macroglobulinemia (HCC)       Physical Exam    Airway   Mallampati: II  TM distance: >3 FB  Neck ROM: full       Cardiovascular - normal exam  Rhythm: regular  Rate: normal    (-) murmur     Dental - normal exam           Pulmonary - normal exam   Abdominal    Neurological - normal exam                   Anesthesia Plan    ASA 3   ASA physical status 3 criteria: CVA or TIA - history (> 3 months), a thrombophilic disease requiring anticoagulation and hypertension - poorly controlled    Plan - general       Airway plan will be ETT          Induction: intravenous    Postoperative Plan: Postoperative administration of opioids is intended.    Pertinent diagnostic labs and testing reviewed    Informed Consent:    Anesthetic plan and risks discussed with patient.    Use of blood products discussed with: patient whom consented to blood products.

## 2025-05-30 NOTE — OP REPORT
Thoracic Surgery Operative Report    DATE OF OPERATION:    5/30/2025    PREOPERATIVE DIAGNOSIS:    Ventral hernia     POSTOPERATIVE DIAGNOSIS:   Adhesive disease  Prior ventral hernia repair    PROCEDURE PERFORMED:   Diagnostic laparoscopy    SURGEON:      Rafal Lock M.D.    ASSISTANT:      none    ANESTHESIOLOGIST:    Stephany Moore MD    ANESTHESIA:     General endotracheal anesthesia.    INDICATIONS:    Mr. Joshua Cross is a 84 y.o. male who presents with a ventral hernia. He is taken to the operating room for repair.        FINDINGS:   There were omental adhesions to the prior ventral hernia repair mesh.  The hernia palpated on the abdominal wall was covered by the mesh and the mesh had a nice peritoneal overgrowth thus no need for recurrent hernia repair.     WOUND CLASSIFICATION:    Class I, Clean.    SPECIMEN:       None.    ESTIMATED BLOOD LOSS:    5 mL.    DESCRIPTION OF PROCEDURE:    The patient was brought operating placed on the operating table in the supine position.  General endotracheal anesthesia was begun.  The patient's abdomen was prepped and draped in the usual sterile fashion.  A final timeout was performed to define the correct patient and procedure to be performed.    Stab incision was made in left upper quadrant and a Veress needle inserted into the abdomen.  The abdomen was insufflated to 15 mmHg.  A robotic trocar was then placed.With a camera there is no injury from the Veress needle or trocar placement.  There were omental adhesions to the anterior abdominal wall at the site of the hernia.  I then placed 2 additional robotic trocars along the left abdomen.  The patient was then flexed and placed in the right side down position.  The robot was then docked.  I began by taking down the adhesions with electrocautery.  Once all the adhesions were taken down as apparently mesh covered the entire midline.  This included the area where the hernia was.  I scrubbed back and palpated  the hernia at the umbilicus.  This was back to 1 cm defect and was nicely covered by the mesh.  The mesh had a nice.  No overgrowth.  Thus he does not need any hernia repair as the mesh is covering the defect.  The robot was then undocked and the abdomen desufflated.  All ports were removed.  Skin incisions were closed with Monocryl Dermabond applied.  The patient tolerated the procedure well without complication.  All counts were correct x 2.  The patient was awoken from general anesthesia and taken recovery room in good condition.      Rafal Lock MD  Thoracic & General Surgeon  [facility]  [phone]

## 2025-05-30 NOTE — ANESTHESIA PROCEDURE NOTES
Airway    Date/Time: 5/30/2025 9:06 AM    Performed by: Stephany Moore M.D.  Authorized by: Stephany Moore M.D.    Location:  OR  Urgency:  Elective  Indications for Airway Management:  Anesthesia      Spontaneous Ventilation: absent    Sedation Level:  Deep  Preoxygenated: Yes    Patient Position:  Sniffing  Mask Difficulty Assessment:  1 - vent by mask  Final Airway Type:  Endotracheal airway  Final Endotracheal Airway:  ETT  Cuffed: Yes    Technique Used for Successful ETT Placement:  Direct laryngoscopy    Insertion Site:  Oral  Blade Type:  Jeny  Laryngoscope Blade/Videolaryngoscope Blade Size:  4  ETT Size (mm):  7.0  Measured from:  Teeth  ETT to Teeth (cm):  22  Placement Verified by: auscultation and capnometry    Cormack-Lehane Classification:  Grade I - full view of glottis  Number of Attempts at Approach:  1

## 2025-06-05 ENCOUNTER — HOSPITAL ENCOUNTER (OUTPATIENT)
Facility: MEDICAL CENTER | Age: 85
End: 2025-06-06
Attending: EMERGENCY MEDICINE | Admitting: HOSPITALIST
Payer: MEDICARE

## 2025-06-05 ENCOUNTER — APPOINTMENT (OUTPATIENT)
Dept: RADIOLOGY | Facility: MEDICAL CENTER | Age: 85
End: 2025-06-05
Attending: EMERGENCY MEDICINE
Payer: MEDICARE

## 2025-06-05 DIAGNOSIS — R06.09 DYSPNEA ON EXERTION: Primary | ICD-10-CM

## 2025-06-05 PROBLEM — R79.89 ELEVATED TROPONIN: Status: ACTIVE | Noted: 2025-06-05

## 2025-06-05 PROBLEM — Z71.89 ACP (ADVANCE CARE PLANNING): Status: ACTIVE | Noted: 2025-06-05

## 2025-06-05 PROBLEM — N18.4 STAGE 4 CHRONIC KIDNEY DISEASE (HCC): Status: ACTIVE | Noted: 2025-06-05

## 2025-06-05 PROBLEM — R06.02 EXERTIONAL SHORTNESS OF BREATH: Status: ACTIVE | Noted: 2025-06-05

## 2025-06-05 LAB
ALBUMIN SERPL BCP-MCNC: 4.3 G/DL (ref 3.2–4.9)
ALBUMIN/GLOB SERPL: 1.8 G/DL
ALP SERPL-CCNC: 87 U/L (ref 30–99)
ALT SERPL-CCNC: 13 U/L (ref 2–50)
ANION GAP SERPL CALC-SCNC: 15 MMOL/L (ref 7–16)
AST SERPL-CCNC: 20 U/L (ref 12–45)
BASOPHILS # BLD AUTO: 0.5 % (ref 0–1.8)
BASOPHILS # BLD: 0.03 K/UL (ref 0–0.12)
BILIRUB SERPL-MCNC: 0.3 MG/DL (ref 0.1–1.5)
BUN SERPL-MCNC: 55 MG/DL (ref 8–22)
CALCIUM ALBUM COR SERPL-MCNC: 9.6 MG/DL (ref 8.5–10.5)
CALCIUM SERPL-MCNC: 9.8 MG/DL (ref 8.5–10.5)
CHLORIDE SERPL-SCNC: 106 MMOL/L (ref 96–112)
CO2 SERPL-SCNC: 21 MMOL/L (ref 20–33)
CREAT SERPL-MCNC: 3.4 MG/DL (ref 0.5–1.4)
D DIMER PPP IA.FEU-MCNC: 0.56 UG/ML (FEU) (ref 0–0.5)
EKG IMPRESSION: NORMAL
EOSINOPHIL # BLD AUTO: 0.06 K/UL (ref 0–0.51)
EOSINOPHIL NFR BLD: 1 % (ref 0–6.9)
ERYTHROCYTE [DISTWIDTH] IN BLOOD BY AUTOMATED COUNT: 48.8 FL (ref 35.9–50)
FLUAV RNA SPEC QL NAA+PROBE: NEGATIVE
FLUBV RNA SPEC QL NAA+PROBE: NEGATIVE
GFR SERPLBLD CREATININE-BSD FMLA CKD-EPI: 17 ML/MIN/1.73 M 2
GLOBULIN SER CALC-MCNC: 2.4 G/DL (ref 1.9–3.5)
GLUCOSE SERPL-MCNC: 151 MG/DL (ref 65–99)
HCT VFR BLD AUTO: 32.3 % (ref 42–52)
HGB BLD-MCNC: 10.4 G/DL (ref 14–18)
IMM GRANULOCYTES # BLD AUTO: 0.06 K/UL (ref 0–0.11)
IMM GRANULOCYTES NFR BLD AUTO: 1 % (ref 0–0.9)
LYMPHOCYTES # BLD AUTO: 1.13 K/UL (ref 1–4.8)
LYMPHOCYTES NFR BLD: 18.7 % (ref 22–41)
MCH RBC QN AUTO: 33.7 PG (ref 27–33)
MCHC RBC AUTO-ENTMCNC: 32.2 G/DL (ref 32.3–36.5)
MCV RBC AUTO: 104.5 FL (ref 81.4–97.8)
MONOCYTES # BLD AUTO: 0.59 K/UL (ref 0–0.85)
MONOCYTES NFR BLD AUTO: 9.8 % (ref 0–13.4)
NEUTROPHILS # BLD AUTO: 4.16 K/UL (ref 1.82–7.42)
NEUTROPHILS NFR BLD: 69 % (ref 44–72)
NRBC # BLD AUTO: 0 K/UL
NRBC BLD-RTO: 0 /100 WBC (ref 0–0.2)
NT-PROBNP SERPL IA-MCNC: 997 PG/ML (ref 0–125)
PLATELET # BLD AUTO: 220 K/UL (ref 164–446)
PMV BLD AUTO: 9.7 FL (ref 9–12.9)
POTASSIUM SERPL-SCNC: 4.5 MMOL/L (ref 3.6–5.5)
PROCALCITONIN SERPL-MCNC: 0.18 NG/ML
PROT SERPL-MCNC: 6.7 G/DL (ref 6–8.2)
RBC # BLD AUTO: 3.09 M/UL (ref 4.7–6.1)
RSV RNA SPEC QL NAA+PROBE: NEGATIVE
SARS-COV-2 RNA RESP QL NAA+PROBE: NOTDETECTED
SODIUM SERPL-SCNC: 142 MMOL/L (ref 135–145)
SPECIMEN SOURCE: NORMAL
TROPONIN T SERPL-MCNC: 29 NG/L (ref 6–19)
TROPONIN T SERPL-MCNC: 29 NG/L (ref 6–19)
TROPONIN T SERPL-MCNC: 31 NG/L (ref 6–19)
TROPONIN T SERPL-MCNC: 35 NG/L (ref 6–19)
TSH SERPL DL<=0.005 MIU/L-ACNC: 1.2 UIU/ML (ref 0.38–5.33)
WBC # BLD AUTO: 6 K/UL (ref 4.8–10.8)

## 2025-06-05 PROCEDURE — 71045 X-RAY EXAM CHEST 1 VIEW: CPT

## 2025-06-05 PROCEDURE — 83880 ASSAY OF NATRIURETIC PEPTIDE: CPT

## 2025-06-05 PROCEDURE — 700102 HCHG RX REV CODE 250 W/ 637 OVERRIDE(OP): Performed by: HOSPITALIST

## 2025-06-05 PROCEDURE — 0202U NFCT DS 22 TRGT SARS-COV-2: CPT

## 2025-06-05 PROCEDURE — 85025 COMPLETE CBC W/AUTO DIFF WBC: CPT

## 2025-06-05 PROCEDURE — 99497 ADVNCD CARE PLAN 30 MIN: CPT | Performed by: HOSPITALIST

## 2025-06-05 PROCEDURE — 0241U HCHG SARS-COV-2 COVID-19 NFCT DS RESP RNA 4 TRGT MIC: CPT

## 2025-06-05 PROCEDURE — 84443 ASSAY THYROID STIM HORMONE: CPT

## 2025-06-05 PROCEDURE — A9270 NON-COVERED ITEM OR SERVICE: HCPCS | Performed by: EMERGENCY MEDICINE

## 2025-06-05 PROCEDURE — 700111 HCHG RX REV CODE 636 W/ 250 OVERRIDE (IP): Mod: JZ | Performed by: HOSPITALIST

## 2025-06-05 PROCEDURE — 85379 FIBRIN DEGRADATION QUANT: CPT

## 2025-06-05 PROCEDURE — 87040 BLOOD CULTURE FOR BACTERIA: CPT

## 2025-06-05 PROCEDURE — 80053 COMPREHEN METABOLIC PANEL: CPT

## 2025-06-05 PROCEDURE — 700102 HCHG RX REV CODE 250 W/ 637 OVERRIDE(OP): Performed by: EMERGENCY MEDICINE

## 2025-06-05 PROCEDURE — G0378 HOSPITAL OBSERVATION PER HR: HCPCS

## 2025-06-05 PROCEDURE — 700105 HCHG RX REV CODE 258: Performed by: EMERGENCY MEDICINE

## 2025-06-05 PROCEDURE — 84145 PROCALCITONIN (PCT): CPT

## 2025-06-05 PROCEDURE — A9270 NON-COVERED ITEM OR SERVICE: HCPCS | Performed by: HOSPITALIST

## 2025-06-05 PROCEDURE — 84484 ASSAY OF TROPONIN QUANT: CPT

## 2025-06-05 PROCEDURE — 36415 COLL VENOUS BLD VENIPUNCTURE: CPT

## 2025-06-05 PROCEDURE — 93005 ELECTROCARDIOGRAM TRACING: CPT | Mod: TC | Performed by: EMERGENCY MEDICINE

## 2025-06-05 PROCEDURE — 99223 1ST HOSP IP/OBS HIGH 75: CPT | Mod: 25 | Performed by: HOSPITALIST

## 2025-06-05 PROCEDURE — 96374 THER/PROPH/DIAG INJ IV PUSH: CPT

## 2025-06-05 PROCEDURE — 99285 EMERGENCY DEPT VISIT HI MDM: CPT

## 2025-06-05 RX ORDER — SIMVASTATIN 20 MG
40 TABLET ORAL EVERY MORNING
Status: DISCONTINUED | OUTPATIENT
Start: 2025-06-06 | End: 2025-06-06 | Stop reason: HOSPADM

## 2025-06-05 RX ORDER — HEPARIN SODIUM 5000 [USP'U]/ML
5000 INJECTION, SOLUTION INTRAVENOUS; SUBCUTANEOUS EVERY 8 HOURS
Status: DISCONTINUED | OUTPATIENT
Start: 2025-06-06 | End: 2025-06-06 | Stop reason: HOSPADM

## 2025-06-05 RX ORDER — SODIUM CHLORIDE 9 MG/ML
1000 INJECTION, SOLUTION INTRAVENOUS ONCE
Status: COMPLETED | OUTPATIENT
Start: 2025-06-05 | End: 2025-06-05

## 2025-06-05 RX ORDER — AMLODIPINE BESYLATE 5 MG/1
2.5 TABLET ORAL 2 TIMES DAILY
Status: DISCONTINUED | OUTPATIENT
Start: 2025-06-05 | End: 2025-06-06 | Stop reason: HOSPADM

## 2025-06-05 RX ORDER — DAPAGLIFLOZIN 10 MG/1
10 TABLET, FILM COATED ORAL DAILY
Status: DISCONTINUED | OUTPATIENT
Start: 2025-06-06 | End: 2025-06-06 | Stop reason: HOSPADM

## 2025-06-05 RX ORDER — ACETAMINOPHEN 325 MG/1
650 TABLET ORAL EVERY 6 HOURS PRN
Status: DISCONTINUED | OUTPATIENT
Start: 2025-06-05 | End: 2025-06-06 | Stop reason: HOSPADM

## 2025-06-05 RX ORDER — AMOXICILLIN AND CLAVULANATE POTASSIUM 500; 125 MG/1; MG/1
1 TABLET, FILM COATED ORAL ONCE
Status: COMPLETED | OUTPATIENT
Start: 2025-06-05 | End: 2025-06-05

## 2025-06-05 RX ORDER — AMOXICILLIN 250 MG
2 CAPSULE ORAL EVERY EVENING
Status: DISCONTINUED | OUTPATIENT
Start: 2025-06-05 | End: 2025-06-06 | Stop reason: HOSPADM

## 2025-06-05 RX ORDER — FAMOTIDINE 20 MG/1
20 TABLET, FILM COATED ORAL DAILY
Status: DISCONTINUED | OUTPATIENT
Start: 2025-06-05 | End: 2025-06-06 | Stop reason: HOSPADM

## 2025-06-05 RX ORDER — ONDANSETRON 4 MG/1
4 TABLET, ORALLY DISINTEGRATING ORAL EVERY 4 HOURS PRN
Status: DISCONTINUED | OUTPATIENT
Start: 2025-06-05 | End: 2025-06-06 | Stop reason: HOSPADM

## 2025-06-05 RX ORDER — OXYCODONE AND ACETAMINOPHEN 5; 325 MG/1; MG/1
1 TABLET ORAL EVERY 4 HOURS PRN
COMMUNITY

## 2025-06-05 RX ORDER — OXYCODONE AND ACETAMINOPHEN 5; 325 MG/1; MG/1
1 TABLET ORAL EVERY 4 HOURS PRN
Refills: 0 | Status: DISCONTINUED | OUTPATIENT
Start: 2025-06-05 | End: 2025-06-06 | Stop reason: HOSPADM

## 2025-06-05 RX ORDER — ONDANSETRON 2 MG/ML
4 INJECTION INTRAMUSCULAR; INTRAVENOUS EVERY 4 HOURS PRN
Status: DISCONTINUED | OUTPATIENT
Start: 2025-06-05 | End: 2025-06-06 | Stop reason: HOSPADM

## 2025-06-05 RX ORDER — LOSARTAN POTASSIUM 25 MG/1
25 TABLET ORAL DAILY
Status: DISCONTINUED | OUTPATIENT
Start: 2025-06-06 | End: 2025-06-06 | Stop reason: HOSPADM

## 2025-06-05 RX ORDER — AMLODIPINE BESYLATE 5 MG/1
5 TABLET ORAL DAILY
Status: DISCONTINUED | OUTPATIENT
Start: 2025-06-06 | End: 2025-06-05

## 2025-06-05 RX ORDER — POLYETHYLENE GLYCOL 3350 17 G/17G
1 POWDER, FOR SOLUTION ORAL
Status: DISCONTINUED | OUTPATIENT
Start: 2025-06-05 | End: 2025-06-06 | Stop reason: HOSPADM

## 2025-06-05 RX ORDER — AZITHROMYCIN 250 MG/1
500 TABLET, FILM COATED ORAL ONCE
Status: COMPLETED | OUTPATIENT
Start: 2025-06-05 | End: 2025-06-05

## 2025-06-05 RX ORDER — ASPIRIN 81 MG/1
81 TABLET ORAL DAILY
Status: DISCONTINUED | OUTPATIENT
Start: 2025-06-06 | End: 2025-06-06 | Stop reason: HOSPADM

## 2025-06-05 RX ADMIN — FAMOTIDINE 20 MG: 20 TABLET, FILM COATED ORAL at 16:14

## 2025-06-05 RX ADMIN — AZITHROMYCIN DIHYDRATE 500 MG: 250 TABLET ORAL at 12:39

## 2025-06-05 RX ADMIN — SODIUM CHLORIDE 1000 ML: 9 INJECTION, SOLUTION INTRAVENOUS at 10:41

## 2025-06-05 RX ADMIN — AMOXICILLIN AND CLAVULANATE POTASSIUM 1 TABLET: 500; 125 TABLET, FILM COATED ORAL at 12:39

## 2025-06-05 RX ADMIN — AMLODIPINE BESYLATE 2.5 MG: 5 TABLET ORAL at 21:10

## 2025-06-05 RX ADMIN — ONDANSETRON 4 MG: 2 INJECTION INTRAMUSCULAR; INTRAVENOUS at 17:12

## 2025-06-05 ASSESSMENT — ENCOUNTER SYMPTOMS
FLANK PAIN: 0
SHORTNESS OF BREATH: 1
EYE DISCHARGE: 0
STRIDOR: 0
FOCAL WEAKNESS: 0
NERVOUS/ANXIOUS: 0
EYE REDNESS: 0
FEVER: 0
CHILLS: 0
VOMITING: 0
BRUISES/BLEEDS EASILY: 0
ABDOMINAL PAIN: 0
PALPITATIONS: 1
COUGH: 0
MYALGIAS: 0

## 2025-06-05 ASSESSMENT — PATIENT HEALTH QUESTIONNAIRE - PHQ9
2. FEELING DOWN, DEPRESSED, IRRITABLE, OR HOPELESS: NOT AT ALL
SUM OF ALL RESPONSES TO PHQ9 QUESTIONS 1 AND 2: 0
1. LITTLE INTEREST OR PLEASURE IN DOING THINGS: NOT AT ALL
SUM OF ALL RESPONSES TO PHQ9 QUESTIONS 1 AND 2: 0
2. FEELING DOWN, DEPRESSED, IRRITABLE, OR HOPELESS: NOT AT ALL
1. LITTLE INTEREST OR PLEASURE IN DOING THINGS: NOT AT ALL

## 2025-06-05 ASSESSMENT — PAIN DESCRIPTION - PAIN TYPE
TYPE: ACUTE PAIN
TYPE: ACUTE PAIN

## 2025-06-05 ASSESSMENT — FIBROSIS 4 INDEX
FIB4 SCORE: 2.117946203769056648
FIB4 SCORE: 1.53

## 2025-06-05 NOTE — CARE PLAN
The patient is Stable - Low risk of patient condition declining or worsening    Shift Goals  Clinical Goals: monitor for SOB  Patient Goals: rest  Family Goals: tawanda    Progress made toward(s) clinical / shift goals:  education provided regarding fall safety including use of call bell for assistance, bed kept in lowest position    Patient is not progressing towards the following goals:

## 2025-06-05 NOTE — ED NOTES
"Pt ambulates. Pt states he just had an abdominal hernia repair on Friday. Pt states since he has had rapid palpitations in his heart over the past few days. Pt states that when he woke up this morning he started feeling \"vibrating\" intermittently over his heart that he feels like could be in his heart or on his left pectoralis.   "

## 2025-06-05 NOTE — ED TRIAGE NOTES
"BP (!) 155/73   Pulse 65   Temp 36.7 °C (98 °F) (Temporal)   Resp 16   Ht 1.803 m (5' 11\")   Wt 70 kg (154 lb 5.2 oz)   SpO2 96%   BMI 21.52 kg/m²   Chief Complaint   Patient presents with    Palpitations     Strong of a-fib has been feeling fluttering in chest  about 10-15 seconds  worse last night during dinner    today having more feelings of fluttering     recent abdomen surgery  hernia repair this past Friday  is healing well from this         Comes in w/ wife   EKG done in triage  "

## 2025-06-05 NOTE — H&P
Hospital Medicine History & Physical Note    Date of Service  6/5/2025    Primary Care Physician  Isis Diaz M.D.    Consultants  None       Code Status  Full Code    Chief Complaint  Chief Complaint   Patient presents with    Palpitations     Strong of a-fib has been feeling fluttering in chest  about 10-15 seconds  worse last night during dinner    today having more feelings of fluttering     recent abdomen surgery  hernia repair this past Friday  is healing well from this         History of Presenting Illness  Joshua Cross is a 84 y.o. male with a past medical history of chronic kidney disease and atrial fibrillation who presented 6/5/2025 with palpitation, shortness of breath and generalized weakness.  Patient reports that has not been feeling well since his abdominal hernia repair that was done last Friday.  He has been having multiple episodes of palpitations, generalized weakness and shortness of breath.  Shortness of breath is mostly with exertion.  He denies noticing lower extremity pain redness or swelling.      I discussed the plan of care with emergency physician, and the patient     Review of Systems  Review of Systems   Constitutional:  Positive for malaise/fatigue. Negative for chills and fever.   Eyes:  Negative for discharge and redness.   Respiratory:  Positive for shortness of breath. Negative for cough and stridor.    Cardiovascular:  Positive for palpitations. Negative for chest pain and leg swelling.   Gastrointestinal:  Negative for abdominal pain and vomiting.   Genitourinary:  Negative for flank pain.   Musculoskeletal:  Negative for myalgias.   Skin: Negative.    Neurological:  Negative for focal weakness.   Endo/Heme/Allergies:  Does not bruise/bleed easily.   Psychiatric/Behavioral:  The patient is not nervous/anxious.      Past Medical History   has a past medical history of Alcohol use (2014), Anemia, Arrhythmia, Arthritis, Back pain (2014), Bowel habit changes, Cancer  (HCC), Cataract (2020), Cholesterol blood decreased, Crohn's disease (HCC), Dental disorder, Exertional shortness of breath (6/5/2025), Heart burn, Heart murmur, Hepatitis A (1980), High cholesterol, Hypertension, Indigestion (2000), Liver abscess (2011), Pneumonia (1995), Renal disorder (2014), Seizure (HCC), Skin cancer, Stroke (HCC) (11/2023), and Umbilical hernia.    Surgical History   has a past surgical history that includes DX-DRAIN-LIVER ABSCESS-CYST (01/01/2011); pr resect small intest,each addnl (01/01/2012); tonsillectomy (01/01/1949); ventral hernia repair laparoscopic (05/08/2014); bone marrow aspiration (Left, 02/13/2019); bone marrow biopsy, ndl/trocar (Left, 02/13/2019); pr shldr arthroscop,surg,w/rotat cuff repr (Left, 03/08/2023); pr shldr arthroscop part debride 1-2 (Left, 03/08/2023); pr shldr arthroscop,part acromioplas (Left, 03/08/2023); pr repair biceps long tendon (Left, 03/08/2023); other abdominal surgery (11/2012); other (5/2014); and laparoscopy robotic xi (5/30/2025).     Family History  family history includes Dementia in his mother; Heart Disease in his brother and father; Stroke in his father.      Social History   reports that he quit smoking about 53 years ago. His smoking use included cigarettes. He started smoking about 63 years ago. He has a 10 pack-year smoking history. He has never used smokeless tobacco. He reports that he does not currently use alcohol. He reports that he does not use drugs.    Allergies  Allergies[1]    Medications  Prior to Admission Medications   Prescriptions Last Dose Informant Patient Reported? Taking?   Calcium Carbonate-Vit D-Min (CALCIUM 1200 PO)   Yes No   Sig: Take 1,200 mg by mouth every day.   Cyanocobalamin (VITAMIN B-12 PO)   Yes No   Sig: Take  by mouth every day.   FARXIGA 10 MG Tab  Patient Yes No   Sig: Take 10 mg by mouth every day.   Multiple Vitamin (MULTI-VITAMIN DAILY PO)  Patient Yes No   Sig: Take 1 Tablet by mouth every day.    Ustekinumab (STELARA) 90 MG/ML Solution Prefilled Syringe  Patient Yes No   Sig: Inject 90 mg under the skin Every 60 days. Indications: Crohn's Disease   VITAMIN D PO  Patient Yes No   Sig: Take 1 Capsule by mouth every day.   acetaminophen (TYLENOL 8 HOUR ARTHRITIS PAIN) 650 MG CR tablet  Patient Yes No   Sig: Take 650-1,300 mg by mouth as needed for Mild Pain. Indications: Pain   amLODIPine (NORVASC) 2.5 MG Tab   Yes No   Sig: Take 5 mg by mouth every day.   apixaban (ELIQUIS) 5mg Tab  Patient No No   Sig: Take 1 Tablet by mouth 2 times a day.   Patient taking differently: Take 2.5 mg by mouth 2 times a day.   famotidine (PEPCID) 20 MG Tab   Yes No   Sig: Take 20 mg by mouth 2 times a day.   losartan (COZAAR) 25 MG Tab  Patient Yes No   Sig: Take 25 mg by mouth every day.   simvastatin (ZOCOR) 40 MG TABS  Patient Yes No   Sig: Take 40 mg by mouth every morning.      Facility-Administered Medications: None     Physical Exam  Temp:  [36.1 °C (96.9 °F)-36.7 °C (98 °F)] 36.1 °C (96.9 °F)  Pulse:  [50-65] 58  Resp:  [16-23] 20  BP: (150-192)/(64-79) 165/74  SpO2:  [96 %-97 %] 96 %  Blood Pressure : (!) 192/79 (Rn notified)   Temperature: 36.1 °C (96.9 °F)   Pulse: (!) 58   Respiration: 20   Pulse Oximetry: 96 %     Physical Exam  Constitutional:       General: He is not in acute distress.     Appearance: He is not ill-appearing or diaphoretic.   HENT:      Head: Atraumatic.      Right Ear: External ear normal.      Left Ear: External ear normal.      Nose: No congestion or rhinorrhea.      Mouth/Throat:      Mouth: Mucous membranes are moist.   Eyes:      General: No scleral icterus.        Right eye: No discharge.         Left eye: No discharge.      Pupils: Pupils are equal, round, and reactive to light.   Cardiovascular:      Rate and Rhythm: Normal rate and regular rhythm.   Pulmonary:      Effort: Pulmonary effort is normal.      Comments: Saturating well on room air.  Is able to speak in full  sentences  Abdominal:      General: There is no distension.   Musculoskeletal:      Cervical back: Neck supple. No rigidity. No muscular tenderness.      Right lower leg: No edema.      Left lower leg: No edema.   Skin:     Coloration: Skin is not jaundiced or pale.   Neurological:      Mental Status: He is alert and oriented to person, place, and time.      Coordination: Coordination normal.   Psychiatric:         Mood and Affect: Mood normal.         Behavior: Behavior normal.       Laboratory:  Recent Labs     06/05/25  0958   WBC 6.0   RBC 3.09*   HEMOGLOBIN 10.4*   HEMATOCRIT 32.3*   .5*   MCH 33.7*   MCHC 32.2*   RDW 48.8   PLATELETCT 220   MPV 9.7     Recent Labs     06/05/25  0958   SODIUM 142   POTASSIUM 4.5   CHLORIDE 106   CO2 21   GLUCOSE 151*   BUN 55*   CREATININE 3.40*   CALCIUM 9.8     Recent Labs     06/05/25  0958   ALTSGPT 13   ASTSGOT 20   ALKPHOSPHAT 87   TBILIRUBIN 0.3   GLUCOSE 151*         Recent Labs     06/05/25  0958   NTPROBNP 997*         Recent Labs     06/05/25  0958 06/05/25  1205   TROPONINT 35* 29*     Imaging:  DX-CHEST-PORTABLE (1 VIEW)   Final Result      Patchy left basilar opacities, concerning for infection.        I personally reviewed patient EKG shows sinus bradycardia with a rate of 53.  There is no ST elevation.  There is left anterior fascicular branch block.  There are T wave inversions in leads III, leads V1-V2.  QTc is 437.     Assessment/Plan:  Justification for Admission Status  I anticipate this patient is appropriate for observation status at this time because patient has exertional shortness of breath.    Patient will need a Telemetry bed on MEDICAL service.      * Exertional shortness of breath- (present on admission)  Assessment & Plan  I will check a D-dimer.  D-dimer is 0.56 which is negative for just for his age.  I will check procalcitonin level  I will check a PCR respiratory panel  I will check a stress test to rule out angina    Elevated troponin-  (present on admission)  Assessment & Plan  In the indeterminate range  Denies chest pain.  EKG shows sinus bradycardia with a rate of 53.  There is no ST elevation.  There is left anterior fascicular branch block.  There are T wave inversions in leads III, leads V1-V2.  QTc is 437.    I will place on continuous cardiac monitoring    I will trend troponin levels  Will check a stress test given his exertional shortness of breath    Stage 4 chronic kidney disease (HCC)- (present on admission)  Assessment & Plan  Avoid/minimize nephrotoxins as much as possible, renally dose medications, monitor inputs and outputs     Anemia due to stage 4 chronic kidney disease (HCC)- (present on admission)  Assessment & Plan  Avoid/minimize nephrotoxins as much as possible, renally dose medications, monitor inputs and outputs     Primary hypertension- (present on admission)  Assessment & Plan  Resume home losartan and amlodipine with hold parameters    ACP (advance care planning)- (present on admission)  Assessment & Plan  I had a discussion with the patient regarding goals of care, diagnoses, prognosis, and CODE STATUS. We discussed his prognosis and comorbidities.  The patient has advanced age of 84 years.  He has a number of chronic medical problems including chronic kidney disease, hypertension and hyperlipidemia.  He is presenting with exertional shortness of breath.  However, the patient enjoys a very good cognitive and functional capacity.  I explained CPR, intubation and mechanical ventilation.  Currently the patient wants to maintain a full code.  He is open to all forms of invasive or noninvasive diagnostic and therapeutic interventions including stress test coronary angiography and coronary artery bypass if needed.     Hyperlipidemia- (present on admission)  Assessment & Plan  Mixed hyperlipidemia.  I will resume simvastatin      VTE prophylaxis: SCDs/TEDs, heparin subcut      I had a discussion with the patient regarding  goals of care, diagnoses, prognosis, and CODE STATUS. We discussed his prognosis and comorbidities.  The patient has advanced age of 84 years.  He has a number of chronic medical problems including chronic kidney disease, hypertension and hyperlipidemia.  He is presenting with exertional shortness of breath.  However, the patient enjoys a very good cognitive and functional capacity.  I explained CPR, intubation and mechanical ventilation.  Currently the patient wants to maintain a full code.  He is open to all forms of invasive or noninvasive diagnostic and therapeutic interventions including stress test coronary angiography and coronary artery bypass if needed. I spent 18 minutes on advanced care planning.            [1] No Known Allergies

## 2025-06-05 NOTE — ED PROVIDER NOTES
ED Provider Note    CHIEF COMPLAINT  Chief Complaint   Patient presents with    Palpitations     Strong of a-fib has been feeling fluttering in chest  about 10-15 seconds  worse last night during dinner    today having more feelings of fluttering     recent abdomen surgery  hernia repair this past Friday  is healing well from this           EXTERNAL RECORDS REVIEWED  Inpatient Notes Underwent a ventral hernia repair by Dr. Lock 5/30/25.    HPI/ROS  LIMITATION TO HISTORY   Select: : None  OUTSIDE HISTORIAN(S):  Significant other Wife reports the patient has had worsening shortness of breath/dyspnea on exertion for the past several weeks even prior to his hernia surgery.    Joshua Cross is a 84 y.o. male who presents with a fluttering sensation in his chest and associated shortness of breath. The patient has a history of atrial fibrillation and is on eliquis. He recently had a hernia repair surgery and was off anticoagulation for a short time but restarted on Saturday. He denies chest pain but has been experiencing shortness of breath especially on exertion over the past several weeks. He typically goes on walks every other day and has had decreased exercise tolerance for the past few weeks, even before his hernia surgery. He denies any fevers, hemoptysis, or leg swelling. He was prompted to come to the ER due to concerns for atrial fibrillation.    PAST MEDICAL HISTORY   has a past medical history of Alcohol use (2014), Anemia, Arrhythmia, Arthritis, Back pain (2014), Bowel habit changes, Cancer (HCC), Cataract (2020), Cholesterol blood decreased, Crohn's disease (HCC), Dental disorder, Heart burn, Heart murmur, Hepatitis A (1980), High cholesterol, Hypertension, Indigestion (2000), Liver abscess (2011), Pneumonia (1995), Renal disorder (2014), Seizure (HCC), Skin cancer, Stroke (HCC) (11/2023), and Umbilical hernia.    SURGICAL HISTORY   has a past surgical history that includes DX-DRAIN-LIVER ABSCESS-CYST  (2011); resect small intest,each addnl (2012); tonsillectomy (1949); ventral hernia repair laparoscopic (2014); bone marrow aspiration (Left, 2019); bone marrow biopsy, ndl/trocar (Left, 2019); shldr arthroscop,surg,w/rotat cuff repr (Left, 2023); shldr arthroscop part debride 1-2 (Left, 2023); shldr arthroscop,part acromioplas (Left, 2023); repair biceps long tendon (Left, 2023); other abdominal surgery (2012); other (2014); and laparoscopy robotic xi (2025).    FAMILY HISTORY  Family History   Problem Relation Age of Onset    Dementia Mother     Heart Disease Father     Stroke Father     Heart Disease Brother        SOCIAL HISTORY  Social History     Tobacco Use    Smoking status: Former     Current packs/day: 0.00     Average packs/day: 1 pack/day for 10.0 years (10.0 ttl pk-yrs)     Types: Cigarettes     Start date: 1962     Quit date: 1972     Years since quittin.1    Smokeless tobacco: Never   Vaping Use    Vaping status: Never Used   Substance and Sexual Activity    Alcohol use: Not Currently     Comment: stopped alcohol 2023    Drug use: Never    Sexual activity: Not on file       CURRENT MEDICATIONS  Home Medications       Reviewed by Evelyn Jefferson R.N. (Registered Nurse) on 25 at 0960  Med List Status: Partial     Medication Last Dose Status   acetaminophen (TYLENOL 8 HOUR ARTHRITIS PAIN) 650 MG CR tablet  Active   amLODIPine (NORVASC) 2.5 MG Tab  Active   apixaban (ELIQUIS) 5mg Tab  Active   Calcium Carbonate-Vit D-Min (CALCIUM 1200 PO)  Active   Cyanocobalamin (VITAMIN B-12 PO)  Active   famotidine (PEPCID) 20 MG Tab  Active   FARXIGA 10 MG Tab  Active   losartan (COZAAR) 25 MG Tab  Active   Multiple Vitamin (MULTI-VITAMIN DAILY PO)  Active   simvastatin (ZOCOR) 40 MG TABS  Active   Ustekinumab (STELARA) 90 MG/ML Solution Prefilled Syringe  Active   VITAMIN D PO  Active               "      ALLERGIES  Allergies[1]    PHYSICAL EXAM  VITAL SIGNS: BP (!) 155/73   Pulse 65   Temp 36.7 °C (98 °F) (Temporal)   Resp 16   Ht 1.803 m (5' 11\")   Wt 70 kg (154 lb 5.2 oz)   SpO2 96%   BMI 21.52 kg/m²    Physical Exam  Vitals and nursing note reviewed.   Constitutional:       Appearance: Normal appearance.   HENT:      Head: Normocephalic and atraumatic.      Right Ear: External ear normal.      Left Ear: External ear normal.      Nose: Nose normal.      Mouth/Throat:      Mouth: Mucous membranes are moist.      Pharynx: Oropharynx is clear.   Eyes:      Extraocular Movements: Extraocular movements intact.      Conjunctiva/sclera: Conjunctivae normal.      Pupils: Pupils are equal, round, and reactive to light.   Cardiovascular:      Rate and Rhythm: Normal rate and regular rhythm.   Pulmonary:      Breath sounds: Normal breath sounds.      Comments: Mildly increased work of breathing. Appears breathing during speech.  Abdominal:      Palpations: Abdomen is soft.      Tenderness: There is no abdominal tenderness.      Comments: Multiple small surgical incisions are healing well without surrounding erythema, dehiscence, drainage.   Musculoskeletal:         General: Normal range of motion.      Cervical back: Normal range of motion and neck supple.   Skin:     General: Skin is warm and dry.   Neurological:      General: No focal deficit present.      Mental Status: He is alert and oriented to person, place, and time.   Psychiatric:         Mood and Affect: Mood normal.         Behavior: Behavior normal.           EKG/LABS  Results for orders placed or performed during the hospital encounter of 06/05/25   EKG    Collection Time: 06/05/25  9:14 AM   Result Value Ref Range    Report       [facility] Emergency Dept.    Test Date:  2025-06-05  Pt Name:    JACK MONTANEZIVETT                 Department: [facility]  MRN:        [id number]                      Room:  Gender:     Male                     "     Technician: RICARDA  :        [id number]                   Requested By:ER TRIAGE PROTOCOL  Order #:    [id number]                    Reading MD:    Measurements  Intervals                                Axis  Rate:       148                          P:          246  MD:         111                          QRS:        -41  QRSD:       117                          T:          0  QT:         250  QTc:        393    Interpretive Statements  Supraventricular tachycardia  Paired ventricular premature complexes  Aberrant complex  Nonspecific IVCD with LAD  Nonspecific T abnrm, anterolateral leads  Compared to ECG 2025 13:00:43  Ventricular premature complex(es) now present  Aberrant conduction of supraventricular beat(s) now present  Intraventricular conduction de lay now present  Sinus bradycardia no longer present  Left-axis deviation no longer present     CBC WITH DIFFERENTIAL    Collection Time: 25  9:58 AM   Result Value Ref Range    WBC 6.0 4.8 - 10.8 K/uL    RBC 3.09 (L) 4.70 - 6.10 M/uL    Hemoglobin 10.4 (L) 14.0 - 18.0 g/dL    Hematocrit 32.3 (L) 42.0 - 52.0 %    .5 (H) 81.4 - 97.8 fL    MCH 33.7 (H) 27.0 - 33.0 pg    MCHC 32.2 (L) 32.3 - 36.5 g/dL    RDW 48.8 35.9 - 50.0 fL    Platelet Count 220 164 - 446 K/uL    MPV 9.7 9.0 - 12.9 fL    Neutrophils-Polys 69.00 44.00 - 72.00 %    Lymphocytes 18.70 (L) 22.00 - 41.00 %    Monocytes 9.80 0.00 - 13.40 %    Eosinophils 1.00 0.00 - 6.90 %    Basophils 0.50 0.00 - 1.80 %    Immature Granulocytes 1.00 (H) 0.00 - 0.90 %    Nucleated RBC 0.00 0.00 - 0.20 /100 WBC    Neutrophils (Absolute) 4.16 1.82 - 7.42 K/uL    Lymphs (Absolute) 1.13 1.00 - 4.80 K/uL    Monos (Absolute) 0.59 0.00 - 0.85 K/uL    Eos (Absolute) 0.06 0.00 - 0.51 K/uL    Baso (Absolute) 0.03 0.00 - 0.12 K/uL    Immature Granulocytes (abs) 0.06 0.00 - 0.11 K/uL    NRBC (Absolute) 0.00 K/uL   Comp Metabolic Panel    Collection Time: 25  9:58 AM   Result Value Ref Range    Sodium  142 135 - 145 mmol/L    Potassium 4.5 3.6 - 5.5 mmol/L    Chloride 106 96 - 112 mmol/L    Co2 21 20 - 33 mmol/L    Anion Gap 15.0 7.0 - 16.0    Glucose 151 (H) 65 - 99 mg/dL    Bun 55 (H) 8 - 22 mg/dL    Creatinine 3.40 (H) 0.50 - 1.40 mg/dL    Calcium 9.8 8.5 - 10.5 mg/dL    Correct Calcium 9.6 8.5 - 10.5 mg/dL    AST(SGOT) 20 12 - 45 U/L    ALT(SGPT) 13 2 - 50 U/L    Alkaline Phosphatase 87 30 - 99 U/L    Total Bilirubin 0.3 0.1 - 1.5 mg/dL    Albumin 4.3 3.2 - 4.9 g/dL    Total Protein 6.7 6.0 - 8.2 g/dL    Globulin 2.4 1.9 - 3.5 g/dL    A-G Ratio 1.8 g/dL   TSH    Collection Time: 06/05/25  9:58 AM   Result Value Ref Range    TSH 1.200 0.380 - 5.330 uIU/mL   TROPONIN    Collection Time: 06/05/25  9:58 AM   Result Value Ref Range    Troponin T 35 (H) 6 - 19 ng/L   proBrain Natriuretic Peptide, NT    Collection Time: 06/05/25  9:58 AM   Result Value Ref Range    NT-proBNP 997 (H) 0 - 125 pg/mL   ESTIMATED GFR    Collection Time: 06/05/25  9:58 AM   Result Value Ref Range    GFR (CKD-EPI) 17 (A) >60 mL/min/1.73 m 2   PROCALCITONIN    Collection Time: 06/05/25  9:58 AM   Result Value Ref Range    Procalcitonin 0.18 <0.25 ng/mL   D-DIMER    Collection Time: 06/05/25  9:58 AM   Result Value Ref Range    D-Dimer 0.56 (H) 0.00 - 0.50 ug/mL (FEU)   CoV-2, Flu A/B, And RSV by PCR ([employer])    Collection Time: 06/05/25 11:57 AM    Specimen: Respirate   Result Value Ref Range    Influenza virus A RNA Negative Negative    Influenza virus B, PCR Negative Negative    RSV, PCR Negative Negative    SARS-CoV-2 by PCR NotDetected     SARS-CoV-2 Source Nasal Swab    Respiratory Panel by PCR (Inpatient ONLY)    Collection Time: 06/05/25 11:57 AM    Specimen: Nasopharyngeal; Respirate   Result Value Ref Range    Adenovirus, PCR Not Detected     SARS-CoV-2 (COVID-19) RNA by MELISSA NotDetected     Coronavirus 229E, PCR Not Detected     Coronavirus HKU1, PCR Not Detected     Coronavirus NL63, PCR Not Detected     Coronavirus OC43, PCR Not  Detected     Human Metapneumovirus, PCR Not Detected     Rhino/Enterovirus, PCR Not Detected     Influenza A, PCR Not Detected     Influenza B, PCR Not Detected     Parainfluenza 1, PCR Not Detected     Parainfluenza 2, PCR Not Detected     Parainfluenza 3, PCR Not Detected     Parainfluenza 4, PCR Not Detected     RSV (Respiratory Syncytial Virus), PCR Not Detected     Bordetella parapertussis (IE2296), PCR Not Detected     Bordetella pertussis (ptxP), PCR Not Detected     Mycoplasma pneumoniae, PCR Not Detected     Chlamydia pneumoniae, PCR Not Detected    TROPONIN    Collection Time: 06/05/25 12:05 PM   Result Value Ref Range    Troponin T 29 (H) 6 - 19 ng/L   BLOOD CULTURE    Collection Time: 06/05/25 12:20 PM    Specimen: Peripheral; Blood   Result Value Ref Range    Significant Indicator NEG     Source BLD     Site PERIPHERAL     Culture Result       No Growth  Note: Blood cultures are incubated for 5 days and  are monitored continuously.Positive blood cultures  are called to the RN and reported as soon as  they are identified.  Blood culture testing and Gram stain, if indicated, are  performed at [facility],  [address] [place] [address].  Positive blood  cultures are sent to [facility],  [address] [place] [address], for organism identification  and susceptibility testing.     BLOOD CULTURE    Collection Time: 06/05/25 12:20 PM    Specimen: Peripheral; Blood   Result Value Ref Range    Significant Indicator NEG     Source BLD     Site PERIPHERAL     Culture Result       No Growth  Note: Blood cultures are incubated for 5 days and  are monitored continuously.Positive blood cultures  are called to the RN and reported as soon as  they are identified.  Blood culture testing and Gram stain, if indicated, are  performed at [facility],  [address] [place] [address].  Positive blood  cultures are sent to [facility],  [address] [place] [address], for organism identification  and susceptibility testing.     Troponin in four (4) hours    Collection Time: 06/05/25  5:58 PM   Result Value Ref Range    Troponin T 29 (H) 6 - 19 ng/L   TROPONIN    Collection Time: 06/05/25 10:39 PM   Result Value Ref Range    Troponin T 31 (H) 6 - 19 ng/L   CBC without Differential    Collection Time: 06/06/25  2:09 AM   Result Value Ref Range    WBC 7.6 4.8 - 10.8 K/uL    RBC 2.74 (L) 4.70 - 6.10 M/uL    Hemoglobin 9.2 (L) 14.0 - 18.0 g/dL    Hematocrit 28.8 (L) 42.0 - 52.0 %    .1 (H) 81.4 - 97.8 fL    MCH 33.6 (H) 27.0 - 33.0 pg    MCHC 31.9 (L) 32.3 - 36.5 g/dL    RDW 49.8 35.9 - 50.0 fL    Platelet Count 206 164 - 446 K/uL    MPV 9.6 9.0 - 12.9 fL   Comp Metabolic Panel (CMP)    Collection Time: 06/06/25  2:09 AM   Result Value Ref Range    Sodium 144 135 - 145 mmol/L    Potassium 5.2 3.6 - 5.5 mmol/L    Chloride 110 96 - 112 mmol/L    Co2 19 (L) 20 - 33 mmol/L    Anion Gap 15.0 7.0 - 16.0    Glucose 103 (H) 65 - 99 mg/dL    Bun 54 (H) 8 - 22 mg/dL    Creatinine 3.31 (H) 0.50 - 1.40 mg/dL    Calcium 8.8 8.5 - 10.5 mg/dL    Correct Calcium 9.0 8.5 - 10.5 mg/dL    AST(SGOT) 20 12 - 45 U/L    ALT(SGPT) 11 2 - 50 U/L    Alkaline Phosphatase 74 30 - 99 U/L    Total Bilirubin 0.3 0.1 - 1.5 mg/dL    Albumin 3.7 3.2 - 4.9 g/dL    Total Protein 5.8 (L) 6.0 - 8.2 g/dL    Globulin 2.1 1.9 - 3.5 g/dL    A-G Ratio 1.8 g/dL   Magnesium    Collection Time: 06/06/25  2:09 AM   Result Value Ref Range    Magnesium 1.8 1.5 - 2.5 mg/dL   Lipid Profile    Collection Time: 06/06/25  2:09 AM   Result Value Ref Range    Cholesterol,Tot 105 100 - 199 mg/dL    Triglycerides 119 0 - 149 mg/dL    HDL 45 >=40 mg/dL    LDL 36 <100 mg/dL   TROPONIN    Collection Time: 06/06/25  2:09 AM   Result Value Ref Range    Troponin T 30 (H) 6 - 19 ng/L   ESTIMATED GFR    Collection Time: 06/06/25  2:09 AM   Result Value Ref Range    GFR (CKD-EPI) 18 (A) >60  mL/min/1.73 m 2   EKG    Collection Time: 25  1:23 PM   Result Value Ref Range    Report       [facility] Emergency Dept.    Test Date:  2025  Pt Name:    JACK SIMPSON                 Department: [facility]  MRN:        [id number]                      Room:       1119  Gender:     Male                         Technician: RICARDA  :        1940                   Requested By:FELIPE SINGH  Order #:    [id number]                    Reading MD: Felipe Singh MD    Measurements  Intervals                                Axis  Rate:       53                           P:          49  DE:         214                          QRS:        -42  QRSD:       101                          T:          31  QT:         465  QTc:        437    Interpretive Statements  Sinus rhythm  Borderline prolonged DE interval  Left anterior fascicular block  Compared to ECG 2025 09:14:14  Left anterior fascicular block now present  Supraventricular tachycardia no longer present  Ventricular premature complex(es) no longer present  Aberrant conduction of supraventricular beat(s)  no longer present  Intraventricular conduction delay no longer present  Electronically Signed On 2025 13:23:05 PDT by Felipe Singh MD       I have independently interpreted this EKG    RADIOLOGY/PROCEDURES   I have independently interpreted the diagnostic imaging associated with this visit and am waiting the final reading from the radiologist.   My preliminary interpretation is as follows: Left lower lobe opacities.    Radiologist interpretation:  NM-CARDIAC STRESS TEST   Final Result      DX-CHEST-PORTABLE (1 VIEW)   Final Result      Patchy left basilar opacities, concerning for infection.        COURSE & MEDICAL DECISION MAKING    ASSESSMENT, COURSE AND PLAN  Care Narrative: This is an 84 year old male with a history of atrial fibrillation, on eliquis, who is here with a fluttering in his chest as well as shortness of  breath.    DDx includes, but is not limited to, arhythmia, PE, ACS, pneumonia, viral syndrome, pulmonary hypertension, fluid overload, anemia.    Arrives AF with normal VS. Appears dehydrated with dry mucous membranes but non-toxic. He is alert, interactive, neurologically intact, warm and well perfused. No leg swelling. He was off Eliquis for a few days prior to his surgery but restarted 5 days ago. No leg swelling, chest pain, hemoptysis. Unlikely PE. He has mildly increased work of breathing and appears breathless while speaking.    Started on IV fluids due to clinical evidence of dehydration.    EKG shows sinus rhythm. No signs of acute ischemia.    CXR suggests possible pneumonia but unclear if this is the etiology of his symptoms. Could be atelectasis from recent surgery. Given a dose of antibiotics.    CBC without leukocytosis or left shift. He does have an anemia with hemoglobin at 10.4 which actually appears to be improved from his baseline. No evidence of active bleeding. Unlikely that this is the etiology of his symptoms. Metabolic panel with hyperglycemia to 151. Renal function is low but consistent with prior values. Potassium and bicarb are normal. No indication for urgent dialysis. Troponin is elevated to 35. He has chronically elevated troponin but today's value is slightly more elevated than baseline. His CKD is likely impacting this as well. BNP elevated to 997. TSH normal. Procalcitonin negative. Viral swabs are negative.    I had an extensive discussion with the patient at bedside. We discussed lab/imaging results. His HEART score is 6. I feel it is beneficial for him to undergo provocative testing and he/his wife concur. Discussed with the hospitalist and admitted in guarded condition.    CHEST PAIN:   HEART Score for Major Cardiac Events  HEART Score     History: Slightly suspicious  ECG: Non-specific repolarization disturbance  Age: 65+  Risk Factors: 1-2 risk factors  Troponin: 1-3 times  normal limit    Heart Score: 5    Total Score   0-3 Points = Low Score, risk of MACE 0.9-1.7%.  4-6 Points = Moderate Score, risk of MACE 12-16.6%  7-10 Points = High Score, risk of MACE 50-65%    Hydration: Based on the patient's presentation of Dehydration the patient was given IV fluids. IV Hydration was used because oral hydration was not adequate alone. Upon recheck following hydration, the patient was improved.    ADDITIONAL PROBLEMS MANAGED  None    DISPOSITION AND DISCUSSIONS  I have discussed management of the patient with the following physicians and ALBERT's:  Dr. Younger, hospitalist.    Discussion of management with other [facility] or appropriate source(s): None     Escalation of care considered, and ultimately not performed: N/A    Barriers to care at this time, including but not limited to: None.     Decision tools and prescription drugs considered including, but not limited to: HEART score.    FINAL DIAGNOSIS  Dyspnea on exertion  Elevated troponin     Electronically signed by: Seng Reilly M.D., 6/5/2025 9:57 AM           [1] No Known Allergies

## 2025-06-05 NOTE — PROGRESS NOTES
Pharmacy Medication Reconciliation      ~Medication reconciliation updated and complete per patient at bedside with medication list. Reviewed list with patient and returned   ~Allergies have been verified  ~No oral ABX within the last 30 days  ~Is dispense history available in EPIC: yes   ~Patient home pharmacy :  [facility]       ~Anticoagulants (rivaroxaban, apixaban, edoxaban, dabigatran, warfarin, enoxaparin) taken in the last 14 days? Yes   ~Anticoagulant: Eliquis, Last dose: 6/5/2025

## 2025-06-05 NOTE — PROGRESS NOTES
4 Eyes Skin Assessment Completed by VIJAY Butt and Scotty RN.    Skin assessment is primarily focused on high risk bony prominences. Pay special attention to skin beneath and around medical devices, high risk bony prominences, skin to skin areas and areas where the patient lacks sensation to feel pain and areas where the patient previously had breakdown.     Head (Occipital):  WDL   Ears (Under Medical Devices): WDL   Nose (Under Medical Devices): WDL   Mouth:  WDL   Neck: WDL   Breast/Chest:  WDL   Shoulder Blades:  WDL   Spine:   WDL   (R) Arm/Elbow/Hand: WDL   (L) Arm/Elbow/Hand: WDL   Abdomen: Scar healed incision site    Pannus/Groin:  WDL   Sacrum/Coccyx:   WDL   (R) Ischial Tuberosity (Sit Bones):  WDL   (L) Ischial Tuberosity (Sit Bones):  WDL   (R) Leg:  WDL   (L) Leg:  WDL   (R) Heel:  WDL   (R) Foot/Toe: WDL   (L) Heel: WDL   (L) Foot/Toe:  WDL       DEVICES IN USE:   Respiratory Devices:  NA, patient on room air  Feeding Devices:  N/A   Lines & BP Monitoring Devices:  Peripheral IV    Orthopedic Devices:  N/A  Miscellaneous Devices:  N/A    PROTOCOL INTERVENTIONS:   Standard/Trauma Bed:  Already in place    WOUND PHOTOS:   N/A no wounds identified    WOUND CONSULT:   N/A, no advanced wound care needs identified

## 2025-06-06 ENCOUNTER — APPOINTMENT (OUTPATIENT)
Dept: RADIOLOGY | Facility: MEDICAL CENTER | Age: 85
End: 2025-06-06
Attending: HOSPITALIST
Payer: MEDICARE

## 2025-06-06 VITALS
BODY MASS INDEX: 21.79 KG/M2 | WEIGHT: 155.65 LBS | TEMPERATURE: 97.1 F | HEART RATE: 73 BPM | DIASTOLIC BLOOD PRESSURE: 67 MMHG | RESPIRATION RATE: 19 BRPM | SYSTOLIC BLOOD PRESSURE: 135 MMHG | HEIGHT: 71 IN | OXYGEN SATURATION: 93 %

## 2025-06-06 PROBLEM — R06.02 EXERTIONAL SHORTNESS OF BREATH: Status: RESOLVED | Noted: 2025-06-05 | Resolved: 2025-06-06

## 2025-06-06 LAB
ALBUMIN SERPL BCP-MCNC: 3.7 G/DL (ref 3.2–4.9)
ALBUMIN/GLOB SERPL: 1.8 G/DL
ALP SERPL-CCNC: 74 U/L (ref 30–99)
ALT SERPL-CCNC: 11 U/L (ref 2–50)
ANION GAP SERPL CALC-SCNC: 15 MMOL/L (ref 7–16)
AST SERPL-CCNC: 20 U/L (ref 12–45)
B PARAP IS1001 DNA NPH QL NAA+NON-PROBE: NOT DETECTED
B PERT.PT PRMT NPH QL NAA+NON-PROBE: NOT DETECTED
BILIRUB SERPL-MCNC: 0.3 MG/DL (ref 0.1–1.5)
BUN SERPL-MCNC: 54 MG/DL (ref 8–22)
C PNEUM DNA NPH QL NAA+NON-PROBE: NOT DETECTED
CALCIUM ALBUM COR SERPL-MCNC: 9 MG/DL (ref 8.5–10.5)
CALCIUM SERPL-MCNC: 8.8 MG/DL (ref 8.5–10.5)
CHLORIDE SERPL-SCNC: 110 MMOL/L (ref 96–112)
CHOLEST SERPL-MCNC: 105 MG/DL (ref 100–199)
CO2 SERPL-SCNC: 19 MMOL/L (ref 20–33)
CREAT SERPL-MCNC: 3.31 MG/DL (ref 0.5–1.4)
EKG IMPRESSION: NORMAL
ERYTHROCYTE [DISTWIDTH] IN BLOOD BY AUTOMATED COUNT: 49.8 FL (ref 35.9–50)
FLUAV RNA NPH QL NAA+NON-PROBE: NOT DETECTED
FLUBV RNA NPH QL NAA+NON-PROBE: NOT DETECTED
GFR SERPLBLD CREATININE-BSD FMLA CKD-EPI: 18 ML/MIN/1.73 M 2
GLOBULIN SER CALC-MCNC: 2.1 G/DL (ref 1.9–3.5)
GLUCOSE SERPL-MCNC: 103 MG/DL (ref 65–99)
HADV DNA NPH QL NAA+NON-PROBE: NOT DETECTED
HCOV 229E RNA NPH QL NAA+NON-PROBE: NOT DETECTED
HCOV HKU1 RNA NPH QL NAA+NON-PROBE: NOT DETECTED
HCOV NL63 RNA NPH QL NAA+NON-PROBE: NOT DETECTED
HCOV OC43 RNA NPH QL NAA+NON-PROBE: NOT DETECTED
HCT VFR BLD AUTO: 28.8 % (ref 42–52)
HDLC SERPL-MCNC: 45 MG/DL
HGB BLD-MCNC: 9.2 G/DL (ref 14–18)
HMPV RNA NPH QL NAA+NON-PROBE: NOT DETECTED
HPIV1 RNA NPH QL NAA+NON-PROBE: NOT DETECTED
HPIV2 RNA NPH QL NAA+NON-PROBE: NOT DETECTED
HPIV3 RNA NPH QL NAA+NON-PROBE: NOT DETECTED
HPIV4 RNA NPH QL NAA+NON-PROBE: NOT DETECTED
LDLC SERPL CALC-MCNC: 36 MG/DL
M PNEUMO DNA NPH QL NAA+NON-PROBE: NOT DETECTED
MAGNESIUM SERPL-MCNC: 1.8 MG/DL (ref 1.5–2.5)
MCH RBC QN AUTO: 33.6 PG (ref 27–33)
MCHC RBC AUTO-ENTMCNC: 31.9 G/DL (ref 32.3–36.5)
MCV RBC AUTO: 105.1 FL (ref 81.4–97.8)
PLATELET # BLD AUTO: 206 K/UL (ref 164–446)
PMV BLD AUTO: 9.6 FL (ref 9–12.9)
POTASSIUM SERPL-SCNC: 5.2 MMOL/L (ref 3.6–5.5)
PROT SERPL-MCNC: 5.8 G/DL (ref 6–8.2)
RBC # BLD AUTO: 2.74 M/UL (ref 4.7–6.1)
RSV RNA NPH QL NAA+NON-PROBE: NOT DETECTED
RV+EV RNA NPH QL NAA+NON-PROBE: NOT DETECTED
SARS-COV-2 RNA NPH QL NAA+NON-PROBE: NOTDETECTED
SODIUM SERPL-SCNC: 144 MMOL/L (ref 135–145)
TRIGL SERPL-MCNC: 119 MG/DL (ref 0–149)
TROPONIN T SERPL-MCNC: 30 NG/L (ref 6–19)
WBC # BLD AUTO: 7.6 K/UL (ref 4.8–10.8)

## 2025-06-06 PROCEDURE — 700102 HCHG RX REV CODE 250 W/ 637 OVERRIDE(OP): Performed by: HOSPITALIST

## 2025-06-06 PROCEDURE — 80053 COMPREHEN METABOLIC PANEL: CPT

## 2025-06-06 PROCEDURE — G0378 HOSPITAL OBSERVATION PER HR: HCPCS

## 2025-06-06 PROCEDURE — 99239 HOSP IP/OBS DSCHRG MGMT >30: CPT | Performed by: HOSPITALIST

## 2025-06-06 PROCEDURE — 93018 CV STRESS TEST I&R ONLY: CPT | Performed by: INTERNAL MEDICINE

## 2025-06-06 PROCEDURE — 94760 N-INVAS EAR/PLS OXIMETRY 1: CPT

## 2025-06-06 PROCEDURE — 84484 ASSAY OF TROPONIN QUANT: CPT

## 2025-06-06 PROCEDURE — 78452 HT MUSCLE IMAGE SPECT MULT: CPT | Mod: 26 | Performed by: INTERNAL MEDICINE

## 2025-06-06 PROCEDURE — A9270 NON-COVERED ITEM OR SERVICE: HCPCS | Performed by: HOSPITALIST

## 2025-06-06 PROCEDURE — A9502 TC99M TETROFOSMIN: HCPCS

## 2025-06-06 PROCEDURE — 85027 COMPLETE CBC AUTOMATED: CPT

## 2025-06-06 PROCEDURE — 700111 HCHG RX REV CODE 636 W/ 250 OVERRIDE (IP)

## 2025-06-06 PROCEDURE — 83735 ASSAY OF MAGNESIUM: CPT

## 2025-06-06 PROCEDURE — 80061 LIPID PANEL: CPT

## 2025-06-06 PROCEDURE — 36415 COLL VENOUS BLD VENIPUNCTURE: CPT

## 2025-06-06 RX ORDER — REGADENOSON 0.08 MG/ML
INJECTION, SOLUTION INTRAVENOUS
Status: COMPLETED
Start: 2025-06-06 | End: 2025-06-06

## 2025-06-06 RX ADMIN — AMLODIPINE BESYLATE 2.5 MG: 5 TABLET ORAL at 06:11

## 2025-06-06 RX ADMIN — FAMOTIDINE 20 MG: 20 TABLET, FILM COATED ORAL at 06:11

## 2025-06-06 RX ADMIN — REGADENOSON 0.4 MG: 0.08 INJECTION, SOLUTION INTRAVENOUS at 09:50

## 2025-06-06 RX ADMIN — LOSARTAN POTASSIUM 25 MG: 25 TABLET, FILM COATED ORAL at 06:11

## 2025-06-06 RX ADMIN — DAPAGLIFLOZIN 10 MG: 10 TABLET, FILM COATED ORAL at 06:12

## 2025-06-06 RX ADMIN — ASPIRIN 81 MG: 81 TABLET, COATED ORAL at 06:10

## 2025-06-06 RX ADMIN — SIMVASTATIN 40 MG: 20 TABLET, FILM COATED ORAL at 06:12

## 2025-06-06 ASSESSMENT — HEART SCORE
AGE: 65+
RISK FACTORS: 1-2 RISK FACTORS
HEART SCORE: 5
TROPONIN: 1-3 TIMES NORMAL LIMIT
ECG: NON-SPECIFIC REPOLARIZATION DISTURBANCE
HISTORY: SLIGHTLY SUSPICIOUS

## 2025-06-06 ASSESSMENT — FIBROSIS 4 INDEX: FIB4 SCORE: 2.46

## 2025-06-06 NOTE — PROGRESS NOTES
Rhythm: SB  Rate: 56  Measurements: 0.22/0.08/0.44  Ectopy (reported by Monitor Tech): -     Normal Values  Rhythm: Sinus  HR:   Measurements: 0.12-0.20/0.06-0.10/0.30-0.52

## 2025-06-06 NOTE — PROGRESS NOTES
Telemetry shift summary    Rhythm: SR/SB  HR: 56-77  Ectopy: R-PAC, PVC, BIG    Measurements: .18 / .10 / .42    Normal values  Rhythm SR  HR   Measurements: 0.12-0.20/0.08-0.10/0.30-0.52

## 2025-06-06 NOTE — ASSESSMENT & PLAN NOTE
In the indeterminate range  Denies chest pain.  EKG shows sinus bradycardia with a rate of 53.  There is no ST elevation.  There is left anterior fascicular branch block.  There are T wave inversions in leads III, leads V1-V2.  QTc is 437.    I will place on continuous cardiac monitoring    I will trend troponin levels  Will check a stress test given his exertional shortness of breath

## 2025-06-06 NOTE — CARE PLAN
Problem: Fall Risk  Goal: Patient will remain free from falls  Description: Target End Date:  Prior to discharge or change in level of careDocument interventions on the [facility] Fall Risk Assessment1.  Assess for fall risk factors2.  Implement fall precautions  Outcome: Progressing   The patient is Stable - Low risk of patient condition declining or worsening    Shift Goals  Clinical Goals: stress test  Patient Goals: rest  Family Goals: tawanda    Progress made toward(s) clinical / shift goals:  education provided regarding fall safety including use of call bell for assistance, bed kept in lowest position. Patient ambulates well without assistance needed    Patient is not progressing towards the following goals:

## 2025-06-06 NOTE — DISCHARGE SUMMARY
Discharge Summary    CHIEF COMPLAINT ON ADMISSION  Chief Complaint   Patient presents with    Palpitations     Strong of jeronimo has been feeling fluttering in chest  about 10-15 seconds  worse last night during dinner    today having more feelings of fluttering     recent abdomen surgery  hernia repair this past Friday  is healing well from this           Reason for Admission  Shortness of Breath     Admission Date  6/5/2025    CODE STATUS  Full Code    HPI & HOSPITAL COURSE  This is a 84 y.o. male here with shortness of breath and fluttering in the chest     Sonny Cross has past medical history that includes aortic stenosis, chronic kidney disease and atrial fibrillation.  He underwent a hernia repair surgery last Friday.  Patient presented to the emergency room 6/5/2025 with palpitations and a sensation of shortness of breath.  Patient was hospitalized and stress test was ordered.  Patient has completed stress test, there is a very small perfusion deficit which is likely nonischemic, no large area of reversible ischemia.  His symptoms have completely resolved.    I the patient is pretty sure that the symptoms were related to atrial fibrillation.  This has happened to him in the past.      We discussed options to move forward.  The patient is well-established with Dr. Beaulieu of cardiology.  He is going to call his office on Monday to discuss stress test results.  I do not believe that the patient's symptoms are ischemic.  We did discuss that the patient would need to have his aortic stenosis followed.  Patient states that he had an echocardiogram done a couple months ago with Dr. Beaulieu's office.  We also discussed that long-term cardiac monitoring may be useful to determine if his symptoms are related to atrial fibrillation.    Therefore, he is discharged in good and stable condition to home with organized home healthcare and close outpatient follow-up.    Discharge Date  6/6/2025    FOLLOW UP ITEMS POST  DISCHARGE  Follow-up with Dr. 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  No follow-up provider specified.    MEDICATIONS ON DISCHARGE     Medication List        CONTINUE taking these medications        Instructions   amLODIPine 5 MG Tabs  Commonly known as: Norvasc   Take 5 mg by mouth every day.  Dose: 5 mg     CALCIUM 1200 PO   Take 1,200 mg by mouth every day.  Dose: 1,200 mg     famotidine 20 MG Tabs  Commonly known as: Pepcid   Take 20 mg by mouth 2 times a day.  Dose: 20 mg     Farxiga 10 MG Tabs  Generic drug: dapagliflozin propanediol   Take 10 mg by mouth every day.  Dose: 10 mg     losartan 25 MG Tabs  Commonly known as: Cozaar   Take 25 mg by mouth every day.  Dose: 25 mg     MULTI-VITAMIN DAILY PO   Take 1 Tablet by mouth every day.  Dose: 1 Tablet     oxyCODONE-acetaminophen 5-325 MG Tabs  Commonly known as: Percocet   Take 1 Tablet by mouth every four hours as needed for Severe Pain.  Dose: 1 Tablet     simvastatin 40 MG Tabs  Commonly known as: Zocor   Take 40 mg by mouth every morning.  Dose: 40 mg     Stelara 90 MG/ML Sosy injection  Generic drug: ustekinumab   Inject 90 mg under the skin Every 60 days. Indications: Crohn's Disease  Dose: 90 mg     Tylenol 8 Hour Arthritis Pain 650 MG CR tablet  Generic drug: acetaminophen   Take 650-1,300 mg by mouth as needed for Mild Pain. Indications: Pain  Dose: 650-1,300 mg     VITAMIN B-12 PO   Take 2,500 mcg by mouth every day.  Dose: 2,500 mcg     VITAMIN D PO   Take 1,000 Units by mouth every day.  Dose: 1,000 Units              Allergies  Allergies[1]    DIET  Orders Placed This  Encounter   Procedures    Diet Order Diet: Regular     Standing Status:   Standing     Number of Occurrences:   1     Diet::   Regular [1]       ACTIVITY  As tolerated.  Weight bearing as tolerated    CONSULTATIONS  None    PROCEDURES  Nuclear medicine stress test 6/6/2025:   NUCLEAR IMAGING INTERPRETATION   Normal left ventricular size, ejection fraction, and wall motion.   Very small apical perfusion defect, likely artifact than true ischemia.    Correlate clinically.   ECG INTERPRETATION   Non-diagnostic       LABORATORY  Lab Results   Component Value Date    SODIUM 144 06/06/2025    POTASSIUM 5.2 06/06/2025    CHLORIDE 110 06/06/2025    CO2 19 (L) 06/06/2025    GLUCOSE 103 (H) 06/06/2025    BUN 54 (H) 06/06/2025    CREATININE 3.31 (H) 06/06/2025        Lab Results   Component Value Date    WBC 7.6 06/06/2025    HEMOGLOBIN 9.2 (L) 06/06/2025    HEMATOCRIT 28.8 (L) 06/06/2025    PLATELETCT 206 06/06/2025        Total time of the discharge process exceeds 38 minutes.       [1] No Known Allergies

## 2025-06-06 NOTE — ASSESSMENT & PLAN NOTE
I will check a D-dimer.  D-dimer is 0.56 which is negative for just for his age.  I will check procalcitonin level  I will check a PCR respiratory panel  I will check a stress test to rule out angina

## 2025-06-06 NOTE — HOSPITAL COURSE
Sonny Cross has past ministry chronic disease atrial fibrillation.  He underwent a hernia repair surgery last Friday.  Patient presented to the emergency room 6/5/2025 with palpitations and exertional shortness of breath.  Patient was hospitalized and stress test was ordered.

## 2025-06-06 NOTE — ASSESSMENT & PLAN NOTE
I had a discussion with the patient regarding goals of care, diagnoses, prognosis, and CODE STATUS. We discussed his prognosis and comorbidities.  The patient has advanced age of 84 years.  He has a number of chronic medical problems including chronic kidney disease, hypertension and hyperlipidemia.  He is presenting with exertional shortness of breath.  However, the patient enjoys a very good cognitive and functional capacity.  I explained CPR, intubation and mechanical ventilation.  Currently the patient wants to maintain a full code.  He is open to all forms of invasive or noninvasive diagnostic and therapeutic interventions including stress test coronary angiography and coronary artery bypass if needed.

## 2025-06-06 NOTE — PROGRESS NOTES
Bedside report received from brendan RN, Art. Patient awake and alert in bed AOx4. Pt is on room air. Pt had no complaints of pain at this time. Water pitcher refilled. Bed is locked and in low position. Reviewed plan of care with patient. Call light within reach. No other needs at this time.   Walk in Private Auto

## 2025-06-06 NOTE — CARE PLAN
The patient is Stable - Low risk of patient condition declining or worsening    Shift Goals  Clinical Goals: NPO at midnight, Troponin, Monitor VS  Patient Goals: Sleep and feel better  Family Goals: RONNA    Progress made toward(s) clinical / shift goals: Pt was AO x4. Pt had no complaints of pain. Pt troponin was assessed. VSS. Pt able to void in urinal. Pt has call light within reach, bed in lowest position, and pt is NPO at midnight.      Problem: Fall Risk  Goal: Patient will remain free from falls  Outcome: Progressing     Problem: Knowledge Deficit - Standard  Goal: Patient and family/care givers will demonstrate understanding of plan of care, disease process/condition, diagnostic tests and medications  Outcome: Progressing       Patient is not progressing towards the following goals:

## 2025-06-09 ENCOUNTER — PATIENT OUTREACH (OUTPATIENT)
Dept: MEDICAL GROUP | Facility: MEDICAL CENTER | Age: 85
End: 2025-06-09
Payer: MEDICARE

## 2025-06-09 NOTE — PROGRESS NOTES
Transitional Care Management  TCM Outreach Date and Time: Filed (6/9/2025 12:26 PM)    Discharge Questions  Actual Discharge Date: 06/06/25  Now that you are home, how are you feeling?: Good  Did you receive any new prescriptions?: No  Do you have any questions about your current medications or new medications (Review Med Rec)?: No  Did you have any durable medical equipment ordered?: No  Do you have a follow up appointment scheduled with your PCP?: No  Was an appointment scheduled for the patient?: No  Any issues or paperwork you wish to discuss with your PCP?: Yes (Please specify) (Hospital Stay)  Reason not scheduled?: Patient to Schedule  If Home Health was ordered, have they contacted you (Patient): Not Applicable  Did you have enough support after your last discharge?: Yes  Does this patient qualify for the CCM program?: No    Transitional Care  Number of attempts made to contact patient: 1  Current or previous attempts completed within two business days of discharge? : Yes  Provided education regarding treatment plan, medications, self-management, ADLs?: Yes (ED precautions reviewed)  Has patient completed an Advanced Directive?: Yes  Is the patient's advanced directive on file?: Yes  Is there anything else I can help you with?: No    Discharge Summary  Chief Complaint: shortness of breath and fluttering in the chest  Admitting Diagnosis: shortness of breath and fluttering in the chest  Discharge Diagnosis: Exertional shortness of breath      Pt denies other concerns or questions at this time.    Alivia Ledezma R.N.

## 2025-06-10 LAB
BACTERIA BLD CULT: NORMAL
BACTERIA BLD CULT: NORMAL
SIGNIFICANT IND 70042: NORMAL
SIGNIFICANT IND 70042: NORMAL
SITE SITE: NORMAL
SITE SITE: NORMAL
SOURCE SOURCE: NORMAL
SOURCE SOURCE: NORMAL

## 2025-08-05 ENCOUNTER — HOSPITAL ENCOUNTER (OUTPATIENT)
Dept: LAB | Facility: MEDICAL CENTER | Age: 85
End: 2025-08-05
Attending: INTERNAL MEDICINE
Payer: MEDICARE

## 2025-08-05 LAB
25(OH)D3 SERPL-MCNC: 75 NG/ML (ref 30–100)
ALBUMIN SERPL BCP-MCNC: 4.3 G/DL (ref 3.2–4.9)
ANION GAP SERPL CALC-SCNC: 14 MMOL/L (ref 7–16)
APPEARANCE UR: CLEAR
BACTERIA #/AREA URNS HPF: NORMAL /HPF
BASOPHILS # BLD AUTO: 0.4 % (ref 0–1.8)
BASOPHILS # BLD: 0.02 K/UL (ref 0–0.12)
BILIRUB UR QL STRIP.AUTO: NEGATIVE
BUN SERPL-MCNC: 63 MG/DL (ref 8–22)
CALCIUM ALBUM COR SERPL-MCNC: 9.4 MG/DL (ref 8.5–10.5)
CALCIUM SERPL-MCNC: 9.6 MG/DL (ref 8.5–10.5)
CASTS URNS QL MICRO: NORMAL /LPF (ref 0–2)
CHLORIDE SERPL-SCNC: 107 MMOL/L (ref 96–112)
CO2 SERPL-SCNC: 18 MMOL/L (ref 20–33)
COLOR UR: YELLOW
CREAT SERPL-MCNC: 3.82 MG/DL (ref 0.5–1.4)
CREAT UR-MCNC: 56.4 MG/DL
CREAT UR-MCNC: 56.9 MG/DL
EOSINOPHIL # BLD AUTO: 0.05 K/UL (ref 0–0.51)
EOSINOPHIL NFR BLD: 0.9 % (ref 0–6.9)
EPITHELIAL CELLS 1715: NORMAL /HPF (ref 0–5)
ERYTHROCYTE [DISTWIDTH] IN BLOOD BY AUTOMATED COUNT: 50.3 FL (ref 35.9–50)
GFR SERPLBLD CREATININE-BSD FMLA CKD-EPI: 15 ML/MIN/1.73 M 2
GLUCOSE SERPL-MCNC: 113 MG/DL (ref 65–99)
GLUCOSE UR STRIP.AUTO-MCNC: 250 MG/DL
HCT VFR BLD AUTO: 32.7 % (ref 42–52)
HGB BLD-MCNC: 10.3 G/DL (ref 14–18)
IMM GRANULOCYTES # BLD AUTO: 0.04 K/UL (ref 0–0.11)
IMM GRANULOCYTES NFR BLD AUTO: 0.7 % (ref 0–0.9)
KETONES UR STRIP.AUTO-MCNC: NEGATIVE MG/DL
LEUKOCYTE ESTERASE UR QL STRIP.AUTO: NEGATIVE
LYMPHOCYTES # BLD AUTO: 1.18 K/UL (ref 1–4.8)
LYMPHOCYTES NFR BLD: 20.8 % (ref 22–41)
MCH RBC QN AUTO: 32.1 PG (ref 27–33)
MCHC RBC AUTO-ENTMCNC: 31.5 G/DL (ref 32.3–36.5)
MCV RBC AUTO: 101.9 FL (ref 81.4–97.8)
MICRO URNS: ABNORMAL
MICROALBUMIN UR-MCNC: 14.5 MG/DL
MICROALBUMIN/CREAT UR: 257 MG/G (ref 0–30)
MONOCYTES # BLD AUTO: 0.58 K/UL (ref 0–0.85)
MONOCYTES NFR BLD AUTO: 10.2 % (ref 0–13.4)
NEUTROPHILS # BLD AUTO: 3.79 K/UL (ref 1.82–7.42)
NEUTROPHILS NFR BLD: 67 % (ref 44–72)
NITRITE UR QL STRIP.AUTO: NEGATIVE
NRBC # BLD AUTO: 0 K/UL
NRBC BLD-RTO: 0 /100 WBC (ref 0–0.2)
PH UR STRIP.AUTO: 7.5 [PH] (ref 5–8)
PHOSPHATE SERPL-MCNC: 3.7 MG/DL (ref 2.5–4.5)
PLATELET # BLD AUTO: 247 K/UL (ref 164–446)
PMV BLD AUTO: 9.9 FL (ref 9–12.9)
POTASSIUM SERPL-SCNC: 4.9 MMOL/L (ref 3.6–5.5)
PROT UR QL STRIP: 30 MG/DL
PTH-INTACT SERPL-MCNC: 73 PG/ML (ref 14–72)
RBC # BLD AUTO: 3.21 M/UL (ref 4.7–6.1)
RBC # URNS HPF: NORMAL /HPF (ref 0–2)
RBC UR QL AUTO: NEGATIVE
SODIUM SERPL-SCNC: 139 MMOL/L (ref 135–145)
SP GR UR STRIP.AUTO: 1.01
UROBILINOGEN UR STRIP.AUTO-MCNC: 0.2 EU/DL
WBC # BLD AUTO: 5.7 K/UL (ref 4.8–10.8)
WBC #/AREA URNS HPF: NORMAL /HPF

## 2025-08-05 PROCEDURE — 81001 URINALYSIS AUTO W/SCOPE: CPT

## 2025-08-05 PROCEDURE — 82306 VITAMIN D 25 HYDROXY: CPT

## 2025-08-05 PROCEDURE — 83970 ASSAY OF PARATHORMONE: CPT

## 2025-08-05 PROCEDURE — 36415 COLL VENOUS BLD VENIPUNCTURE: CPT

## 2025-08-05 PROCEDURE — 82043 UR ALBUMIN QUANTITATIVE: CPT

## 2025-08-05 PROCEDURE — 82570 ASSAY OF URINE CREATININE: CPT | Mod: 91

## 2025-08-05 PROCEDURE — 85025 COMPLETE CBC W/AUTO DIFF WBC: CPT

## 2025-08-05 PROCEDURE — 80069 RENAL FUNCTION PANEL: CPT

## (undated) DEVICE — SODIUM CHL. INJ. 0.9% 500ML (24EA/CA 50CA/PF)

## (undated) DEVICE — DERMABOND ADVANCED - (12EA/BX)

## (undated) DEVICE — GLOVE BIOGEL INDICATOR SZ 7SURGICAL PF LTX - (50/BX 4BX/CA)

## (undated) DEVICE — TOWEL STOP TIMEOUT SAFETY FLAG (40EA/CA)

## (undated) DEVICE — TUBE CONNECTING SUCTION - CLEAR PLASTIC STERILE 72 IN (50EA/CA)

## (undated) DEVICE — SODIUM CHL IRRIGATION 0.9% 1000ML (12EA/CA)

## (undated) DEVICE — SEAL UNIVERSAL 5MM-12MM (10EA/BX)

## (undated) DEVICE — CANNULA W/ SUPPLY TUBING O2 - (50/CA)

## (undated) DEVICE — DRAPE COLUMN BOX OF 20

## (undated) DEVICE — OBTURATOR BLADELESS STANDARD 8MM (6EA/BX)

## (undated) DEVICE — SUTURE GENERAL

## (undated) DEVICE — SLEEVE VASO DVT COMPRESSION CALF MED - (10PR/CA)

## (undated) DEVICE — SET LEADWIRE 5 LEAD BEDSIDE DISPOSABLE ECG (1SET OF 5/EA)

## (undated) DEVICE — ELECTRODE 850 FOAM ADHESIVE - HYDROGEL RADIOTRNSPRNT (50/PK)

## (undated) DEVICE — SOD. CHL 10CC SYRINGE PREFILL - W/10 CC (30/BX)

## (undated) DEVICE — SYRINGE 3 CC 22 GA X 1-1/2 - NDL SAFETY (50/BX 8BX/CA)

## (undated) DEVICE — CON SEDATION EA ADDL 15 MIN

## (undated) DEVICE — DRAPE ABDOMINAL STERILE LAPAROSCOPIC 102IN X 121IN 77IN (12EA/CA)

## (undated) DEVICE — BANDAID SHEER STRIP 3/4 IN (100EA/BX 12BX/CA)

## (undated) DEVICE — SYRINGE 6 CC 20 GA X 1 1/2 - NDL SAFETY  (50/BX)

## (undated) DEVICE — SUTURE 2-0 20CM STRATAFIX SPIRAL SH NEEDLE (12/BX)

## (undated) DEVICE — DEVICE CLOSURE ABSORBABLE BLUE SYNETURE V-LOC 1 GS-22 L12 IN (12EA/CT)

## (undated) DEVICE — DRAPE ARM BOX OF 20

## (undated) DEVICE — COVER TIP ENDOWRIST HOT SHEAR - (10EA/BX) DA VINCI

## (undated) DEVICE — TUBING CLEARLINK DUO-VENT - C-FLO (48EA/CA)

## (undated) DEVICE — CON SEDATION/>5 YR 1ST 15 MIN

## (undated) DEVICE — SUTURE 4-0 MONOCRYL PLUS PS-2 - 27 INCH (36/BX)

## (undated) DEVICE — SYRINGE 30 ML LS (56/BX)

## (undated) DEVICE — PACK DAVINCI GENERAL (3EA/CA)

## (undated) DEVICE — SYSTEM CLEARIFY VISUALIZATION (10EA/PK)